# Patient Record
Sex: MALE | Race: BLACK OR AFRICAN AMERICAN | NOT HISPANIC OR LATINO | URBAN - METROPOLITAN AREA
[De-identification: names, ages, dates, MRNs, and addresses within clinical notes are randomized per-mention and may not be internally consistent; named-entity substitution may affect disease eponyms.]

---

## 2024-04-10 ENCOUNTER — TELEPHONE (OUTPATIENT)
Dept: ORTHOPEDICS | Facility: CLINIC | Age: 49
End: 2024-04-10
Payer: OTHER GOVERNMENT

## 2024-04-10 DIAGNOSIS — M79.642 BILATERAL HAND PAIN: Primary | ICD-10-CM

## 2024-04-10 DIAGNOSIS — M79.641 BILATERAL HAND PAIN: Primary | ICD-10-CM

## 2024-04-10 NOTE — TELEPHONE ENCOUNTER
I called the patient and left a voicemail reminding them of their appointment on 04/16/2024 with Dr. Lucas. I told them they will have XR done at 3:15 pm after their appointment. I informed them where to go and to arrive 15 minutes before their appointment time.

## 2024-04-10 NOTE — TELEPHONE ENCOUNTER
LVM c pt. Attempting to confirm appt location & time c Dr. Rosa   with XR. Requested a call back to the [facility] at [phone] with any questions, concerns or need for a different appointment time.

## 2024-04-16 ENCOUNTER — OFFICE VISIT (OUTPATIENT)
Dept: ORTHOPEDICS | Facility: CLINIC | Age: 49
End: 2024-04-16
Payer: OTHER GOVERNMENT

## 2024-04-16 ENCOUNTER — HOSPITAL ENCOUNTER (OUTPATIENT)
Dept: RADIOLOGY | Facility: OTHER | Age: 49
Discharge: HOME | End: 2024-04-16
Attending: ORTHOPAEDIC SURGERY
Payer: OTHER GOVERNMENT

## 2024-04-16 DIAGNOSIS — M79.641 BILATERAL HAND PAIN: Primary | ICD-10-CM

## 2024-04-16 DIAGNOSIS — M79.642 BILATERAL HAND PAIN: Primary | ICD-10-CM

## 2024-04-16 DIAGNOSIS — M79.641 BILATERAL HAND PAIN: ICD-10-CM

## 2024-04-16 DIAGNOSIS — M79.642 BILATERAL HAND PAIN: ICD-10-CM

## 2024-04-16 PROCEDURE — 99213 OFFICE O/P EST LOW 20 MIN: CPT | Mod: PBBFAC,25 | Performed by: ORTHOPAEDIC SURGERY

## 2024-04-16 PROCEDURE — 73130 X-RAY EXAM OF HAND: CPT | Mod: TC,50,FY

## 2024-04-16 PROCEDURE — 73130 X-RAY EXAM OF HAND: CPT | Mod: 26,50,, | Performed by: RADIOLOGY

## 2024-04-16 PROCEDURE — 99205 OFFICE O/P NEW HI 60 MIN: CPT | Mod: S$PBB,,, | Performed by: ORTHOPAEDIC SURGERY

## 2024-04-16 PROCEDURE — 99999 PR PBB SHADOW E&M-EST. PATIENT-LVL III: CPT | Mod: PBBFAC,,, | Performed by: ORTHOPAEDIC SURGERY

## 2024-04-19 NOTE — PROGRESS NOTES
I have personally taken the history and examined this patient. I agree with the resident's note as stated above.     Plan:  - Had an extensive discussion with the patient regarding diagnoses, reviewing images, and discussing conservative versus nonconservative options.  Overall I think the patient would benefit from surgical intervention to improve his function, but given the complexity of his deformities we would like to obtain more advanced imaging to further evaluate this.  He was in agreement with this plan.  Bilateral hand CTs ordered.  Follow up after CT to discuss imaging and possible surgical intervention.     - I spent more than 30 minutes reviewing this patient's medical records, imaging studies, and taking a full history and physical, and discussing his treatment plan and expected prognosis.  More than 50% of this time was spent face to face with the patient.  30 minutes of face to face consultation and 30 minutes of chart review and coordination of care.  This is a very complex case we spent a significant amount of time with the patient discussing options we do need to make sure that he is under great care from a rheumatologist since [facility] I do not have access to those records I would like access to his Rheumatology record

## 2024-04-22 ENCOUNTER — HOSPITAL ENCOUNTER (OUTPATIENT)
Dept: RADIOLOGY | Facility: HOSPITAL | Age: 49
Discharge: HOME | End: 2024-04-22
Attending: STUDENT IN AN ORGANIZED HEALTH CARE EDUCATION/TRAINING PROGRAM
Payer: OTHER GOVERNMENT

## 2024-04-22 DIAGNOSIS — M79.641 BILATERAL HAND PAIN: ICD-10-CM

## 2024-04-22 DIAGNOSIS — M79.642 BILATERAL HAND PAIN: ICD-10-CM

## 2024-04-22 PROCEDURE — 73200 CT UPPER EXTREMITY W/O DYE: CPT | Mod: TC,LT

## 2024-04-22 PROCEDURE — 73200 CT UPPER EXTREMITY W/O DYE: CPT | Mod: 26,LT,, | Performed by: RADIOLOGY

## 2024-04-22 PROCEDURE — 73200 CT UPPER EXTREMITY W/O DYE: CPT | Mod: 26,RT,, | Performed by: RADIOLOGY

## 2024-04-22 PROCEDURE — 73200 CT UPPER EXTREMITY W/O DYE: CPT | Mod: TC,RT

## 2024-04-30 ENCOUNTER — OFFICE VISIT (OUTPATIENT)
Dept: ORTHOPEDICS | Facility: CLINIC | Age: 49
End: 2024-04-30
Payer: OTHER GOVERNMENT

## 2024-04-30 VITALS — DIASTOLIC BLOOD PRESSURE: 78 MMHG | SYSTOLIC BLOOD PRESSURE: 111 MMHG

## 2024-04-30 DIAGNOSIS — M79.642 BILATERAL HAND PAIN: Primary | ICD-10-CM

## 2024-04-30 DIAGNOSIS — M79.641 BILATERAL HAND PAIN: Primary | ICD-10-CM

## 2024-04-30 PROCEDURE — 99214 OFFICE O/P EST MOD 30 MIN: CPT | Mod: S$PBB,,, | Performed by: ORTHOPAEDIC SURGERY

## 2024-04-30 PROCEDURE — 99999 PR PBB SHADOW E&M-EST. PATIENT-LVL II: CPT | Mod: PBBFAC,,, | Performed by: ORTHOPAEDIC SURGERY

## 2024-04-30 PROCEDURE — 99212 OFFICE O/P EST SF 10 MIN: CPT | Mod: PBBFAC | Performed by: ORTHOPAEDIC SURGERY

## 2024-04-30 NOTE — H&P
H&P  Orthopaedics    SUBJECTIVE:   Chief Complaint:  Bilateral hand contractures    History of Present Illness:  Patient is a 48 y.o. male who presents with 2 year history of worsening bilateral hand contractures.  The patient endorses he was diagnosed with polymyositis 3 years ago that is caused him debility with his hands with worsening wrist and digit hand contractures.  Endorses his left is worse than his right.  Denies pain or decreased sensation.  Does have the ability to do basic tasks such as grab things like a pizza and type with the assistance of a device.  Does have difficulty with hygiene due to his contractures and affects his ADLs.  Primarily wheelchair-bound and sometimes walks with his walker.  Also has chronic cough for which he [facility] a pulmon[facility]. He is on Cellcept for his polymyositis and endorses that his chronic cough has improved after starting a decreased dose, but his hand deformities have not.  Denies any lacking range of motion or strength of his bilateral shoulders or elbows.  Was seen by Dr. Perez initially for this issue and was previously scheduled for surgery, but due to approval, was not obtained.  Patient presents today for 2nd opinion.  Lives with his wife who is his primary caregiver.  Presents in wheelchair.     No hx of therapy, HEP, bracing, CSI, or Sx to [facility]. They are RHD.  [employer] .    Int hx 4/30/24: Patient returns today for B hand deformities. Last visit obtained CT scan for preoperative planning. Denies any changes to his symptoms. Ready to discuss surgical intervention for his right hand.    Scheduled Meds:      Continuous Infusions:      PRN Meds:        Review of patient's allergies indicates:  No Known Allergies    No past medical history on file.  No past surgical history on file.  No family history on file.        Review of Systems:  Patient denies constitutional symptoms, cardiac symptoms, respiratory symptoms, GI  symptoms.  The remainder of the musculoskeletal ROS is included in the HPI.      OBJECTIVE:     Physical Exam:  Gen:  No acute distress  CV:  Peripherally well-perfused.  Pulses 2+ bilaterally.  Lungs:  Normal respiratory effort.  Abdomen:  Soft, non-tender, non-distended  Head/Neck:  Normocephalic.  Atraumatic. No TTP, AROM and PROM intact without pain  Neuro:  CN intact without deficit, SILT throughout B/L Upper & Lower Extremities     Bilateral Hand/Wrist Examination:     No open wounds, abrasions, or surgical scars.  Nontender to palpation throughout.  Minimal range of motion of the wrist active or passively, but stuck in neutral position.  Ulnar drift of 2nd through 5th digits with subluxation of the extensor tendons.  MCPs contracted to 90° without contractures of the IP joints.  Left index finger PIPJ joint contracted at 90°.  He has no active extension to the 2nd through 5th digits bilaterally.    Right 3rd through 5th digit swan-neck deformities and right thumb boutonniere deformity.  Right index finger PIPJ joint contracture at 30°.  Has active extension of the thumb bilaterally.  Can make a complete fist.  Sensation intact to light touch throughout.  2+ radial pulse.    Stuck in pronation and can get to neutral. Otherwise ROM elbow full, painless     Abdomen not guarded  Respirations nonlabored  Perfusion intact                      Diagnostic Results:    Imaging - I independently viewed the patient's imaging as well as the radiology report.  Xrays of the patient's Bilateral hands demonstrate no evidence of any acute fractures.  Right 2nd through 5th MCP joints dislocated volarly stuck at 90° with ulnar deviation..  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    CT left and right hands obtained on 04/22/24 was reviewed by me and shows bilateral negative ulnar variance with radiocarpal osteoarthritis and associated intercarpal fusion.  MCP and PIPJ joint space narrowing with near congruent  joints on sagittal with flexion deformities, with ulnar deviation of the MCP joints on coronal.      ASSESSMENT/PLAN:     A/P: Jethro Hsu III is a 48 y.o. 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  Endorses his right hand is more symptomatic.      Plan:  - Had an extensive discussion with the patient regarding diagnoses, reviewing images, and discussing conservative versus nonconservative options.  Overall I think the patient would benefit from surgical intervention to improve his function which he was in agreement with.  It was not have any wound ulcers over the dorsal aspect of the PIPJ joints, indicating based off the literature that he be able to undergo soft tissue balancing with the positive outcome, however did educate the patient that upon evaluation in the operating room, that we would like to reserve the right to proceed with arthrodesis or arthroplasty if in the event he needs it based off of how much arthritis he has intraop.  Lastly, that we would not proceed with surgery on his left hand until his right has fully recovered.  - At this point recommend:   1) Right 2nd through 5th MCP joint soft tissue balancing with capsulotomies, possible MCP joint arthroplasties with silastic implants  2) Right 3rd through 5th swan-neck deformity corrections  3) Right index finger PIPJ joint volar plate release and capsulotomy with possible arthrodesis  4) Right thumb boutonniere deformity correction  - We did discuss due to his medical comorbidities as well as history of immunosuppressive medications, that he has an increased risk of wound healing complications  - After discussing the surgical options, risks, complications, and perioperative expectations, he wishes to proceed with surgery after endorsing understanding of the above.  - Informed and written consent was obtained in  clinic today      Wally Phillip MD  Orthopaedic Surgery Resident

## 2024-04-30 NOTE — PROGRESS NOTES
H&P  Orthopaedics    SUBJECTIVE:   Chief Complaint:  Bilateral hand contractures    History of Present Illness:  Patient is a 48 y.o. male who presents with 2 year history of worsening bilateral hand contractures.  The patient endorses he was diagnosed with polymyositis 3 years ago that is caused him debility with his hands with worsening wrist and digit hand contractures.  Endorses his left is worse than his right.  Denies pain or decreased sensation.  Does have the ability to do basic tasks such as grab things like a pizza and type with the assistance of a device.  Does have difficulty with hygiene due to his contractures and affects his ADLs.  Primarily wheelchair-bound and sometimes walks with his walker.  Also has chronic cough for which he sees a pulmonologist [facility]. He is on Cellcept for his polymyositis and endorses that his chronic cough has improved after starting a decreased dose, but his hand deformities have not.  Denies any lacking range of motion or strength of his bilateral shoulders or elbows.  Was seen by Dr. Perez initially for this issue and was previously scheduled for surgery, but due to approval, was not obtained.  Patient presents today for 2nd opinion.  Lives with his wife who is his primary caregiver.  Presents in wheelchair.     No hx of therapy, HEP, bracing, CSI, or Sx to [facility]. They are RHD.  [employer] .    Int hx 4/30/24: Patient returns today for B hand deformities. Last visit obtained CT scan for preoperative planning. Denies any changes to his symptoms. Ready to discuss surgical intervention for his right hand.    Scheduled Meds:      Continuous Infusions:      PRN Meds:        Review of patient's allergies indicates:  No Known Allergies    No past medical history on file.  No past surgical history on file.  No family history on file.        Review of Systems:  Patient denies constitutional symptoms, cardiac symptoms, respiratory symptoms, GI  symptoms.  The remainder of the musculoskeletal ROS is included in the HPI.      OBJECTIVE:     Physical Exam:  Gen:  No acute distress  CV:  Peripherally well-perfused.  Pulses 2+ bilaterally.  Lungs:  Normal respiratory effort.  Abdomen:  Soft, non-tender, non-distended  Head/Neck:  Normocephalic.  Atraumatic. No TTP, AROM and PROM intact without pain  Neuro:  CN intact without deficit, SILT throughout B/L Upper & Lower Extremities     Bilateral Hand/Wrist Examination:     No open wounds, abrasions, or surgical scars.  Nontender to palpation throughout.  Minimal range of motion of the wrist active or passively, but stuck in neutral position.  Ulnar drift of 2nd through 5th digits with subluxation of the extensor tendons.  MCPs contracted to 90° without contractures of the IP joints.  Left index finger PIPJ joint contracted at 90°.  He has no active extension to the 2nd through 5th digits bilaterally.    Right 3rd through 5th digit swan-neck deformities and right thumb boutonniere deformity.  Right index finger PIPJ joint contracture at 30°.  Has active extension of the thumb bilaterally.  Can make a complete fist.  Sensation intact to light touch throughout.  2+ radial pulse.    Stuck in pronation and can get to neutral. Otherwise ROM elbow full, painless     Abdomen not guarded  Respirations nonlabored  Perfusion intact                      Diagnostic Results:    Imaging - I independently viewed the patient's imaging as well as the radiology report.  Xrays of the patient's Bilateral hands demonstrate no evidence of any acute fractures.  Right 2nd through 5th MCP joints dislocated volarly stuck at 90° with ulnar deviation..  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    CT left and right hands obtained on 04/22/24 was reviewed by me and shows bilateral negative ulnar variance with radiocarpal osteoarthritis and associated intercarpal fusion.  MCP and PIPJ joint space narrowing with near congruent  joints on sagittal with flexion deformities, with ulnar deviation of the MCP joints on coronal.      ASSESSMENT/PLAN:     A/P: Jethro Hsu III is a 48 y.o. 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  Endorses his right hand is more symptomatic.      Plan:  - Had an extensive discussion with the patient regarding diagnoses, reviewing images, and discussing conservative versus nonconservative options.  Overall I think the patient would benefit from surgical intervention to improve his function which he was in agreement with.  It was not have any wound ulcers over the dorsal aspect of the PIPJ joints, indicating based off the literature that he be able to undergo soft tissue balancing with the positive outcome, however did educate the patient that upon evaluation in the operating room, that we would like to reserve the right to proceed with arthrodesis or arthroplasty if in the event he needs it based off of how much arthritis he has intraop.  Lastly, that we would not proceed with surgery on his left hand until his right has fully recovered.  - At this point recommend:   1) Right 2nd through 5th MCP joint soft tissue balancing with capsulotomies, possible MCP joint arthroplasties with silastic implants  2) Right 3rd through 5th swan-neck deformity corrections  3) Right index finger PIPJ joint volar plate release and capsulotomy with possible arthrodesis  4) Right thumb boutonniere deformity correction  - We did discuss due to his medical comorbidities as well as history of immunosuppressive medications, that he has an increased risk of wound healing complications  - After discussing the surgical options, risks, complications, and perioperative expectations, he wishes to proceed with surgery after endorsing understanding of the above.  - Informed and written consent was obtained in  clinic today      Wally Phillip MD  Orthopaedic Surgery Resident

## 2024-05-01 NOTE — PROGRESS NOTES
I have personally taken the history and examined this patient. I agree with the resident's note as stated above.   Plan:  - Had an extensive discussion with the patient regarding diagnoses, reviewing images, and discussing conservative versus nonconservative options.  Overall I think the patient would benefit from surgical intervention to improve his function which he was in agreement with.  It was not have any wound ulcers over the dorsal aspect of the PIPJ joints, indicating based off the literature that he be able to undergo soft tissue balancing with the positive outcome, however did educate the patient that upon evaluation in the operating room, that we would like to reserve the right to proceed with arthrodesis or arthroplasty if in the event he needs it based off of how much arthritis he has intraop.  Lastly, that we would not proceed with surgery on his left hand until his right has fully recovered.  - At this point recommend:   1) Right 2nd through 5th MCP joint soft tissue balancing with capsulotomies, possible MCP joint arthroplasties with silastic implants  2) Right 3rd through 5th swan-neck deformity corrections  3) Right index finger PIPJ joint volar plate release and capsulotomy with possible arthrodesis  4) Right thumb boutonniere deformity correction  - We did discuss due to his medical comorbidities as well as history of immunosuppressive medications, that he has an increased risk of wound healing complications  - After discussing the surgical options, risks, complications, and perioperative expectations, he wishes to proceed with surgery after endorsing understanding of the above.  - Informed and written consent was obtained in clinic today

## 2024-05-23 DIAGNOSIS — M79.642 BILATERAL HAND PAIN: Primary | ICD-10-CM

## 2024-05-23 DIAGNOSIS — M79.641 BILATERAL HAND PAIN: Primary | ICD-10-CM

## 2024-06-18 ENCOUNTER — ANESTHESIA EVENT (OUTPATIENT)
Dept: SURGERY | Facility: OTHER | Age: 49
End: 2024-06-18
Payer: OTHER GOVERNMENT

## 2024-06-18 NOTE — ANESTHESIA PREPROCEDURE EVALUATION
06/18/2024  Jethro Uriarte III is a 48 y.o., male.      Pre-op Assessment    I have reviewed the Patient Summary Reports.     I have reviewed the Nursing Notes. I have reviewed the NPO Status.   I have reviewed the Medications.     Review of Systems  Anesthesia Hx:    Hx Emergence Delirium           Denies Family Hx of Anesthesia complications.    Denies Personal Hx of Anesthesia complications.                    Hematology/Oncology:  Hematology Normal   Oncology Normal                                   EENT/Dental:  chronic allergic rhinitis           Cardiovascular:  Cardiovascular Normal                   ECHO 2020  EF 55%    Negative cath 2020                         Pulmonary:         Bronchiectasis               Renal/:  Renal/ Normal                 Hepatic/GI:  Hepatic/GI Normal                 Musculoskeletal:     Polymyositis  Systemic sclerosis            Neurological:    Denies CVA.    Seizures    Wheelchair bound    Medication induced seizure - 4 years ago (no longer taking)                            Endocrine:     Chronic steroids (been off for several years)        Psych:  Psychiatric Normal                  Physical Exam  General: Well nourished and Cooperative    Airway:  Mallampati: IV   Mouth Opening: < 3 cm  TM Distance: Normal  Neck ROM: Extension Decreased    Dental:  Periodontal disease        Anesthesia Plan  Type of Anesthesia, risks & benefits discussed:    Anesthesia Type: Regional  Intra-op Monitoring Plan: Standard ASA Monitors  Post Op Pain Control Plan: multimodal analgesia  Induction:  IV  Airway Plan: Video  Informed Consent: Informed consent signed with the Patient and all parties understand the risks and agree with anesthesia plan.  All questions answered.   ASA Score: 3  Day of Surgery Review of History & Physical: H&P Update referred to the surgeon/provider.  Anesthesia  Plan Notes: [facility]  Cr - 0.6  Na 132  K 3.3  Normal H/H 12/2023    Will discuss with surgery if amenable to regional block  Difficult airway - very limited mouth opening    Ready For Surgery From Anesthesia Perspective.     .

## 2024-06-21 ENCOUNTER — HOSPITAL ENCOUNTER (OUTPATIENT)
Dept: PREADMISSION TESTING | Facility: OTHER | Age: 49
Discharge: HOME | End: 2024-06-21
Attending: ORTHOPAEDIC SURGERY
Payer: OTHER GOVERNMENT

## 2024-06-21 VITALS
HEART RATE: 101 BPM | SYSTOLIC BLOOD PRESSURE: 107 MMHG | HEIGHT: 68 IN | OXYGEN SATURATION: 95 % | BODY MASS INDEX: 18.19 KG/M2 | WEIGHT: 120 LBS | DIASTOLIC BLOOD PRESSURE: 74 MMHG | TEMPERATURE: 98 F | RESPIRATION RATE: 16 BRPM

## 2024-06-21 RX ORDER — MIRTAZAPINE 7.5 MG/1
7.5 TABLET, FILM COATED ORAL NIGHTLY
COMMUNITY

## 2024-06-21 RX ORDER — SODIUM CHLORIDE, SODIUM LACTATE, POTASSIUM CHLORIDE, CALCIUM CHLORIDE 600; 310; 30; 20 MG/100ML; MG/100ML; MG/100ML; MG/100ML
INJECTION, SOLUTION INTRAVENOUS CONTINUOUS
Status: CANCELLED | OUTPATIENT
Start: 2024-06-21

## 2024-06-21 RX ORDER — ACETAMINOPHEN 500 MG
1000 TABLET ORAL
Status: CANCELLED | OUTPATIENT
Start: 2024-06-21 | End: 2024-06-21

## 2024-06-21 RX ORDER — ALBUTEROL SULFATE 0.83 MG/ML
2.5 SOLUTION RESPIRATORY (INHALATION) EVERY 6 HOURS PRN
COMMUNITY

## 2024-06-21 NOTE — DISCHARGE INSTRUCTIONS
Information to Prepare you for your Surgery    PRE-ADMIT TESTING   [address] [facility]       Your surgery has been scheduled at [facility]. We are pleased to have the opportunity to serve you. For Further Information please call [phone].    On the day of surgery please report to the Information Desk on the 1st floor.    CONTACT YOUR PHYSICIAN'S OFFICE THE DAY PRIOR TO YOUR SURGERY TO OBTAIN YOUR ARRIVAL TIME.     The evening before surgery do not eat anything after 9 p.m. ( this includes hard candy, chewing gum and mints).  You may only have GATORADE, POWERADE AND WATER  from 9 p.m. until you leave your home.   DO NOT DRINK ANY LIQUIDS ON THE WAY TO THE HOSPITAL.      Why does your anesthesiologist allow you to drink Gatorade/Powerade before surgery?  Gatorade/Powerade helps to increase your comfort before surgery and to decrease your nausea after surgery.   The carbohydrates in Gatorade/Powerade help reduce your body's stress response to surgery.  If you are a diabetic-drink only water prior to surgery.    Outpatient Surgery- May allow 2 adults (18 and older)/ Support Persons (1 being the designated ) for all surgical/procedural patients. A breastfeeding mother will be allowed her infant and 2 adult Support Persons. No one under the age of 18 will be allowed in the building.      SPECIAL MEDICATION INSTRUCTIONS: TAKE medications checked off by the Anesthesiologist on your Medication List.    Surgery Patients:  If you take ASPIRIN - Your PHYSICIAN/SURGEON will need to inform you IF/OR when you need to stop taking aspirin prior to your surgery.     Starting the week prior to surgery, do not take any medications containing IBUPROFEN or NSAIDS (Advil, Aleve, BC, Goody's, Ketorolac, Meloxicam, Mobic, Motrin, Naproxen, Toradol, etc).  If you are not sure if you should take a medicine please call your surgeon's office.  You may take Tylenol.    Do Not Wear any make-up  (especially eye make-up) to surgery. Please remove any false eyelashes or eyelash extensions. If you arrive the day of surgery with makeup/eyelashes on you will be required to remove prior to surgery. (There is a risk of corneal abrasions if eye makeup/eyelash extensions are not removed)    Leave all valuables at home.   Do Not wear any jewelry or watches, including any metal in body piercings. Jewelry must be removed prior to coming to the hospital.  There is a possibility that rings that are unable to be removed may be cut off if they are on the surgical extremity.    Please remove all hair extensions, wigs, clips and any other metal accessories/ ornaments from your hair.  These items may pose a flammable/fire risk in Surgery and must be removed.    Do not shave your surgical area at least 5 days prior to your surgery. The surgical prep will be performed at the hospital according to Infection Control regulations.    Contact Lens must be removed before surgery. Either do not wear the contact lens or bring a case and solution for storage.  Please bring a container for eyeglasses or dentures as required.  Bring any paperwork your physician has provided, such as consent forms,  history and physicals, doctor's orders, etc.   Bring comfortable clothes that are loose fitting to wear upon discharge. Take into consideration the type of surgery being performed.  Maintain your diet as advised per your physician the day prior to surgery.    Adequate rest the night before surgery is advised.   Park in the Parking lot behind the hospital or in the [facility] across the street from the parking lot. Parking is complimentary.  If you will be discharged the same day as your procedure, please arrange for a responsible adult to drive you home or to accompany you if traveling by taxi.   YOU WILL NOT BE PERMITTED TO DRIVE OR TO LEAVE THE HOSPITAL ALONE AFTER SURGERY.   If you are being discharged the same day, it is  strongly recommended that you arrange for someone to remain with you for the first 24 hrs following your surgery.    The Surgeon will speak to your family/visitor after your surgery regarding the outcome of your surgery and post op care.  The Surgeon may speak to you after your surgery, but there is a possibility you may not remember the details.  Please check with your family members regarding the conversation with the Surgeon.    We strongly recommend whoever is bringing you home be present for discharge instructions.  This will ensure a thorough understanding for your post op home care.              Bathing Instructions with Hibiclens  Shower the evening before and morning of your procedure with Chlorhexidine (Hibiclens)    Do not use Chlorhexidine on your face or genitals. Do not get in your eyes.  Wash your face with water and your regular face wash/soap  Use your regular shampoo  Apply Chlorhexidine (Hibiclens) directly on your skin or on a wet washcloth and wash gently. When showering: Move away from the shower stream when applying Chlorhexidine (Hibiclens) to avoid rinsing off too soon.  Rinse thoroughly with warm water  Do not dilute Chlorhexidine (Hibiclens)   Dry off as usual, do not use any deodorant, powder, body lotions, perfume, after shave or cologne.

## 2024-06-25 ENCOUNTER — TELEPHONE (OUTPATIENT)
Dept: ORTHOPEDICS | Facility: CLINIC | Age: 49
End: 2024-06-25
Payer: OTHER GOVERNMENT

## 2024-06-25 RX ORDER — OXYCODONE AND ACETAMINOPHEN 5; 325 MG/1; MG/1
1 TABLET ORAL
Qty: 10 TABLET | Refills: 0 | Status: SHIPPED | OUTPATIENT
Start: 2024-06-25 | End: 2024-06-28 | Stop reason: SDUPTHER

## 2024-06-25 RX ORDER — ACETAMINOPHEN 500 MG
1000 TABLET ORAL 2 TIMES DAILY PRN
Qty: 50 TABLET | Refills: 0 | Status: SHIPPED | OUTPATIENT
Start: 2024-06-25

## 2024-06-25 RX ORDER — IBUPROFEN 600 MG/1
600 TABLET ORAL 3 TIMES DAILY PRN
Qty: 45 TABLET | Refills: 0 | Status: SHIPPED | OUTPATIENT
Start: 2024-06-25

## 2024-06-25 NOTE — TELEPHONE ENCOUNTER
Spoke c pt. Informed pt of 7:30 arrival time for 05/18/22 surgery at the [facility]. Reminded pt of NPO status. Pt expressed understanding & was thankful.

## 2024-06-26 ENCOUNTER — HOSPITAL ENCOUNTER (OUTPATIENT)
Facility: OTHER | Age: 49
Discharge: HOME | End: 2024-06-26
Attending: ORTHOPAEDIC SURGERY | Admitting: ORTHOPAEDIC SURGERY
Payer: OTHER GOVERNMENT

## 2024-06-26 ENCOUNTER — ANESTHESIA (OUTPATIENT)
Dept: SURGERY | Facility: OTHER | Age: 49
End: 2024-06-26
Payer: OTHER GOVERNMENT

## 2024-06-26 DIAGNOSIS — M24.541 CONTRACTURE OF RIGHT HAND: Primary | ICD-10-CM

## 2024-06-26 PROCEDURE — 63600175 PHARM REV CODE 636 W HCPCS: Performed by: ANESTHESIOLOGY

## 2024-06-26 PROCEDURE — 36000706: Performed by: ORTHOPAEDIC SURGERY

## 2024-06-26 PROCEDURE — C1713 ANCHOR/SCREW BN/BN,TIS/BN: HCPCS | Performed by: ORTHOPAEDIC SURGERY

## 2024-06-26 PROCEDURE — 37000009 HC ANESTHESIA EA ADD 15 MINS: Performed by: ORTHOPAEDIC SURGERY

## 2024-06-26 PROCEDURE — L8630 METACARPOPHALANGEAL IMPLANT: HCPCS | Performed by: ORTHOPAEDIC SURGERY

## 2024-06-26 PROCEDURE — 26860 FUSION OF FINGER JOINT: CPT | Mod: 51,F6,, | Performed by: ORTHOPAEDIC SURGERY

## 2024-06-26 PROCEDURE — 25000003 PHARM REV CODE 250

## 2024-06-26 PROCEDURE — 63600175 PHARM REV CODE 636 W HCPCS

## 2024-06-26 PROCEDURE — 26531 REVISE KNUCKLE WITH IMPLANT: CPT | Mod: F6,F7,F8, | Performed by: ORTHOPAEDIC SURGERY

## 2024-06-26 PROCEDURE — C1769 GUIDE WIRE: HCPCS | Performed by: ORTHOPAEDIC SURGERY

## 2024-06-26 PROCEDURE — 71000015 HC POSTOP RECOV 1ST HR: Performed by: ORTHOPAEDIC SURGERY

## 2024-06-26 PROCEDURE — 26861 FUSION OF FINGER JNT ADD-ON: CPT | Mod: 51,F7,F8, | Performed by: ORTHOPAEDIC SURGERY

## 2024-06-26 PROCEDURE — 25000003 PHARM REV CODE 250: Performed by: ANESTHESIOLOGY

## 2024-06-26 PROCEDURE — 76942 ECHO GUIDE FOR BIOPSY: CPT | Performed by: ANESTHESIOLOGY

## 2024-06-26 PROCEDURE — 37000008 HC ANESTHESIA 1ST 15 MINUTES: Performed by: ORTHOPAEDIC SURGERY

## 2024-06-26 PROCEDURE — 26426 REPAIR FINGER/HAND TENDON: CPT | Mod: 51,F5,, | Performed by: ORTHOPAEDIC SURGERY

## 2024-06-26 PROCEDURE — 25000003 PHARM REV CODE 250: Performed by: NURSE ANESTHETIST, CERTIFIED REGISTERED

## 2024-06-26 PROCEDURE — 71000016 HC POSTOP RECOV ADDL HR: Performed by: ORTHOPAEDIC SURGERY

## 2024-06-26 PROCEDURE — 27201423 OPTIME MED/SURG SUP & DEVICES STERILE SUPPLY: Performed by: ORTHOPAEDIC SURGERY

## 2024-06-26 PROCEDURE — 36000707: Performed by: ORTHOPAEDIC SURGERY

## 2024-06-26 PROCEDURE — 63600175 PHARM REV CODE 636 W HCPCS: Performed by: NURSE ANESTHETIST, CERTIFIED REGISTERED

## 2024-06-26 DEVICE — IMPLANTABLE DEVICE: Type: IMPLANTABLE DEVICE | Site: HAND | Status: FUNCTIONAL

## 2024-06-26 DEVICE — IMPLANTABLE DEVICE: Type: IMPLANTABLE DEVICE | Site: FINGER | Status: FUNCTIONAL

## 2024-06-26 RX ORDER — KETAMINE HYDROCHLORIDE 50 MG/ML
INJECTION, SOLUTION INTRAMUSCULAR; INTRAVENOUS
Status: DISCONTINUED | OUTPATIENT
Start: 2024-06-26 | End: 2024-06-26

## 2024-06-26 RX ORDER — ROPIVACAINE HYDROCHLORIDE 5 MG/ML
INJECTION, SOLUTION EPIDURAL; INFILTRATION; PERINEURAL
Status: COMPLETED | OUTPATIENT
Start: 2024-06-26 | End: 2024-06-26

## 2024-06-26 RX ORDER — SODIUM CHLORIDE, SODIUM LACTATE, POTASSIUM CHLORIDE, CALCIUM CHLORIDE 600; 310; 30; 20 MG/100ML; MG/100ML; MG/100ML; MG/100ML
INJECTION, SOLUTION INTRAVENOUS CONTINUOUS
Status: DISCONTINUED | OUTPATIENT
Start: 2024-06-26 | End: 2024-06-26 | Stop reason: HOSPADM

## 2024-06-26 RX ORDER — OXYCODONE HYDROCHLORIDE 5 MG/1
5 TABLET ORAL
Status: CANCELLED | OUTPATIENT
Start: 2024-06-26

## 2024-06-26 RX ORDER — SODIUM CHLORIDE 0.9 % (FLUSH) 0.9 %
3 SYRINGE (ML) INJECTION
Status: CANCELLED | OUTPATIENT
Start: 2024-06-26

## 2024-06-26 RX ORDER — PHENYLEPHRINE HYDROCHLORIDE 10 MG/ML
INJECTION INTRAVENOUS
Status: DISCONTINUED | OUTPATIENT
Start: 2024-06-26 | End: 2024-06-26

## 2024-06-26 RX ORDER — HYDROMORPHONE HYDROCHLORIDE 2 MG/ML
0.4 INJECTION, SOLUTION INTRAMUSCULAR; INTRAVENOUS; SUBCUTANEOUS EVERY 5 MIN PRN
Status: CANCELLED | OUTPATIENT
Start: 2024-06-26

## 2024-06-26 RX ORDER — FENTANYL CITRATE 50 UG/ML
INJECTION, SOLUTION INTRAMUSCULAR; INTRAVENOUS
Status: DISCONTINUED | OUTPATIENT
Start: 2024-06-26 | End: 2024-06-26

## 2024-06-26 RX ORDER — ACETAMINOPHEN 500 MG
1000 TABLET ORAL
Status: COMPLETED | OUTPATIENT
Start: 2024-06-26 | End: 2024-06-26

## 2024-06-26 RX ORDER — PROPOFOL 10 MG/ML
VIAL (ML) INTRAVENOUS
Status: DISCONTINUED | OUTPATIENT
Start: 2024-06-26 | End: 2024-06-26

## 2024-06-26 RX ORDER — DIPHENHYDRAMINE HYDROCHLORIDE 50 MG/ML
25 INJECTION INTRAMUSCULAR; INTRAVENOUS EVERY 6 HOURS PRN
Status: CANCELLED | OUTPATIENT
Start: 2024-06-26

## 2024-06-26 RX ORDER — LIDOCAINE HYDROCHLORIDE 20 MG/ML
INJECTION INTRAVENOUS
Status: DISCONTINUED | OUTPATIENT
Start: 2024-06-26 | End: 2024-06-26

## 2024-06-26 RX ORDER — MIDAZOLAM HYDROCHLORIDE 1 MG/ML
INJECTION INTRAMUSCULAR; INTRAVENOUS
Status: DISCONTINUED | OUTPATIENT
Start: 2024-06-26 | End: 2024-06-26

## 2024-06-26 RX ORDER — PROCHLORPERAZINE EDISYLATE 5 MG/ML
5 INJECTION INTRAMUSCULAR; INTRAVENOUS EVERY 30 MIN PRN
Status: CANCELLED | OUTPATIENT
Start: 2024-06-26

## 2024-06-26 RX ORDER — PROPOFOL 10 MG/ML
VIAL (ML) INTRAVENOUS CONTINUOUS PRN
Status: DISCONTINUED | OUTPATIENT
Start: 2024-06-26 | End: 2024-06-26

## 2024-06-26 RX ORDER — MEPERIDINE HYDROCHLORIDE 25 MG/ML
12.5 INJECTION INTRAMUSCULAR; INTRAVENOUS; SUBCUTANEOUS ONCE AS NEEDED
Status: CANCELLED | OUTPATIENT
Start: 2024-06-26 | End: 2024-06-27

## 2024-06-26 RX ORDER — ONDANSETRON HYDROCHLORIDE 2 MG/ML
INJECTION, SOLUTION INTRAVENOUS
Status: DISCONTINUED | OUTPATIENT
Start: 2024-06-26 | End: 2024-06-26

## 2024-06-26 RX ADMIN — PROPOFOL 30 MG: 10 INJECTION, EMULSION INTRAVENOUS at 01:06

## 2024-06-26 RX ADMIN — MIDAZOLAM HYDROCHLORIDE 1 MG: 1 INJECTION INTRAMUSCULAR; INTRAVENOUS at 02:06

## 2024-06-26 RX ADMIN — CEFAZOLIN 2 G: 2 INJECTION, POWDER, FOR SOLUTION INTRAMUSCULAR; INTRAVENOUS at 05:06

## 2024-06-26 RX ADMIN — SODIUM CHLORIDE, SODIUM LACTATE, POTASSIUM CHLORIDE, AND CALCIUM CHLORIDE: 600; 310; 30; 20 INJECTION, SOLUTION INTRAVENOUS at 03:06

## 2024-06-26 RX ADMIN — KETAMINE HYDROCHLORIDE 20 MG: 50 INJECTION INTRAMUSCULAR; INTRAVENOUS at 01:06

## 2024-06-26 RX ADMIN — CEFAZOLIN 2 G: 2 INJECTION, POWDER, FOR SOLUTION INTRAMUSCULAR; INTRAVENOUS at 01:06

## 2024-06-26 RX ADMIN — ACETAMINOPHEN 1000 MG: 500 TABLET ORAL at 12:06

## 2024-06-26 RX ADMIN — KETAMINE HYDROCHLORIDE 10 MG: 50 INJECTION INTRAMUSCULAR; INTRAVENOUS at 02:06

## 2024-06-26 RX ADMIN — PROPOFOL 75 MCG/KG/MIN: 10 INJECTION, EMULSION INTRAVENOUS at 01:06

## 2024-06-26 RX ADMIN — PHENYLEPHRINE HYDROCHLORIDE 100 MCG: 10 INJECTION INTRAVENOUS at 03:06

## 2024-06-26 RX ADMIN — PROPOFOL 20 MG: 10 INJECTION, EMULSION INTRAVENOUS at 02:06

## 2024-06-26 RX ADMIN — MIDAZOLAM HYDROCHLORIDE 1 MG: 1 INJECTION INTRAMUSCULAR; INTRAVENOUS at 01:06

## 2024-06-26 RX ADMIN — KETAMINE HYDROCHLORIDE 20 MG: 50 INJECTION INTRAMUSCULAR; INTRAVENOUS at 03:06

## 2024-06-26 RX ADMIN — LIDOCAINE HYDROCHLORIDE 60 MG: 20 INJECTION, SOLUTION INTRAVENOUS at 01:06

## 2024-06-26 RX ADMIN — PROPOFOL 30 MG: 10 INJECTION, EMULSION INTRAVENOUS at 03:06

## 2024-06-26 RX ADMIN — SODIUM CHLORIDE, SODIUM LACTATE, POTASSIUM CHLORIDE, AND CALCIUM CHLORIDE: 600; 310; 30; 20 INJECTION, SOLUTION INTRAVENOUS at 12:06

## 2024-06-26 RX ADMIN — GLYCOPYRROLATE 0.2 MG: 0.2 INJECTION, SOLUTION INTRAMUSCULAR; INTRAVITREAL at 02:06

## 2024-06-26 RX ADMIN — PHENYLEPHRINE HYDROCHLORIDE 0.2 MCG/KG/MIN: 10 INJECTION INTRAVENOUS at 03:06

## 2024-06-26 RX ADMIN — FENTANYL CITRATE 50 MCG: 50 INJECTION, SOLUTION INTRAMUSCULAR; INTRAVENOUS at 12:06

## 2024-06-26 RX ADMIN — ROPIVACAINE HYDROCHLORIDE 10 ML: 5 INJECTION, SOLUTION EPIDURAL; INFILTRATION; PERINEURAL at 01:06

## 2024-06-26 RX ADMIN — PHENYLEPHRINE HYDROCHLORIDE 100 MCG: 10 INJECTION INTRAVENOUS at 02:06

## 2024-06-26 RX ADMIN — MIDAZOLAM HYDROCHLORIDE 1 MG: 1 INJECTION INTRAMUSCULAR; INTRAVENOUS at 12:06

## 2024-06-26 RX ADMIN — ROPIVACAINE HYDROCHLORIDE 30 ML: 5 INJECTION, SOLUTION EPIDURAL; INFILTRATION; PERINEURAL at 12:06

## 2024-06-26 RX ADMIN — ONDANSETRON HYDROCHLORIDE 4 MG: 2 INJECTION INTRAMUSCULAR; INTRAVENOUS at 01:06

## 2024-06-26 NOTE — H&P
H&P  Orthopaedics     SUBJECTIVE:   Chief Complaint:  Bilateral hand contractures     History of Present Illness:  Patient is a 48 y.o. male who presents with 2 year history of worsening bilateral hand contractures.  The patient endorses he was diagnosed with polymyositis 3 years ago that is caused him debility with his hands with worsening wrist and digit hand contractures.  Endorses his left is worse than his right.  Denies pain or decreased sensation.  Does have the ability to do basic tasks such as grab things like a pizza and type with the assistance of a device.  Does have difficulty with hygiene due to his contractures and affects his ADLs.  Primarily wheelchair-bound and sometimes walks with his walker.  Also has chronic cough for which he [facility] a pulmon[facility] He is on Cellcept for his polymyositis and endorses that his chronic cough has improved after starting a decreased dose, but his hand deformities have not.  Denies any lacking range of motion or strength of his bilateral shoulders or elbows.  Was seen by Dr. Perez initially for this issue and was previously scheduled for surgery, but due to approval, was not obtained.  Patient presents today for 2nd opinion.  Lives with his wife who is his primary caregiver.  Presents in wheelchair.      No hx of therapy, HEP, bracing, CSI, or Sx to [facility]. They are RHD.  [employer] .     Int hx 4/30/24: Patient returns today for B hand deformities. Last visit obtained CT scan for preoperative planning. Denies any changes to his symptoms. Ready to discuss surgical intervention for his right hand.     Review of patient's allergies indicates:  No Known Allergies     No past medical history on file.  No past surgical history on file.  No family history on file.      Review of Systems:  Patient denies constitutional symptoms, cardiac symptoms, respiratory symptoms, GI symptoms.  The remainder of the musculoskeletal ROS is included in  the HPI.     OBJECTIVE:      Physical Exam:  Gen:  No acute distress  CV:  Peripherally well-perfused.  Pulses 2+ bilaterally.  Lungs:  Normal respiratory effort.  Abdomen:  Soft, non-tender, non-distended  Head/Neck:  Normocephalic.  Atraumatic. No TTP, AROM and PROM intact without pain  Neuro:  CN intact without deficit, SILT throughout B/L Upper & Lower Extremities     Bilateral Hand/Wrist Examination:     No open wounds, abrasions, or surgical scars.  Nontender to palpation throughout.  Minimal range of motion of the wrist active or passively, but stuck in neutral position.  Ulnar drift of 2nd through 5th digits with subluxation of the extensor tendons.  MCPs contracted to 90° without contractures of the IP joints.  Left index finger PIPJ joint contracted at 90°.  He has no active extension to the 2nd through 5th digits bilaterally.    Right 3rd through 5th digit swan-neck deformities and right thumb boutonniere deformity.  Right index finger PIPJ joint contracture at 30°.  Has active extension of the thumb bilaterally.  Can make a complete fist.  Sensation intact to light touch throughout.  2+ radial pulse.    Stuck in pronation and can get to neutral. Otherwise ROM elbow full, painless     Abdomen not guarded  Respirations nonlabored  Perfusion intact                            Diagnostic Results:     Imaging - I independently viewed the patient's imaging as well as the radiology report.  Xrays of the patient's Bilateral hands demonstrate no evidence of any acute fractures.  Right 2nd through 5th MCP joints dislocated volarly stuck at 90° with ulnar deviation..  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     CT left and right hands obtained on 04/22/24 was reviewed by me and shows bilateral negative ulnar variance with radiocarpal osteoarthritis and associated intercarpal fusion.  MCP and PIPJ joint space narrowing with near congruent joints on sagittal with flexion deformities, with ulnar  deviation of the MCP joints on coronal.        ASSESSMENT/PLAN:      A/P: Jethro Uriarte III is a 48 y.o. 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  Endorses his right hand is more symptomatic.        Plan:  - Had an extensive discussion with the patient regarding diagnoses, reviewing images, and discussing conservative versus nonconservative options.  Overall I think the patient would benefit from surgical intervention to improve his function which he was in agreement with.  It was not have any wound ulcers over the dorsal aspect of the PIPJ joints, indicating based off the literature that he be able to undergo soft tissue balancing with the positive outcome, however did educate the patient that upon evaluation in the operating room, that we would like to reserve the right to proceed with arthrodesis or arthroplasty if in the event he needs it based off of how much arthritis he has intraop.  Lastly, that we would not proceed with surgery on his left hand until his right has fully recovered.  - At this point recommend:   1) Right 2nd through 5th MCP joint soft tissue balancing with capsulotomies, possible MCP joint arthroplasties with silastic implants  2) Right 3rd through 5th swan-neck deformity corrections  3) Right index finger PIPJ joint volar plate release and capsulotomy with possible arthrodesis  4) Right thumb boutonniere deformity correction  - We did discuss due to his medical comorbidities as well as history of immunosuppressive medications, that he has an increased risk of wound healing complications  - After discussing the surgical options, risks, complications, and perioperative expectations, he wishes to proceed with surgery after endorsing understanding of the above.  - Informed and written consent was obtained in clinic today

## 2024-06-26 NOTE — ANESTHESIA PROCEDURE NOTES
Right costoclavicular    Patient location during procedure: holding area    Reason for block: primary anesthetic    Diagnosis: Right hand deformities   Timeout: 6/26/2024 12:12 PM   End time: 6/26/2024 12:19 PM    Staffing  Authorizing Provider: Aguila Potter MD  Performing Provider: Aguila Potter MD    Staffing  Other anesthesia staff: Aguila Potter MD  Performed by: Aguila Potter MD  Authorized by: Aguila Potter MD    Preanesthetic Checklist  Completed: patient identified, IV checked, site marked, risks and benefits discussed, surgical consent, monitors and equipment checked, pre-op evaluation and timeout performed  Peripheral Block  Patient position: supine  Prep: ChloraPrep  Patient monitoring: heart rate, cardiac monitor, continuous pulse ox and frequent blood pressure checks  Block type: infraclavicular  Laterality: right  Injection technique: single shot  Needle  Needle type: Echogenic   Needle gauge: 20 G  Needle length: 4 in  Needle localization: ultrasound guidance and anatomical landmarks   -ultrasound image captured on disc.  Assessment  Injection assessment: negative aspiration, negative parasthesia and local visualized surrounding nerve  Paresthesia pain: none  Heart rate change: no  Slow fractionated injection: yes    Medications:    Medications: ropivacaine (NAROPIN) injection 0.5% - Perineural   30 mL - 6/26/2024 12:19:00 PM    Additional Notes  Costoclavicular approach to infraclavicular block

## 2024-06-26 NOTE — ANESTHESIA POSTPROCEDURE EVALUATION
Anesthesia Post Evaluation    Patient: Jethro Ebbs III    Procedure(s) Performed: Procedure(s) (LRB):  RIGHT 2ND, 3RD, 4TH AND 5TH MCPJ SOFT TISSUE (WITH CAPSULOTOMIES) AND BALANCING WITH ARTHTOPLASTIES (Right)  INDEX, LONG, AND SMALL FINGER PIP VOLAR PLATE RELEAE AND FUSION WITH CAPSULOTOMY (Right)  BOUTIENIERE DEFORMITY OF THUMB CORRECTION (Right)  CLOSED REDUCTION, PIP JOINT, ARTICULAR, RING FINGER    Final Anesthesia Type: regional      Patient location during evaluation: [facility]  Patient participation: Yes- Able to Participate  Level of consciousness: awake and alert  Post-procedure vital signs: reviewed and stable  Pain management: adequate  Airway patency: patent    PONV status at discharge: No PONV  Anesthetic complications: no      Cardiovascular status: blood pressure returned to baseline and hemodynamically stable  Respiratory status: unassisted, spontaneous ventilation and room air  Hydration status: euvolemic  Follow-up not needed.              Vitals Value Taken Time   /64 06/26/24 0822   Temp 37.1 °C (98.8 °F) 06/26/24 0822   Pulse 96 06/26/24 0822   Resp 18 06/26/24 0822   SpO2 96 % 06/26/24 0822         No case tracking events are documented in the log.      Pain/Tu Score: Pain Rating Prior to Med Admin: 0 (6/26/2024 12:00 PM)

## 2024-06-26 NOTE — ANESTHESIA PROCEDURE NOTES
Right serratus plane    Patient location during procedure: holding area    Reason for block: primary anesthetic    Diagnosis: Right hand deformities   Timeout: 6/26/2024 1:23 PM   End time: 6/26/2024 1:25 PM    Staffing  Authorizing Provider: Aguila Potter MD  Performing Provider: Aguila Potter MD    Staffing  Performed by: Aguila Potter MD  Authorized by: Aguila Potter MD    Preanesthetic Checklist  Completed: patient identified, IV checked, site marked, risks and benefits discussed, surgical consent, monitors and equipment checked, pre-op evaluation and timeout performed  Peripheral Block  Patient position: supine  Prep: ChloraPrep and site prepped and draped  Patient monitoring: heart rate, cardiac monitor, continuous pulse ox and frequent blood pressure checks  Block type: Serratus Plane  Laterality: right  Injection technique: single shot  Needle  Needle type: Echogenic   Needle gauge: 20 G  Needle length: 3.5 in  Needle localization: ultrasound guidance   -ultrasound image captured on disc.  Assessment  Injection assessment: negative aspiration, local visualized surrounding nerve and negative parasthesia  Paresthesia pain: none  Heart rate change: no  Slow fractionated injection: yes  Pain Tolerance: comfortable throughout block and no complaints  Medications:    Medications: ropivacaine (NAROPIN) injection 0.5% - Perineural   10 mL - 6/26/2024 1:25:00 PM    Additional Notes  Local visualized to spread under lat.dorsi  Surgeon concerned about tourniquet pain-Serratus block as supplement to brachial plexus block

## 2024-06-26 NOTE — BRIEF OP NOTE
[facility] ([place])  Brief Operative Note    Surgery Date: 6/26/2024     Surgeons and Role:     * Rhoda Lucas MD - Primary    Assisting Surgeon: None    Pre-op Diagnosis:  Bilateral hand pain [M79.641, M79.642]    Post-op Diagnosis:  Post-Op Diagnosis Codes:     * Bilateral hand pain [M79.641, M79.642]    Procedure(s) (LRB):  RIGHT 2ND, 3RD, 4TH AND 5TH MCPJ SOFT TISSUE (WITH CAPSULOTOMIES) AND BALANCING WITH ARTHTOPLASTIES (Right)  INDEX, LONG, AND SMALL FINGER PIP VOLAR PLATE RELEAE AND FUSION WITH CAPSULOTOMY (Right)  BOUTIENIERE DEFORMITY OF THUMB CORRECTION (Right)  CLOSED REDUCTION, PIP JOINT, ARTICULAR, RING FINGER    Anesthesia: Regional    Operative Findings: Adequate correction after 1st webspace Z-plasty, 2-5th MCP arthroplasty, 2nd, 3rd, and 5th PIP fusions, and 4th PIP pinning.    Estimated Blood Loss: 20 mL         Specimens:   Specimen (24h ago, onward)      None              Discharge Note    OUTCOME: Patient tolerated treatment/procedure well without complication and is now ready for discharge.    DISPOSITION: Home or Self Care    FINAL DIAGNOSIS:  Contracture of right hand    FOLLOWUP: In clinic    DISCHARGE INSTRUCTIONS:    Discharge Procedure Orders   Diet general     Call MD for:  temperature >100.4     Call MD for:  persistent nausea and vomiting     Call MD for:  severe uncontrolled pain     Call MD for:  difficulty breathing, headache or visual disturbances     Call MD for:  redness, tenderness, or signs of infection (pain, swelling, redness, odor or green/yellow discharge around incision site)     Call MD for:  hives     Call MD for:  persistent dizziness or light-headedness     Call MD for:  extreme fatigue     Leave dressing on - Keep it clean, dry, and intact until clinic visit     Non weight bearing

## 2024-06-27 VITALS
HEART RATE: 78 BPM | TEMPERATURE: 98 F | DIASTOLIC BLOOD PRESSURE: 61 MMHG | SYSTOLIC BLOOD PRESSURE: 113 MMHG | OXYGEN SATURATION: 98 % | RESPIRATION RATE: 16 BRPM

## 2024-06-27 DIAGNOSIS — M24.549 CONTRACTURE OF JOINT OF FINGER, UNSPECIFIED LATERALITY: ICD-10-CM

## 2024-06-27 DIAGNOSIS — Z98.890 POST-OPERATIVE STATE: Primary | ICD-10-CM

## 2024-06-27 NOTE — PLAN OF CARE
Jethro Uriarte III has met all discharge criteria from Phase II. Vital Signs are stable, ambulating  without difficulty. Discharge instructions given, patient verbalized understanding. Discharged from facility via wheelchair in stable condition.

## 2024-06-28 ENCOUNTER — TELEPHONE (OUTPATIENT)
Dept: ORTHOPEDICS | Facility: CLINIC | Age: 49
End: 2024-06-28
Payer: OTHER GOVERNMENT

## 2024-06-28 ENCOUNTER — NURSE TRIAGE (OUTPATIENT)
Dept: ADMINISTRATIVE | Facility: CLINIC | Age: 49
End: 2024-06-28
Payer: OTHER GOVERNMENT

## 2024-06-28 ENCOUNTER — PATIENT MESSAGE (OUTPATIENT)
Dept: ORTHOPEDICS | Facility: CLINIC | Age: 49
End: 2024-06-28
Payer: OTHER GOVERNMENT

## 2024-06-28 DIAGNOSIS — Z98.890 POST-OPERATIVE STATE: Primary | ICD-10-CM

## 2024-06-28 DIAGNOSIS — M24.549 CONTRACTURE OF JOINT OF FINGER, UNSPECIFIED LATERALITY: ICD-10-CM

## 2024-06-28 RX ORDER — OXYCODONE AND ACETAMINOPHEN 5; 325 MG/1; MG/1
1 TABLET ORAL
Qty: 10 TABLET | Refills: 0 | Status: SHIPPED | OUTPATIENT
Start: 2024-06-28

## 2024-06-28 NOTE — TELEPHONE ENCOUNTER
Had hand surgery on yesterday and is having excruciating pain and feels like circulation is being cut off and was advised not to remove the wrap. On call ortho states that he does not cover for hand. No on call listed for hand. Pt advised to be seen in ED at this time.  Reason for Disposition   [1] SEVERE post-op pain (e.g., excruciating, pain scale 8-10) AND [2] not controlled with pain medications    Additional Information   Negative: Sounds like a life-threatening emergency to the triager   Negative: Chest pain   Negative: Difficulty breathing   Negative: Acting confused (e.g., disoriented, slurred speech) or excessively sleepy   Negative: Post-op tonsil and adenoid surgery, symptoms or questions about   Negative: Surgical incision symptoms and questions   Negative: [1] Pain or burning with passing urine (urination) AND [2] male   Negative: [1] Pain or burning with passing urine (urination) AND [2] female   Negative: Constipation   Negative: New or worsening leg (calf, thigh) pain   Negative: New or worsening leg swelling   Negative: Dizziness is severe, or persists > 24 hours after surgery   Negative: Pain, redness, swelling, or pus at IV site (current or recent)   Negative: Symptoms arising from use of a urinary catheter (e.g., Coude, Santana)   Negative: Cast problems or questions   Negative: Medication question   Negative: [1] Widespread rash AND [2] bright red, sunburn-like   Negative: [1] SEVERE headache AND [2] after spinal (epidural) anesthesia   Negative: [1] Vomiting AND [2] persists > 4 hours   Negative: [1] Vomiting AND [2] abdomen looks much more swollen than usual   Negative: [1] Drinking very little AND [2] dehydration suspected (e.g., no urine > 12 hours, very dry mouth, very lightheaded)   Negative: Patient sounds very sick or weak to the triager   Negative: Sounds like a serious complication to the triager   Negative: Fever > 100.4 F (38.0 C)    Protocols used: Post-Op Symptoms and  [id number]

## 2024-06-28 NOTE — TELEPHONE ENCOUNTER
Spoke to patient and he's pain is a 9/10 and has been taking 1 percocet along with his ibuprofen and tylenol. Per Dr Rosa he is to increase his dosage to 2 percocet and alternate his tylenol and ibuprofen.

## 2024-07-03 ENCOUNTER — CLINICAL SUPPORT (OUTPATIENT)
Dept: REHABILITATION | Facility: HOSPITAL | Age: 49
End: 2024-07-03
Payer: OTHER GOVERNMENT

## 2024-07-03 DIAGNOSIS — Z98.890 POST-OPERATIVE STATE: ICD-10-CM

## 2024-07-03 DIAGNOSIS — M25.441 SWELLING OF HAND JOINT, RIGHT: ICD-10-CM

## 2024-07-03 DIAGNOSIS — M24.549 CONTRACTURE OF JOINT OF FINGER, UNSPECIFIED LATERALITY: ICD-10-CM

## 2024-07-03 DIAGNOSIS — M25.60 RANGE OF MOTION DEFICIT: ICD-10-CM

## 2024-07-03 DIAGNOSIS — M79.641 HAND PAIN, RIGHT: Primary | ICD-10-CM

## 2024-07-03 PROCEDURE — 97760 ORTHOTIC MGMT&TRAING 1ST ENC: CPT

## 2024-07-03 PROCEDURE — L3808 WHFO, RIGID W/O JOINTS: HCPCS

## 2024-07-09 ENCOUNTER — TELEPHONE (OUTPATIENT)
Dept: ORTHOPEDICS | Facility: CLINIC | Age: 49
End: 2024-07-09
Payer: OTHER GOVERNMENT

## 2024-07-09 NOTE — PROGRESS NOTES
Occupational Therapy Orthotic Note    Patient: Jethro Ebbs III  MRN: [id number]  Date of visit: 7/3/2024  Referring Physician:  Mahesh Alcala PA-C ; Dr. SUMAN Palmer     Primary Diagnosis: R MP capusulotomies and arthroplasties to digits 2-5; PIP fusions digits 2, 3, 5 and boutonierre deformity correction to thumb 6/26/24     Treatment Diagnosis:   Encounter Diagnoses   Name Primary?    Post-operative state     Contracture of joint of finger, unspecified laterality     Hand pain, right Yes    Range of motion deficit     Swelling of hand joint, right      Date of Surgery: 6/26/24  RIGHT 2ND, 3RD, 4TH AND 5TH MCPJ SOFT TISSUE (WITH CAPSULOTOMIES) AND BALANCING WITH ARTHTOPLASTIES (Right)  INDEX, LONG, AND SMALL FINGER PIP VOLAR PLATE RELEAE AND FUSION WITH CAPSULOTOMY (Right)  BOUTIENIERE DEFORMITY OF THUMB CORRECTION (Right)  CLOSED REDUCTION, PIP JOINT, ARTICULAR, RING FINGER       Subjective:     Patient is 47 yo male who presents this date for orthotic fabrication and fitting following hand surgery on 6/26/24.     Objective:     Increased time spent in removal of post op dressing, cleaning and re-dressing of wound.     Patient seen by OT this session for fabrication of MP blocking orthosis per MD orders to maintain digits in resting position and to support thumb deformity correction.     Fabricated volar  Wrist-Hand-Finger Orthosis () with MP's positioned near neutral with cascade of fingers and thumb in slight abduction as able.  Attempted to align in normal position to avoid ulnar drift as able.   Assessment:     Orthosis with good fit following fabrication. Pt. Requiring assistance from spouse to katie/doff. Spouse appears IND with understanding of proper positioning;      Patient Education/Response:     Orthotic Fit and Training, 15 minutes:    Pt. Instructed to wear continuous, remove for bathing or hygiene.  Patient/caregiver were provided written instructions on orthosis purpose, wear schedule, care and  precautions to monitor for increased pain/edema, pressure points, skin breakdown or redness/skin irritation Patient/caregiver to contact clinic for adjustments as needed.  Patient to followup on 7/10/24 for full OT evaluation; see updated POC for details     Plans and Goals:     Patient to follow up as necessary for adjustments for fit.     Goal of Orthosis:  After instruction, the patient/ spouse will demonstrate proper donning/doffing of splint, splint care, and wear precautions,  to insure correct follow through with home care program.       Therapist: LETITIA Dennis, ABRILT

## 2024-07-10 ENCOUNTER — CLINICAL SUPPORT (OUTPATIENT)
Dept: REHABILITATION | Facility: HOSPITAL | Age: 49
End: 2024-07-10
Payer: OTHER GOVERNMENT

## 2024-07-10 ENCOUNTER — OFFICE VISIT (OUTPATIENT)
Dept: ORTHOPEDICS | Facility: CLINIC | Age: 49
End: 2024-07-10
Payer: OTHER GOVERNMENT

## 2024-07-10 VITALS — WEIGHT: 119.94 LBS | HEIGHT: 68 IN | BODY MASS INDEX: 18.18 KG/M2

## 2024-07-10 DIAGNOSIS — M25.441 SWELLING OF HAND JOINT, RIGHT: ICD-10-CM

## 2024-07-10 DIAGNOSIS — Z98.890 POST-OPERATIVE STATE: Primary | ICD-10-CM

## 2024-07-10 DIAGNOSIS — M25.60 RANGE OF MOTION DEFICIT: ICD-10-CM

## 2024-07-10 DIAGNOSIS — M24.549 CONTRACTURE OF JOINT OF FINGER, UNSPECIFIED LATERALITY: ICD-10-CM

## 2024-07-10 DIAGNOSIS — M79.641 HAND PAIN, RIGHT: Primary | ICD-10-CM

## 2024-07-10 PROCEDURE — 99213 OFFICE O/P EST LOW 20 MIN: CPT | Mod: PBBFAC | Performed by: SPECIALIST/TECHNOLOGIST

## 2024-07-10 PROCEDURE — 97166 OT EVAL MOD COMPLEX 45 MIN: CPT

## 2024-07-10 PROCEDURE — 99024 POSTOP FOLLOW-UP VISIT: CPT | Mod: ,,, | Performed by: SPECIALIST/TECHNOLOGIST

## 2024-07-10 PROCEDURE — 97530 THERAPEUTIC ACTIVITIES: CPT

## 2024-07-10 PROCEDURE — 99999 PR PBB SHADOW E&M-EST. PATIENT-LVL III: CPT | Mod: PBBFAC,,, | Performed by: SPECIALIST/TECHNOLOGIST

## 2024-07-10 NOTE — PATIENT INSTRUCTIONS
"[facility] - OCCUPATIONAL THERAPY  HOME EXERCISE PROGRAM   Run hand under hot water, wait 5 days to soak hand, but then soak hand in hot   Water for 5-10 min before exercises or in the morning to improve mobility.     Use cold pack for 10 min at night for swelling , pain, inflammation as needed.   Complete the following massages for 5 minutes each, 2x/day.                   Massage up/down the scars, across and circular   Motion with any lotion 2 x daily.   Complete the following exercises with 10 repetitions each, 3-4x/day.         AROM: Isolated MCP Flexion / Extension ("Wave")   OK to ACTIVELY gently bend knuckled, but then use the other hand to PASSIVELY lift   Fingers back up straight, then try to hold 3 seconds.        AROM: Composte Extension ("Finger Lifts")  OK to passively Lift each  finger off of the table one at a time, or all of them together,   And see if you can  Hold 3 seconds. Relax your finger (s)     AROM: Abduction / Adduction  With hand flat on table, spread all fingers apart, then bring them together as close as possible.Or try to separate fingers between fingers of wife's hand (like holding hands)     Copyright © I. All rights reserved.     Therapist: LETITIA Dennis, GUILLE     "

## 2024-07-10 NOTE — PROGRESS NOTES
"Mr. Uriarte is here today for a post-operative visit.  He is 14 days status post R 2nd, 3rd, 4th, and 5th MCPJ Arthroplasties, R Index, Long and Small Finger PIP Volar Plate Releasae and Fusion with capsulotomy, Boutieniere Deformity of Thumb Correction and Closed Reduction PIPJ Fusion of Ring Finger  by Dr. Lucas on 6/26/24. He reports that he is pain is minimal. He states he is taking Tylenol and Ibuprofen for pain. He was made a custom orthosis that he has been taking off during the day as well as nighttime.  He denies fever, chills, and sweats since the time of the surgery.     Physical exam:    Vitals:    07/10/24 0918   Weight: 54.4 kg (119 lb 14.9 oz)   Height: 5' 8" (1.727 m)   PainSc:   3   PainLoc: Hand     Vital signs are stable, patient is afebrile.  Patient is well dressed and well groomed, no acute distress.  Alert and oriented to person, place, and time.  Post op dressing taken down.  Incision is clean, dry and intact.  There is no erythema or exudate.  There is no sign of any infection. He is NVI. Sutures removed without difficulty.  Extensor lag of MCPs noted. Able to abduct SF. 20 degrees of motion at MCPs.     Assessment:  s/p R 2nd, 3rd, 4th, and 5th MCPJ Arthroplasties, R Index, Long and Small Finger PIP Volar Plate Releasae and Fusion with capsulotomy, Boutieniere Deformity of Thumb Correction and Closed Reduction PIPJ Fusion of Ring Finger         Plan:  Jethro was seen today for post-op evaluation.    Diagnoses and all orders for this visit:    Post-operative state    Contracture of joint of finger, unspecified laterality        PO instruction reviewed and provided to patient  Educated patient on scar massage  Reiterated patient should be wearing the brace full-time except for therapy  Discussed with patient goals as well as projected outcomes. Patient verbally understood and was in agreement with plan.   Patient will f/u in 4 weeks without XR  "

## 2024-07-11 PROBLEM — M79.641 HAND PAIN, RIGHT: Status: ACTIVE | Noted: 2024-07-11

## 2024-07-11 PROBLEM — M25.60 RANGE OF MOTION DEFICIT: Status: ACTIVE | Noted: 2024-07-11

## 2024-07-11 PROBLEM — M25.441 SWELLING OF HAND JOINT, RIGHT: Status: ACTIVE | Noted: 2024-07-11

## 2024-07-11 NOTE — PLAN OF CARE
[facility]  Occupational Therapy Initial Evaluation     Name: Jethro Uriarte III  Clinic Number: [id number]    Therapy Diagnosis:   Encounter Diagnoses   Name Primary?    Hand pain, right Yes    Range of motion deficit     Swelling of hand joint, right        Medical Diagnosis: Right MP capsulotomies and arthroplasties to all digits; PIP joint fusions digits 2, 3, 5; boutonierre deformity correction thumb, right     ICD-10:   Z98.890 (ICD-10-CM) - Post-operative state   M24.549 (ICD-10-CM) - Contracture of joint of finger, unspecified laterality       Physician: Mahesh Alcala PA-C; Dr. SUMAN Palmer   Physician Orders: Eval and Treat    Surgical Procedure and Date: 6/26/24   RIGHT 2ND, 3RD, 4TH AND 5TH MCPJ SOFT TISSUE (WITH CAPSULOTOMIES) AND BALANCING WITH ARTHTOPLASTIES (Right)  INDEX, LONG, AND SMALL FINGER PIP VOLAR PLATE RELEAE AND FUSION WITH CAPSULOTOMY (Right)  BOUTIENIERE DEFORMITY OF THUMB CORRECTION (Right)  CLOSED REDUCTION, PIP JOINT, ARTICULAR, RING FINGER    Evaluation Date: 7/10/2024    Plan of Care Certification Period: 10/10/24    Date of Return to MD: 8/7/24    Visit # / Visits authorized: 1 / 12  Insurance Authorization Period Expiration: PENDING     FOTO #1: FSM___nt__ / GOAL ___nt__  FOTO #2:   FOTO #3:     Precautions:  Standard and Weightbearing; physical disabilities     Time In:10  Time Out: 1045  Total Appointment Time (timed & untimed codes): 45 minutes    Subjective     Date of Onset: 6/26/24    History of Current Condition/Mechanism of Injury: Physician reports: Mr Uriarte is a 48-year-old male who has polymyositis almost a scleroderma picture with contractures involving both hands left greater than right his right hand the PIP and MCP joints were in significant flexion he had almost had disuse of his hands bilaterally he requires constant 8 in help he has also wheelchair-bound after CT evaluation x-ray evaluation and further discussion and expectations for surgical  treatment risks and benefits were explained to the patient in clinic consents were signed in clinic of note due to his condition any surgical procedure may result in amputation patient understands agrees with the plan     Falls: none reported     Involved Side: right  Dominant Side: Right  Imaging: See Chart   Prior Therapy: not recently     Pain:  Functional Pain Scale Rating 0-10:   1/10 on average  1/10 at best  6/10 at worst  Location: r hand and digits/thumb   Description: sore, achey, throbbing   Aggravating Factors: laying down   Easing Factors: rest, elevation,     Occupation:  Disabled   Working presently: disability  Duties: none ; likes to play video games     Functional Limitations/Social History:    Previous functional status includes: Mod assistance with all ADLs prior to surgery due to physical disability and limitations.      Current Functional Status   Home/Living environment: lives with their family, wife present;   AE - shower chair, BSC, WC, walker (not being used),            Limitation of Functional Status as follows:   ADLs/IADLs:     - Feeding: use left hand primarily, needs food cut up     - Bathing: Mod Asst     - Dressing/Grooming: Mod A    - Home Management: n/a     - Driving: not driving     - Gripping/pinching: limited B hand , pinch due to disability and hand contractures, bilaterally;      Leisure: gripping and hold video games and controllers     Patient's Goals for Therapy: be able to  and hold and play video games;     Past Medical History/Physical Systems Review:   Jethro Uriarte III  has a past medical history of Bronchospasm, Contracture of right hand, Pneumonia, Polymyositis, and Wheelchair dependence.    Jethro Uriarte III  has a past surgical history that includes Toe amputation (Left); Release of contracture of joint (Right, 6/26/2024); Capsulotomy of joint (Right, 6/26/2024); Revision of scar using Z-plasty (Right, 6/26/2024); and closed reduction, fracture, mcp or pip  joint, articular (6/26/2024).    Jethro has a current medication list which includes the following prescription(s): acetaminophen, albuterol, albuterol, calcium, ergocalciferol (vitamin d2), ibuprofen, meloxicam, mirtazapine, mycophenolate mofetil, oxycodone-acetaminophen, and rituximab.    Review of patient's allergies indicates:  No Known Allergies     Objective     Observation/Appearance:  edema noted; stitches removed this date by PA; Patient with incisions over dorsal MPs of all digits, in 1st webspace, and dorsal over index, middle and small finger P1 and PIP joints. Some maceration noted between digits following orthosis use.     Edema. Measured in centimeters.   7/10/2024       Proximal Wrist Crease 14 cm    Proximal Palmar Crease     MCPs 17 cm      Edema. Measured in centimeters. - not assessed due to stitch removal and pain this date.  Moderate digit edema noted especially in small finger.     Hand ROM. Measured in degrees.  PIPs fused     7/10/2024           Index: MP  54/NT              PIP     Fused 25* flex              DIP 27/27              GONZALEZ        Long:  MP 50/NT              PIP Fused 25* flex              DIP 30/30              GONZALEZ        Ring:   MP 55/NT              PIP Partially fused 30*              DIP 40/40              GONZALEZ        Small:  MP 40/NT               PIP Fused 25*               DIP 45/45              GONZALEZ        Thumb: MP 55/62                IP 0/0       Rad ADD/ABD nt       Pal ADD/ABD nt          Opposition nt   NO Significant AROM of IP's, MP flexion not tested actively due to post op precautions; passively able to extend MPs to 20-30*   10-15 degree ulnar drift noted; correctable with PROM     Wrist ext/flex 18 / 24   Sup/pro 50 / wfl   No significant RD/UD     Sensation- intact     Special Tests:        Strength (Dyanmometer) and Pinch Strength (Pinch Gauge)  Measured in pounds and psi. Average of three trials.   7/10/2024 7/10/2024    Right  Left    Rung II NT NT    Key Pinch NT NT   3pt Pinch NT NT   2pt Pinch NT NT       Manual Muscle Testing:NT        CMS Impairment/Limitation/Restriction for FOTO Survey    Therapist reviewed FOTO scores for Jethro Uriarte III on 7/10/2024.   FOTO documents entered into [employer] - see Media section.    Limitation Score: ___NT___%      Goal: __NT____%  Category: Self Care       Treatment     Total Treatment time separate from Evaluation time:25 MIN    Jethro received the treatments listed below:      Supervised Modalities after being cleared for contradictions: MH x 10 min due to stitch removal this date.     Therapeutic Activities to improve functional performance for 15  minutes, including:  -Gentle active MP flexion and passive MP extension   - Place and hold for MP extension x 3 sec x 10 reps  - place and hold for individual digit extension x 3 sec x 10 reps, and passively composite place and hold for digit extension and hold 3 sec x 10 reps   - digit ab/ad active/passive and   - clean between digits and dry with towel to avoid maceration   - will fabricate hand based MP extension orthosis with IP support for night use with orfit casting tape next session.   - reviewed modality use for pain, stiffness   - instructed and performed scar massage and use of washcloth for eschar debridement.     Patient Education and Home Exercises      Education provided:   -role of OT, goals for OT, scheduling/cancellations, insurance limitations with patient.  -Additional Education provided: YES    Written Home Exercises Provided: yes.  Exercises were reviewed and Jethro was able to demonstrate them prior to the end of the session.    Jethro demonstrated good  understanding of the education provided.     Pt was advised to perform these exercises free of pain, and to stop performing them if pain occurs.    See EMR under Patient Instructions for exercises provided 7/10/2024.    Assessment     Jethro Uriarte III is a 48 y.o. male referred to outpatient occupational therapy  and presents with a medical diagnosis of MP capsulotomies and arthroplasties of digits 2-5; boutonierre deformity correction of thumb and PIP fusion of digits 2, 3 and 5.      Assessment of Occupational Performance   Performance Deficits    Physical:  Joint Mobility  Joint Stability  Muscle Power/Strength  Muscle Endurance  Skin Integrity/Scar Formation  Edema   Strength  Pinch Strength  Gross Motor Coordination  Fine Motor Coordination  Pain    Cognitive:  NONE     Psychosocial:    HABITS   ROUTINES      Following medical record review it is determined that pt will benefit from occupational therapy services in order to maximize pain free and/or functional use of right hand. The following goals were discussed with the patient and patient is in agreement with them as to be addressed in the treatment plan. The patient's rehab potential is Good.     Anticipated barriers to occupational therapy: Physical disabilities, pain tolerance, physical limitations     Plan of care discussed with patient: Yes   Patient's spiritual, cultural and educational needs considered and patient is agreeable to the plan of care and goals as stated below:     Medical Necessity is demonstrated by the following  Occupational Profile/History  Co-morbidities and personal factors that may impact the plan of care [] LOW: Brief chart review  [x] MODERATE: Expanded chart review   [] HIGH: Extensive chart review    Moderate / High Support Documentation: NONE      Examination  Performance deficits relating to physical, cognitive or psychosocial skills that result in activity limitations and/or participation restrictions  [] LOW: addressing 1-3 Performance deficits  [x] MODERATE: 3-5 Performance deficits  [] HIGH: 5+ Performance deficits (please support below)    Moderate / High Support Documentation: NONE      Treatment Options [] LOW: Limited options   X MODERATE: Several options  [] HIGH: Multiple options      Decision Making/ Complexity Score:  Moderate Complexity        The following goals were discussed with the patient and patient is in agreement with them as to be addressed in the treatment plan.     Short Term Goals: (6 weeks):  1)  Patient to be IND with HEP and modalities for pain management  2)  Increase ROM 3-10 degrees to increase functional hand use for ADLs/work/leisure activities  3)   Decrease edema .1-.5mm to increase joint mobility /flexibility for functional hand use.      Long Term Goals :To be met by discharge (12 weeks)   1) Patient to be able to grasp game controller and operate thumb control by discharge   2)   Increase ROM 11-20 degrees to increase functional hand use for ADLs/work/leisure activities  3)  Patient to report improved functional use of hand > 40% as compared to prior to surgery.   Plan   Certification Period/Plan of care expiration: 7/10/2024 to 10/10/2024.    Outpatient Occupational Therapy 1-2 times weekly for 6-12 weeks to include the following interventions: Paraffin, Fluidotherapy, Manual therapy/joint mobilizations, Modalities for pain management, US 3 mhz, Therapeutic exercises/activities., Strengthening, Orthotic Fabrication/Fit/Training, Edema Control, Scar Management, Wound Care, and pain/symptom management.    I certify the need for these services furnished under the plan of treatment and while under my care.     ____________________________________________________________________  Physician/Referring Practitioner   Date of Signature       Meghan Peterson OT, CHT

## 2024-07-12 ENCOUNTER — CLINICAL SUPPORT (OUTPATIENT)
Dept: REHABILITATION | Facility: HOSPITAL | Age: 49
End: 2024-07-12
Payer: OTHER GOVERNMENT

## 2024-07-12 DIAGNOSIS — M24.549 CONTRACTURE OF JOINT OF FINGER, UNSPECIFIED LATERALITY: ICD-10-CM

## 2024-07-12 DIAGNOSIS — M25.441 SWELLING OF HAND JOINT, RIGHT: ICD-10-CM

## 2024-07-12 DIAGNOSIS — M79.641 HAND PAIN, RIGHT: Primary | ICD-10-CM

## 2024-07-12 DIAGNOSIS — M25.60 RANGE OF MOTION DEFICIT: ICD-10-CM

## 2024-07-12 PROCEDURE — 97530 THERAPEUTIC ACTIVITIES: CPT

## 2024-07-12 PROCEDURE — 97140 MANUAL THERAPY 1/> REGIONS: CPT

## 2024-07-12 PROCEDURE — 97018 PARAFFIN BATH THERAPY: CPT

## 2024-07-16 ENCOUNTER — CLINICAL SUPPORT (OUTPATIENT)
Dept: REHABILITATION | Facility: HOSPITAL | Age: 49
End: 2024-07-16
Payer: OTHER GOVERNMENT

## 2024-07-16 DIAGNOSIS — M25.441 SWELLING OF HAND JOINT, RIGHT: ICD-10-CM

## 2024-07-16 DIAGNOSIS — M79.641 HAND PAIN, RIGHT: Primary | ICD-10-CM

## 2024-07-16 DIAGNOSIS — M24.549 CONTRACTURE OF JOINT OF FINGER, UNSPECIFIED LATERALITY: ICD-10-CM

## 2024-07-16 DIAGNOSIS — Z98.890 POST-OPERATIVE STATE: ICD-10-CM

## 2024-07-16 DIAGNOSIS — M25.60 RANGE OF MOTION DEFICIT: ICD-10-CM

## 2024-07-16 PROCEDURE — 97760 ORTHOTIC MGMT&TRAING 1ST ENC: CPT

## 2024-07-16 PROCEDURE — 97140 MANUAL THERAPY 1/> REGIONS: CPT

## 2024-07-16 PROCEDURE — 97530 THERAPEUTIC ACTIVITIES: CPT

## 2024-07-16 NOTE — PROGRESS NOTES
[facility]  Occupational Therapy Treatment Note    Date: 7/12/2024  Name: Jethro Uriarte III  Clinic Number: [id number]    Therapy Diagnosis:        Encounter Diagnoses   Name Primary?    Hand pain, right Yes    Range of motion deficit      Swelling of hand joint, right           Medical Diagnosis: Right MP capsulotomies and arthroplasties to all digits; PIP joint fusions digits 2, 3, 5; boutonierre deformity correction thumb, right      ICD-10:   Z98.890 (ICD-10-CM) - Post-operative state   M24.549 (ICD-10-CM) - Contracture of joint of finger, unspecified laterality         Physician: Mahesh Alcala PA-C; Dr. SUMAN Palmer   Physician Orders: Eval and Treat     Surgical Procedure and Date: 6/26/24   RIGHT 2ND, 3RD, 4TH AND 5TH MCPJ SOFT TISSUE (WITH CAPSULOTOMIES) AND BALANCING WITH ARTHTOPLASTIES (Right)  INDEX, LONG, AND SMALL FINGER PIP VOLAR PLATE RELEAE AND FUSION WITH CAPSULOTOMY (Right)  BOUTIENIERE DEFORMITY OF THUMB CORRECTION (Right)  CLOSED REDUCTION, PIP JOINT, ARTICULAR, RING FINGER     Evaluation Date: 7/10/2024     Plan of Care Certification Period: 10/10/24     Date of Return to MD: 8/7/24     Visit # / Visits authorized: 2 / 14  Insurance Authorization Period Expiration: 10/13/24     FOTO #1: FSM___nt__ / GOAL ___nt__     Precautions:  Standard and Weightbearing; physical disabilities      Time In:10  Time Out: 1045  Total Appointment Time (timed & untimed codes): 45 minutes    SUBJECTIVE     Pt reports: I'm a little frustrated, the index hurts when you move it. The brace is ok, I don't sleep in it though  He was compliant with home exercise program given last session.   Response to previous treatment:slightly less pain   Functional change: none     Pain: 5/10  Location: right fingers dorsal, sore, tender, hurts       OBJECTIVE     Objective Measures updated at progress report unless specified.    Observation: Mr Uriarte is a 48-year-old male who has polymyositis almost a  scleroderma picture with contractures involving both hands left greater than right.  He had almost  disuse of his hands bilaterally he requires constant aid and  help;  he has also wheelchair-bound     Edema. Measured in centimeters.    7/10/2024         Proximal Wrist Crease 14 cm    Proximal Palmar Crease      MCPs 17 cm       Edema. Measured in centimeters. - not assessed due to stitch removal and pain this date.  Moderate digit edema noted especially in small finger.      Hand ROM. Measured in degrees.  PIPs fused       7/10/2024               Index: MP  54/NT              PIP     Fused 25* flex              DIP 27/27              GONZALEZ           Long:  MP 50/NT              PIP Fused 25* flex              DIP 30/30              GONZALEZ           Ring:   MP 55/NT              PIP Partially fused 30*              DIP 40/40              GONZALEZ           Small:  MP 40/NT               PIP Fused 25*               DIP 45/45              GONZALEZ           Thumb: MP 55/62                IP 0/0       Rad ADD/ABD nt       Pal ADD/ABD nt          Opposition nt   NO Significant AROM of IP's, MP flexion not tested actively due to post op precautions; passively able to extend MPs to 20-30*   10-15 degree ulnar drift noted; correctable with PROM      Wrist ext/flex 18 / 24   Sup/pro 50 / wfl   No significant RD/UD      Sensation- intact          Strength (Dyanmometer) and Pinch Strength (Pinch Gauge)  Measured in pounds and psi. Average of three trials.    7/10/2024 7/10/2024     Right  Left    Rung II NT NT   Key Pinch NT NT   3pt Pinch NT NT   2pt Pinch NT NT      Treatment     Jethro received the treatments listed below:     Supervised modalities  for 10 min after being cleared for contradictions: Paraffin bath -  poured paraffin into bag and placed hand is upon cooling and wrapped in towel to maintain heat application; monitored for pain/redness.     Therapeutic Activities to improve functional performance for 20 minutes,  including:  -Fabricated hand based MP extension orthosis with orfit casting tape with finger dividers for night use   Performed:   -Gentle active MP flexion and passive MP extension with   - Place and hold for MP extension x 3 sec x 10 reps  - place and hold for individual digit extension x 3 sec x 10 reps each   - Active thumb flexion with passive abduction , 3 sec hold, repeat  - digit ab/ad as able  - clean between digits and dry with towel to avoid maceration   - reviewed modality use for pain, stiffness        Manual therapy techniques: for 15 min (including Joint mobilizations, Myofacial release, Manual Lymphatic Drainage, Soft tissue Mobilization, and Friction Massage)  were applied to the right hand  as follows:   - debrided eschar with tweezers and sharps  - reviewed scar massage 1-2 x daily with lotion   - Gentle passive stretch to thumb to increase MP extension/abduction     Patient Education and Home Exercises      Education provided:   - Cont HEP   -Per above   - Progress towards goals     Written Home Exercises Provided: Patient instructed to cont prior HEP.  Exercises were reviewed and Jethro was able to demonstrate them prior to the end of the session.  Jethro demonstrated good  understanding of the HEP provided. See EMR under Patient Instructions for exercises provided during therapy sessions.       Assessment     Jethro is progressing well towards his goals and there are no updates to goals at this time. Pt prognosis is Fair to Good.     Pt will continue to benefit from skilled outpatient occupational therapy to address the deficits listed in the problem list on initial evaluation provide pt/family education and to maximize pt's level of independence in the home and community environment.     Pt's spiritual, cultural and educational needs considered and pt agreeable to plan of care and goals.    Anticipated barriers to occupational therapy: none     The following goals were discussed with the patient  and patient is in agreement with them as to be addressed in the treatment plan.      Short Term Goals: (6 weeks):  1)  Patient to be IND with HEP and modalities for pain management  2)  Increase ROM 3-10 degrees to increase functional hand use for ADLs/work/leisure activities  3)   Decrease edema .1-.5mm to increase joint mobility /flexibility for functional hand use.      Long Term Goals :To be met by discharge (12 weeks)   1) Patient to be able to grasp game controller and operate thumb control by discharge   2)   Increase ROM 11-20 degrees to increase functional hand use for ADLs/work/leisure activities  3)  Patient to report improved functional use of hand > 40% as compared to prior to surgery.   Plan   Certification Period/Plan of care expiration: 7/10/2024 to 10/10/2024.    Meghan Peterson OT, CHT

## 2024-07-16 NOTE — PROGRESS NOTES
[facility]  Occupational Therapy Treatment Note    Date: 7/16/2024  Name: Jethro Uriarte III  Clinic Number: [id number]    Therapy Diagnosis:        Encounter Diagnoses   Name Primary?    Hand pain, right Yes    Range of motion deficit      Swelling of hand joint, right           Medical Diagnosis: Right MP capsulotomies and arthroplasties to all digits; PIP joint fusions digits 2, 3, 5; boutonierre deformity correction thumb, right      ICD-10:   Z98.890 (ICD-10-CM) - Post-operative state   M24.549 (ICD-10-CM) - Contracture of joint of finger, unspecified laterality         Physician: Mahesh Alcala PA-C; Dr. SUMAN Palmer   Physician Orders: Eval and Treat     Surgical Procedure and Date: 6/26/24   RIGHT 2ND, 3RD, 4TH AND 5TH MCPJ SOFT TISSUE (WITH CAPSULOTOMIES) AND BALANCING WITH ARTHTOPLASTIES (Right)  INDEX, LONG, AND SMALL FINGER PIP VOLAR PLATE RELEAE AND FUSION WITH CAPSULOTOMY (Right)  BOUTIENIERE DEFORMITY OF THUMB CORRECTION (Right)  CLOSED REDUCTION, PIP JOINT, ARTICULAR, RING FINGER     Evaluation Date: 7/10/2024     Plan of Care Certification Period: 10/10/24     Date of Return to MD: 8/7/24     Visit # / Visits authorized: 2 / 14  Insurance Authorization Period Expiration: 10/13/24     FOTO #1: FSM___nt__ / GOAL ___nt__     Precautions:  Standard and Weightbearing; physical disabilities      Time In:1115  Time Out: 12  Total Appointment Time (timed & untimed codes): 45 minutes    SUBJECTIVE     Pt reports: Patient with improved mood and participation this date.  Mild pain complaints with passive index extension reported.  Improved comfort reported with B orthotic use.   He was compliant with home exercise program given last session.   Response to previous treatment:slightly less pain   Functional change: none     Pain: 5/10  Location: right fingers dorsal, sore, tender, hurts       OBJECTIVE     Objective Measures updated at progress report unless specified.    Observation:   Kalani is a 48-year-old male who has polymyositis almost a scleroderma picture with contractures involving both hands left greater than right.  He had almost  disuse of his hands bilaterally he requires constant aid and  help;  he has also wheelchair-bound     Edema. Measured in centimeters.    7/10/2024         Proximal Wrist Crease 14 cm    Proximal Palmar Crease      MCPs 17 cm       Edema. Measured in centimeters. - not assessed due to stitch removal and pain this date.  Moderate digit edema noted especially in small finger.      Hand ROM. Measured in degrees.  PIPs fused       7/10/2024               Index: MP  54/NT              PIP     Fused 25* flex              DIP 27/27              GONZALEZ           Long:  MP 50/NT              PIP Fused 25* flex              DIP 30/30              GONZALEZ           Ring:   MP 55/NT              PIP Partially fused 30*              DIP 40/40              GONZALEZ           Small:  MP 40/NT               PIP Fused 25*               DIP 45/45              GONZALEZ           Thumb: MP 55/62                IP 0/0       Rad ADD/ABD nt       Pal ADD/ABD nt          Opposition nt   NO Significant AROM of IP's, MP flexion not tested actively due to post op precautions; passively able to extend MPs to 20-30*   10-15 degree ulnar drift noted; correctable with PROM      Wrist ext/flex 18 / 24   Sup/pro 50 / wfl   No significant RD/UD      Sensation- intact          Strength (Dyanmometer) and Pinch Strength (Pinch Gauge)  Measured in pounds and psi. Average of three trials.    7/10/2024 7/10/2024     Right  Left    Rung II NT NT   Key Pinch NT NT   3pt Pinch NT NT   2pt Pinch NT NT      Treatment     Jethro received the treatments listed below:     Supervised modalities  for 10 min after being cleared for contradictions: Paraffin bath -  poured paraffin into bag and placed hand is upon cooling and wrapped in towel to maintain heat application; monitored for pain/redness.     -Ortho fit and train - 10  "min   Adjusted WHFO with foam dividers to improve comfort, fit and alignment of digits and thumb and adjusted hand based MP extension orthosis (for night use) to improve comfort and fit.      Therapeutic Activities to improve functional performance for 15 minutes, including:  -Gentle active MP flexion and passive MP extension with   - Place and hold for MP extension x 3 sec x 10 reps  - place and hold for individual digit extension x 3 sec x 10 reps each   - Active thumb flexion with passive abduction , 3 sec hold, repeat  - digit ab/ad as able  -  pom poms index-thumb opposition with no difficulty   - able to grossly grasp 2" PVC pipe for gross    - encouraged to clean between digits and dry with towel to avoid maceration   - reviewed modality use for pain, stiffness        Manual therapy techniques: for 10 min (including Joint mobilizations, Myofacial release, Manual Lymphatic Drainage, Soft tissue Mobilization, and Friction Massage)  were applied to the right hand  as follows:   - scar massage with massage stick and by hand   - bacitracin to skin breakdown b/w index-MF  - Gentle passive stretch to thumb to increase MP extension/abduction     Patient Education and Home Exercises      Education provided:   - Cont HEP   -Per above   - Progress towards goals     Written Home Exercises Provided: Patient instructed to cont prior HEP.  Exercises were reviewed and Jethro was able to demonstrate them prior to the end of the session.  Jethro demonstrated good  understanding of the HEP provided. See EMR under Patient Instructions for exercises provided during therapy sessions.       Assessment     Jethro is progressing well towards his goals and there are no updates to goals at this time. Pt prognosis is Fair to Good.     Pt will continue to benefit from skilled outpatient occupational therapy to address the deficits listed in the problem list on initial evaluation provide pt/family education and to maximize pt's " level of independence in the home and community environment.     Pt's spiritual, cultural and educational needs considered and pt agreeable to plan of care and goals.    Anticipated barriers to occupational therapy: none     The following goals were discussed with the patient and patient is in agreement with them as to be addressed in the treatment plan.      Short Term Goals: (6 weeks):  1)  Patient to be IND with HEP and modalities for pain management  2)  Increase ROM 3-10 degrees to increase functional hand use for ADLs/work/leisure activities  3)   Decrease edema .1-.5mm to increase joint mobility /flexibility for functional hand use.      Long Term Goals :To be met by discharge (12 weeks)   1) Patient to be able to grasp game controller and operate thumb control by discharge   2)   Increase ROM 11-20 degrees to increase functional hand use for ADLs/work/leisure activities  3)  Patient to report improved functional use of hand > 40% as compared to prior to surgery.   Plan   Certification Period/Plan of care expiration: 7/10/2024 to 10/10/2024.    Meghan Peterson, OT, CHT  Improved comfort, scar massage, place and hold

## 2024-07-19 ENCOUNTER — CLINICAL SUPPORT (OUTPATIENT)
Dept: REHABILITATION | Facility: HOSPITAL | Age: 49
End: 2024-07-19
Payer: OTHER GOVERNMENT

## 2024-07-19 DIAGNOSIS — M79.641 HAND PAIN, RIGHT: Primary | ICD-10-CM

## 2024-07-19 DIAGNOSIS — M25.60 RANGE OF MOTION DEFICIT: ICD-10-CM

## 2024-07-19 DIAGNOSIS — M25.441 SWELLING OF HAND JOINT, RIGHT: ICD-10-CM

## 2024-07-19 PROCEDURE — 97530 THERAPEUTIC ACTIVITIES: CPT

## 2024-07-19 PROCEDURE — 97140 MANUAL THERAPY 1/> REGIONS: CPT

## 2024-07-19 PROCEDURE — 97018 PARAFFIN BATH THERAPY: CPT

## 2024-07-19 NOTE — PROGRESS NOTES
[facility]  Occupational Therapy Treatment Note    Date: 7/19/2024  Name: Jethro Uriarte III  Clinic Number: [id number]    Therapy Diagnosis:        Encounter Diagnoses   Name Primary?    Hand pain, right Yes    Range of motion deficit      Swelling of hand joint, right           Medical Diagnosis: Right MP capsulotomies and arthroplasties to all digits; PIP joint fusions digits 2, 3, 5; boutonierre deformity correction thumb, right      ICD-10:   Z98.890 (ICD-10-CM) - Post-operative state   M24.549 (ICD-10-CM) - Contracture of joint of finger, unspecified laterality         Physician: Mahesh Alcala PA-C; Dr. SUMAN Palmer   Physician Orders: Eval and Treat     Surgical Procedure and Date: 6/26/24   RIGHT 2ND, 3RD, 4TH AND 5TH MCPJ SOFT TISSUE (WITH CAPSULOTOMIES) AND BALANCING WITH ARTHTOPLASTIES (Right)  INDEX, LONG, AND SMALL FINGER PIP VOLAR PLATE RELEAE AND FUSION WITH CAPSULOTOMY (Right)  BOUTIENIERE DEFORMITY OF THUMB CORRECTION (Right)  CLOSED REDUCTION, PIP JOINT, ARTICULAR, RING FINGER     Evaluation Date: 7/10/2024     Plan of Care Certification Period: 10/10/24     Date of Return to MD: 8/7/24     Visit # / Visits authorized: 4 / 14  Insurance Authorization Period Expiration: 10/13/24     FOTO #1: FSM___nt__ / GOAL ___nt__     Precautions:  Standard and Weightbearing; physical disabilities      Time In:1245  Time Out: 130  Total Appointment Time (timed & untimed codes): 45 minutes    SUBJECTIVE     Pt reports:  Patient appears pleased with thumb abduction  and eager to use hand for game controller.  Patient eager to type on phone this date.   He was compliant with home exercise program given last session.   Response to previous treatment:slightly less pain   Functional change: none     Pain: 5/10  Location: right fingers dorsal, sore, tender, hurts       OBJECTIVE     Objective Measures updated at progress report unless specified.    Observation: Mr Uriarte is a 48-year-old male who  has polymyositis almost a scleroderma picture with contractures involving both hands left greater than right.  He had almost  disuse of his hands bilaterally he requires constant aid and  help;  he has also wheelchair-bound     Edema. Measured in centimeters.    7/10/2024         Proximal Wrist Crease 14 cm    Proximal Palmar Crease      MCPs 17 cm       Edema. Measured in centimeters. - not assessed due to stitch removal and pain this date.  Moderate digit edema noted especially in small finger.      Hand ROM. Measured in degrees.  PIPs fused       7/10/2024               Index: MP  54/NT              PIP     Fused 25* flex              DIP 27/27              GONZALEZ           Long:  MP 50/NT              PIP Fused 25* flex              DIP 30/30              GONZALEZ           Ring:   MP 55/NT              PIP Partially fused 30*              DIP 40/40              GONZALEZ           Small:  MP 40/NT               PIP Fused 25*               DIP 45/45              GONZALEZ           Thumb: MP 55/62                IP 0/0       Rad ADD/ABD nt       Pal ADD/ABD nt          Opposition nt   NO Significant AROM of IP's, MP flexion not tested actively due to post op precautions; passively able to extend MPs to 20-30*   10-15 degree ulnar drift noted; correctable with PROM      Wrist ext/flex 18 / 24   Sup/pro 50 / wfl   No significant RD/UD      Sensation- intact          Strength (Dyanmometer) and Pinch Strength (Pinch Gauge)  Measured in pounds and psi. Average of three trials.    7/10/2024 7/10/2024     Right  Left    Rung II NT NT   Key Pinch NT NT   3pt Pinch NT NT   2pt Pinch NT NT      Treatment     Jethro received the treatments listed below:     Supervised modalities  for 10 min after being cleared for contradictions: Paraffin bath -  poured paraffin into bag and placed on top of hand as it was too hot to don; wrapped in towel to maintain heat application to decrease joint stiffness and improve flexibility; monitored for  "pain/redness.      Therapeutic Activities to improve functional performance for 20 minutes, including:  -Gentle composite active MP flexion and passive MP extension with   - Place and hold for MP extension x 3 sec x 10 reps  - place and hold for individual digit extension x 3 sec x 10 reps each   - Active thumb flexion with passive abduction , 3 sec hold, repeat  - digit ab/ad as able  -  dice with index-thumb opposition with no difficulty   - removed 10 grooved pegs from pegboard with no difficulty   - added yellow clothespin for key pinch x 10 reps with coban for easier grasp.   - able to grossly grasp 1.5 to 2" PVC pipe for gross  with supination to neutral to simulate holding game controller  - encouraged to clean between digits and dry with towel to avoid maceration   - reviewed modality use for pain, stiffness        Manual therapy techniques: for 10 min (including Joint mobilizations, Myofacial release, Manual Lymphatic Drainage, Soft tissue Mobilization, and Friction Massage)  were applied to the right hand  as follows:   - scar massage with massage stick and by hand   - bacitracin to skin breakdown b/w index-MF; re-wrapped with light cling and coban  - Gentle passive stretch to thumb to increase MP extension/abduction     Patient Education and Home Exercises      Education provided:   - Cont HEP   -Per above   - Progress towards goals     Written Home Exercises Provided: Patient instructed to cont prior HEP.  Exercises were reviewed and Jethro was able to demonstrate them prior to the end of the session.  Jethro demonstrated good  understanding of the HEP provided. See EMR under Patient Instructions for exercises provided during therapy sessions.       Assessment     Jethro is progressing well towards his goals and there are no updates to goals at this time. Pt prognosis is Fair to Good. MP active extension remains limited; Progressing with thumb flexion/abduction.  Opposition index-thumb improving " for functional light activities; Will progress as able.     Pt will continue to benefit from skilled outpatient occupational therapy to address the deficits listed in the problem list on initial evaluation provide pt/family education and to maximize pt's level of independence in the home and community environment.     Pt's spiritual, cultural and educational needs considered and pt agreeable to plan of care and goals.    Anticipated barriers to occupational therapy: none     The following goals were discussed with the patient and patient is in agreement with them as to be addressed in the treatment plan.      Short Term Goals: (6 weeks):  1)  Patient to be IND with HEP and modalities for pain management  2)  Increase ROM 3-10 degrees to increase functional hand use for ADLs/work/leisure activities  3)   Decrease edema .1-.5mm to increase joint mobility /flexibility for functional hand use.      Long Term Goals :To be met by discharge (12 weeks)   1) Patient to be able to grasp game controller and operate thumb control by discharge   2)   Increase ROM 11-20 degrees to increase functional hand use for ADLs/work/leisure activities  3)  Patient to report improved functional use of hand > 40% as compared to prior to surgery.   Plan   Certification Period/Plan of care expiration: 7/10/2024 to 10/10/2024.    Meghan Peterson, OT, CHT

## 2024-07-22 ENCOUNTER — CLINICAL SUPPORT (OUTPATIENT)
Dept: REHABILITATION | Facility: HOSPITAL | Age: 49
End: 2024-07-22
Payer: OTHER GOVERNMENT

## 2024-07-22 DIAGNOSIS — M25.441 SWELLING OF HAND JOINT, RIGHT: ICD-10-CM

## 2024-07-22 DIAGNOSIS — M25.60 RANGE OF MOTION DEFICIT: ICD-10-CM

## 2024-07-22 DIAGNOSIS — M79.641 HAND PAIN, RIGHT: Primary | ICD-10-CM

## 2024-07-22 PROCEDURE — 97530 THERAPEUTIC ACTIVITIES: CPT

## 2024-07-22 PROCEDURE — 97018 PARAFFIN BATH THERAPY: CPT

## 2024-07-22 PROCEDURE — 97140 MANUAL THERAPY 1/> REGIONS: CPT

## 2024-07-22 NOTE — PROGRESS NOTES
[facility]  Occupational Therapy Treatment Note    Date: 7/22/2024  Name: Jethro Uriarte III  Clinic Number: [id number]    Therapy Diagnosis:        Encounter Diagnoses   Name Primary?    Hand pain, right Yes    Range of motion deficit      Swelling of hand joint, right           Medical Diagnosis: Right MP capsulotomies and arthroplasties to all digits; PIP joint fusions digits 2, 3, 5; boutonierre deformity correction thumb, right      ICD-10:   Z98.890 (ICD-10-CM) - Post-operative state   M24.549 (ICD-10-CM) - Contracture of joint of finger, unspecified laterality         Physician: Mahesh Alcala PA-C; Dr. SUMAN Palmer   Physician Orders: Eval and Treat     Surgical Procedure and Date: 6/26/24   RIGHT 2ND, 3RD, 4TH AND 5TH MCPJ SOFT TISSUE (WITH CAPSULOTOMIES) AND BALANCING WITH ARTHTOPLASTIES (Right)  INDEX, LONG, AND SMALL FINGER PIP VOLAR PLATE RELEAE AND FUSION WITH CAPSULOTOMY (Right)  BOUTIENIERE DEFORMITY OF THUMB CORRECTION (Right)  CLOSED REDUCTION, PIP JOINT, ARTICULAR, RING FINGER     Evaluation Date: 7/10/2024     Plan of Care Certification Period: 10/10/24     Date of Return to MD: 8/7/24     Visit # / Visits authorized: 5 / 14  Insurance Authorization Period Expiration: 10/13/24     FOTO #1: FSM___nt__ / GOAL ___nt__     Precautions:  Standard and Weightbearing; physical disabilities      Time In:215  Time Out: 3  Total Appointment Time (timed & untimed codes): 45 minutes    SUBJECTIVE     Pt reports:  Patient appears pleased with thumb abduction  and eager to use hand for game controller.      He was compliant with home exercise program given last session.   Response to previous treatment:slightly less pain   Functional change: none     Pain: 5/10  Location: right fingers dorsal, sore, tender, hurts       OBJECTIVE     Objective Measures updated at progress report unless specified.    Observation: Mr Uriarte is a 48-year-old male who has polymyositis almost a scleroderma  picture with contractures involving both hands left greater than right.  He had almost  disuse of his hands bilaterally he requires constant aid and  help;  he has also wheelchair-bound     Edema. Measured in centimeters.    7/10/2024         Proximal Wrist Crease 14 cm    Proximal Palmar Crease      MCPs 17 cm       Edema. Measured in centimeters. - not assessed due to stitch removal and pain this date.  Moderate digit edema noted especially in small finger.      Hand ROM. Measured in degrees.  PIPs fused       7/10/2024               Index: MP  54/NT              PIP     Fused 25* flex              DIP 27/27              GONZALEZ           Long:  MP 50/NT              PIP Fused 25* flex              DIP 30/30              GONZALEZ           Ring:   MP 55/NT              PIP Partially fused 30*              DIP 40/40              GONZALZE           Small:  MP 40/NT               PIP Fused 25*               DIP 45/45              GONZALEZ           Thumb: MP 55/62                IP 0/0       Rad ADD/ABD nt       Pal ADD/ABD nt          Opposition nt   NO Significant AROM of IP's, MP flexion not tested actively due to post op precautions; passively able to extend MPs to 20-30*   10-15 degree ulnar drift noted; correctable with PROM      Wrist ext/flex 18 / 24   Sup/pro 50 / wfl   No significant RD/UD      Sensation- intact          Strength (Dyanmometer) and Pinch Strength (Pinch Gauge)  Measured in pounds and psi. Average of three trials.    7/10/2024 7/10/2024     Right  Left    Rung II NT NT   Key Pinch NT NT   3pt Pinch NT NT   2pt Pinch NT NT      Treatment     Jethro received the treatments listed below:     Supervised modalities  for 10 min after being cleared for contradictions: Paraffin bath -  poured paraffin into bag and placed on top of hand as it was too hot to don; wrapped in towel  to maintain heat application to decrease joint stiffness and improve flexibilityand placed on therabar to maintain MP's in extension;  "monitored for pain/redness.      Therapeutic Activities to improve functional performance for 20 minutes, including:  -Gentle composite active MP flexion and passive MP extension with   - Place and hold for MP extension x 3 sec x 10 reps  - place and hold for individual digit extension x 3 sec x 10 reps each   - Active thumb flexion with passive abduction , 3 sec hold, repeat  - digit ab/ad as able  -  dice and pom poms with index-thumb opposition with no difficulty   - removed 10 grooved pegs from pegboard with no difficulty   - added yellow clothespin for key pinch x 10-12 reps with coban for easier grasp.   - able to grossly grasp  2" PVC pipe for gross  with supination to neutral to simulate holding game controller  - encouraged to clean between digits and dry with towel to avoid maceration   - reviewed modality use for pain, stiffness        Manual therapy techniques: for 10 min (including Joint mobilizations, Myofacial release, Manual Lymphatic Drainage, Soft tissue Mobilization, and Friction Massage)  were applied to the right hand  as follows:   - scar massage with massage stick and by hand   - bacitracin to skin breakdown b/w index-MF; re-wrapped with light cling and coban following tx session  - Gentle passive stretch to thumb to increase MP extension/abduction   - added gentle passive supination and wrist flex/ext  to improve rotation to hold game controller     Patient Education and Home Exercises      Education provided:   - Cont HEP   -Per above   - Progress towards goals     Written Home Exercises Provided: Patient instructed to cont prior HEP.  Exercises were reviewed and Jethro was able to demonstrate them prior to the end of the session.  Jethro demonstrated good  understanding of the HEP provided. See EMR under Patient Instructions for exercises provided during therapy sessions.       Assessment     Jethro is progressing well towards his goals and there are no updates to goals at this " time. Pt prognosis is Fair to Good. MP active extension remains limited; Progressing with thumb flexion/abduction.  Opposition index-thumb improving for functional light activities; Will progress as able.     Pt will continue to benefit from skilled outpatient occupational therapy to address the deficits listed in the problem list on initial evaluation provide pt/family education and to maximize pt's level of independence in the home and community environment.     Pt's spiritual, cultural and educational needs considered and pt agreeable to plan of care and goals.    Anticipated barriers to occupational therapy: none     The following goals were discussed with the patient and patient is in agreement with them as to be addressed in the treatment plan.      Short Term Goals: (6 weeks):  1)  Patient to be IND with HEP and modalities for pain management  2)  Increase ROM 3-10 degrees to increase functional hand use for ADLs/work/leisure activities  3)   Decrease edema .1-.5mm to increase joint mobility /flexibility for functional hand use.      Long Term Goals :To be met by discharge (12 weeks)   1) Patient to be able to grasp game controller and operate thumb control by discharge   2)   Increase ROM 11-20 degrees to increase functional hand use for ADLs/work/leisure activities  3)  Patient to report improved functional use of hand > 40% as compared to prior to surgery.   Plan   Certification Period/Plan of care expiration: 7/10/2024 to 10/10/2024.    Meghan Peterson OT, CHT

## 2024-07-24 ENCOUNTER — CLINICAL SUPPORT (OUTPATIENT)
Dept: REHABILITATION | Facility: HOSPITAL | Age: 49
End: 2024-07-24
Payer: OTHER GOVERNMENT

## 2024-07-24 DIAGNOSIS — M79.641 HAND PAIN, RIGHT: Primary | ICD-10-CM

## 2024-07-24 DIAGNOSIS — M25.60 RANGE OF MOTION DEFICIT: ICD-10-CM

## 2024-07-24 DIAGNOSIS — M25.441 SWELLING OF HAND JOINT, RIGHT: ICD-10-CM

## 2024-07-24 PROCEDURE — 97018 PARAFFIN BATH THERAPY: CPT

## 2024-07-24 PROCEDURE — 97140 MANUAL THERAPY 1/> REGIONS: CPT

## 2024-07-24 PROCEDURE — 97530 THERAPEUTIC ACTIVITIES: CPT

## 2024-07-24 NOTE — PROGRESS NOTES
[facility]  Occupational Therapy Treatment Note    Date: 7/24/2024  [facility]  [facility] Number: [id number]    Therapy Diagnosis:        Encounter Diagnoses   Name Primary?    Hand pain, right Yes    Range of motion deficit      Swelling of hand joint, right           Medical Diagnosis: Right MP capsulotomies and arthroplasties to all digits; PIP joint fusions digits 2, 3, 5; boutonierre deformity correction thumb, right      ICD-10:   Z98.890 (ICD-10-CM) - Post-operative state   M24.549 (ICD-10-CM) - Contracture of joint of finger, unspecified laterality         Physician: Maehsh Alcala PA-C; Dr. SUMAN Palmer   Physician Orders: Eval and Treat     Surgical Procedure and Date: 6/26/24   RIGHT 2ND, 3RD, 4TH AND 5TH MCPJ SOFT TISSUE (WITH CAPSULOTOMIES) AND BALANCING WITH ARTHTOPLASTIES (Right)  INDEX, LONG, AND SMALL FINGER PIP VOLAR PLATE RELEAE AND FUSION WITH CAPSULOTOMY (Right)  BOUTIENIERE DEFORMITY OF THUMB CORRECTION (Right)  CLOSED REDUCTION, PIP JOINT, ARTICULAR, RING FINGER     Evaluation Date: 7/10/2024     Plan of Care Certification Period: 10/10/24     Date of Return to MD: 8/7/24     Visit # / Visits authorized: 5 / 14  Insurance Authorization Period Expiration: 10/13/24     FOTO #1: FSM___nt__ / GOAL ___nt__     Precautions:  Standard and Weightbearing; physical disabilities      Time In:320  Time Out: 405  Total Appointment Time (timed & untimed codes): 45 minutes    SUBJECTIVE     Pt reports:  Patient appears pleased with thumb abduction  and eager to use hand for game controller.  Wife reports they live in non-profit community with available OT/PT to assist with HEP.     He was compliant with home exercise program given last session.   Response to previous treatment:slightly less pain   Functional change: none     Pain: 5/10  Location: right fingers dorsal, sore, tender, hurts       OBJECTIVE     Objective Measures updated at progress report unless  specified.    Observation: Mr Uriarte is a 48-year-old male who has polymyositis almost a scleroderma picture with contractures involving both hands left greater than right.  He had almost  disuse of his hands bilaterally he requires constant aid and  help;  he has also wheelchair-bound     Edema. Measured in centimeters.    7/10/2024         Proximal Wrist Crease 14 cm    Proximal Palmar Crease      MCPs 17 cm       Edema. Measured in centimeters. - not assessed due to stitch removal and pain this date.  Moderate digit edema noted especially in small finger.      Hand ROM. Measured in degrees.  PIPs fused       7/10/2024               Index: MP  54/NT              PIP     Fused 25* flex              DIP 27/27              GONZALEZ           Long:  MP 50/NT              PIP Fused 25* flex              DIP 30/30              GONZALEZ           Ring:   MP 55/NT              PIP Partially fused 30*              DIP 40/40              GONZALEZ           Small:  MP 40/NT               PIP Fused 25*               DIP 45/45              GONZALEZ           Thumb: MP 55/62                IP 0/0       Rad ADD/ABD nt       Pal ADD/ABD nt          Opposition nt   NO Significant AROM of IP's, MP flexion not tested actively due to post op precautions; passively able to extend MPs to 20-30*   10-15 degree ulnar drift noted; correctable with PROM      Wrist ext/flex 18 / 24   Sup/pro 50 / wfl   No significant RD/UD      Sensation- intact          Strength (Dyanmometer) and Pinch Strength (Pinch Gauge)  Measured in pounds and psi. Average of three trials.    7/10/2024 7/10/2024     Right  Left    Rung II NT NT   Key Pinch NT NT   3pt Pinch NT NT   2pt Pinch NT NT      Treatment     Jethro received the treatments listed below:     Supervised modalities  for 10 min after being cleared for contradictions: Paraffin bath -  poured paraffin into bag and placed on top of hand as it was too hot to don; wrapped in towel  to maintain heat application to decrease  "joint stiffness and improve flexibilityand placed on therabar to maintain MP's in extension; monitored for pain/redness.      Therapeutic Activities to improve functional performance for 25 minutes, including:  - attempted holding game controller with good use of thumb for toggle and button controls; attempted holding phone with pop socket but slightly difficult to manipulate; reports he does use R thumb to occasionally text  -Gentle composite active MP flexion and passive MP extension with   - Place and hold for MP extension x 3 sec x 10 reps  - place and hold for individual digit extension x 3 sec x 10 reps each   - Active thumb flexion with passive abduction/extension , 3 sec hold, repeat  - digit ab/ad as able  -  dice and pom poms with index-thumb opposition with no difficulty   - removed 10 grooved pegs from pegboard with no difficulty   - added yellow clothespin for key pinch x 10-15 reps with coban for easier grasp.   - able to grossly grasp  1.5 and 2" PVC pipe for gross  with supination to neutral to simulate holding game controller  - added yellow putty in clinic for thumb adduction x 10 reps   - added light red rubberband for thumb abduction in limited range x 10 reps   - encouraged to clean between digits and dry with towel to avoid maceration   - reviewed modality use for pain, stiffness        Manual therapy techniques: for 10 min (including Joint mobilizations, Myofacial release, Manual Lymphatic Drainage, Soft tissue Mobilization, and Friction Massage)  were applied to the right hand  as follows:   - scar massage with mini massager and by hand   -- Gentle passive stretch to thumb to increase MP extension/abduction   - added gentle passive supination and wrist flex/ext  to improve wrist rotation and flexibility to hold game controller     Patient Education and Home Exercises      Education provided:   - Cont HEP   -Per above   - Progress towards goals     Written Home Exercises Provided: " Patient instructed to cont prior HEP.  Exercises were reviewed and Jethro was able to demonstrate them prior to the end of the session.  Jethro demonstrated good  understanding of the HEP provided. See EMR under Patient Instructions for exercises provided during therapy sessions.       Assessment     Jethro is progressing well towards his goals and there are no updates to goals at this time. Pt prognosis is Fair to Good. MP active extension remains limited; Progressing with thumb flexion/abduction.  Opposition index-thumb improving for functional light activities; Will progress as able.     Pt will continue to benefit from skilled outpatient occupational therapy to address the deficits listed in the problem list on initial evaluation provide pt/family education and to maximize pt's level of independence in the home and community environment.     Pt's spiritual, cultural and educational needs considered and pt agreeable to plan of care and goals.    Anticipated barriers to occupational therapy: none     The following goals were discussed with the patient and patient is in agreement with them as to be addressed in the treatment plan.      Short Term Goals: (6 weeks):  1)  Patient to be IND with HEP and modalities for pain management  2)  Increase ROM 3-10 degrees to increase functional hand use for ADLs/work/leisure activities  3)   Decrease edema .1-.5mm to increase joint mobility /flexibility for functional hand use.      Long Term Goals :To be met by discharge (12 weeks)   1) Patient to be able to grasp game controller and operate thumb control by discharge   2)   Increase ROM 11-20 degrees to increase functional hand use for ADLs/work/leisure activities  3)  Patient to report improved functional use of hand > 40% as compared to prior to surgery.   Plan   Certification Period/Plan of care expiration: 7/10/2024 to 10/10/2024.    Meghan Peterson, OT, CHT

## 2024-07-29 ENCOUNTER — CLINICAL SUPPORT (OUTPATIENT)
Dept: REHABILITATION | Facility: HOSPITAL | Age: 49
End: 2024-07-29
Payer: OTHER GOVERNMENT

## 2024-07-29 DIAGNOSIS — M25.60 RANGE OF MOTION DEFICIT: ICD-10-CM

## 2024-07-29 DIAGNOSIS — M25.441 SWELLING OF HAND JOINT, RIGHT: ICD-10-CM

## 2024-07-29 DIAGNOSIS — M79.641 HAND PAIN, RIGHT: Primary | ICD-10-CM

## 2024-07-29 PROCEDURE — 97530 THERAPEUTIC ACTIVITIES: CPT

## 2024-07-29 PROCEDURE — 97018 PARAFFIN BATH THERAPY: CPT

## 2024-07-29 PROCEDURE — 97140 MANUAL THERAPY 1/> REGIONS: CPT

## 2024-07-29 NOTE — PROGRESS NOTES
[facility]  Occupational Therapy Treatment Note    Date: 7/29/2024  [facility]  Cl[facility] Number: [id number]    Therapy Diagnosis:        Encounter Diagnoses   Name Primary?    Hand pain, right Yes    Range of motion deficit      Swelling of hand joint, right           Medical Diagnosis: Right MP capsulotomies and arthroplasties to all digits; PIP joint fusions digits 2, 3, 5; boutonierre deformity correction thumb, right      ICD-10:   Z98.890 (ICD-10-CM) - Post-operative state   M24.549 (ICD-10-CM) - Contracture of joint of finger, unspecified laterality         Physician: Mahesh Alcala PA-C; Dr. SUMAN Palmer   Physician Orders: Eval and Treat     Surgical Procedure and Date: 6/26/24   RIGHT 2ND, 3RD, 4TH AND 5TH MCPJ SOFT TISSUE (WITH CAPSULOTOMIES) AND BALANCING WITH ARTHTOPLASTIES (Right)  INDEX, LONG, AND SMALL FINGER PIP VOLAR PLATE RELEAE AND FUSION WITH CAPSULOTOMY (Right)  BOUTIENIERE DEFORMITY OF THUMB CORRECTION (Right)  CLOSED REDUCTION, PIP JOINT, ARTICULAR, RING FINGER     Evaluation Date: 7/10/2024     Plan of Care Certification Period: 10/10/24     Date of Return to MD: 8/7/24     Visit # / Visits authorized: 7 / 14  Insurance Authorization Period Expiration: 10/13/24     FOTO #1: FSM___nt__ / GOAL ___nt__     Precautions:  Standard and Weightbearing; physical disabilities      Time In: 2:45 PM  Time Out: 3:30 pm  Total Appointment Time (timed & untimed codes): 45 minutes    SUBJECTIVE     Pt reports:  Patient appears pleased with thumb abduction  and eager to use hand for game controller.  Wife reports they live in non-profit community with available OT/PT to assist with HEP.     He was compliant with home exercise program given last session.   Response to previous treatment:slightly less pain   Functional change: none     Pain: 5/10  Location: right fingers dorsal, sore, tender, hurts       OBJECTIVE     Objective Measures updated at progress report unless  specified.    Observation: Mr Uriarte is a 48-year-old male who has polymyositis almost a scleroderma picture with contractures involving both hands left greater than right.  He had almost  disuse of his hands bilaterally he requires constant aid and  help;  he has also wheelchair-bound     Edema. Measured in centimeters.    7/10/2024         Proximal Wrist Crease 14 cm    Proximal Palmar Crease      MCPs 17 cm       Edema. Measured in centimeters. - not assessed due to stitch removal and pain this date.  Moderate digit edema noted especially in small finger.      Hand ROM. Measured in degrees.  PIPs fused       7/10/2024               Index: MP  54/NT              PIP     Fused 25* flex              DIP 27/27              GONZALEZ           Long:  MP 50/NT              PIP Fused 25* flex              DIP 30/30              GONZALEZ           Ring:   MP 55/NT              PIP Partially fused 30*              DIP 40/40              GONZALEZ           Small:  MP 40/NT               PIP Fused 25*               DIP 45/45              GONZALEZ           Thumb: MP 55/62                IP 0/0       Rad ADD/ABD nt       Pal ADD/ABD nt          Opposition nt   NO Significant AROM of IP's, MP flexion not tested actively due to post op precautions; passively able to extend MPs to 20-30*   10-15 degree ulnar drift noted; correctable with PROM      Wrist ext/flex 18 / 24   Sup/pro 50 / wfl   No significant RD/UD      Sensation- intact          Strength (Dyanmometer) and Pinch Strength (Pinch Gauge)  Measured in pounds and psi. Average of three trials.    7/10/2024 7/10/2024     Right  Left    Rung II NT NT   Key Pinch NT NT   3pt Pinch NT NT   2pt Pinch NT NT      Treatment     Jethro received the treatments listed below:     Supervised modalities  for 10 min after being cleared for contradictions: Paraffin bath -  poured paraffin into bag and placed on top of hand as it was too hot to don; wrapped in towel  to maintain heat application to decrease  "joint stiffness and improve flexibilityand placed on therabar to maintain MP's in extension; monitored for pain/redness.      Therapeutic Activities to improve functional performance for 25 minutes, including:  - attempted holding game controller with good use of thumb for toggle and button controls; attempted holding phone with pop socket but slightly difficult to manipulate; reports he does use R thumb to occasionally text  - Gentle composite active MP flexion and passive MP extension with   - Place and hold for MP extension x 3 sec x 10 reps  - place and hold for individual digit extension x 3 sec x 10 reps each   - Active thumb flexion with passive abduction/extension , 3 sec hold, repeat  - digit ab/ad as able  -  dice and pom poms with index-thumb opposition with no difficulty (NT)  - removed 10 grooved pegs from pegboard with no difficulty (NT)  - added yellow clothespin for key pinch x 10-15 reps with coban for easier grasp.   - able to grossly grasp 1.5 and 2" PVC pipe for gross  with supination to neutral to simulate holding game controller (NT)  - added yellow putty in clinic for thumb adduction x 10 reps   - added light red rubberband for thumb abduction in limited range x 10 reps   - encouraged to clean between digits and dry with towel to avoid maceration   - reviewed modality use for pain, stiffness     - custom orthotic modifications for improved ulnar drift       Manual therapy techniques: for 10 min (including Joint mobilizations, Myofacial release, Manual Lymphatic Drainage, Soft tissue Mobilization, and Friction Massage)  were applied to the right hand  as follows:   - scar massage with mini massager and by hand   -- Gentle passive stretch to thumb to increase MP extension/abduction   - added gentle passive supination and wrist flex/ext  to improve wrist rotation and flexibility to hold game controller (NT)    Patient Education and Home Exercises      Education provided:   - Cont HEP "   -Per above   - Progress towards goals     Written Home Exercises Provided: Patient instructed to cont prior HEP.  Exercises were reviewed and Jethro was able to demonstrate them prior to the end of the session.  Jethro demonstrated good  understanding of the HEP provided. See EMR under Patient Instructions for exercises provided during therapy sessions.       Assessment     Jethro is progressing well towards his goals and there are no updates to goals at this time. Pt prognosis is Fair to Good. MP active extension remains limited; Progressing with thumb flexion/abduction.  Opposition index-thumb improving for functional light activities; Pt able to hold controller comfortably but requires increased rehearsal and range of motion to perform combinations during games. Will progress as able.     Pt will continue to benefit from skilled outpatient occupational therapy to address the deficits listed in the problem list on initial evaluation provide pt/family education and to maximize pt's level of independence in the home and community environment.     Pt's spiritual, cultural and educational needs considered and pt agreeable to plan of care and goals.    Anticipated barriers to occupational therapy: none     The following goals were discussed with the patient and patient is in agreement with them as to be addressed in the treatment plan.      Short Term Goals: (6 weeks):  1)  Patient to be IND with HEP and modalities for pain management  2)  Increase ROM 3-10 degrees to increase functional hand use for ADLs/work/leisure activities  3)   Decrease edema .1-.5mm to increase joint mobility /flexibility for functional hand use.      Long Term Goals :To be met by discharge (12 weeks)   1) Patient to be able to grasp game controller and operate thumb control by discharge   2)   Increase ROM 11-20 degrees to increase functional hand use for ADLs/work/leisure activities  3)  Patient to report improved functional use of hand > 40%  as compared to prior to surgery.   Plan   Certification Period/Plan of care expiration: 7/10/2024 to 10/10/2024.    Patel Serrato OT, CHT

## 2024-07-31 ENCOUNTER — CLINICAL SUPPORT (OUTPATIENT)
Dept: REHABILITATION | Facility: HOSPITAL | Age: 49
End: 2024-07-31
Payer: OTHER GOVERNMENT

## 2024-07-31 DIAGNOSIS — Z98.890 POST-OPERATIVE STATE: ICD-10-CM

## 2024-07-31 DIAGNOSIS — M79.641 HAND PAIN, RIGHT: Primary | ICD-10-CM

## 2024-07-31 DIAGNOSIS — M25.60 RANGE OF MOTION DEFICIT: ICD-10-CM

## 2024-07-31 DIAGNOSIS — M25.441 SWELLING OF HAND JOINT, RIGHT: ICD-10-CM

## 2024-07-31 DIAGNOSIS — M24.549 CONTRACTURE OF JOINT OF FINGER, UNSPECIFIED LATERALITY: ICD-10-CM

## 2024-07-31 PROCEDURE — 97530 THERAPEUTIC ACTIVITIES: CPT

## 2024-07-31 PROCEDURE — 97140 MANUAL THERAPY 1/> REGIONS: CPT

## 2024-07-31 PROCEDURE — 97018 PARAFFIN BATH THERAPY: CPT

## 2024-07-31 NOTE — PROGRESS NOTES
[facility]  Occupational Therapy Treatment Note    Date: 7/31/2024  [facility]  [facility] Number[facility] [id number]    Therapy Diagnosis:        Encounter Diagnoses   Name Primary?    Hand pain, right Yes    Range of motion deficit      Swelling of hand joint, right           Medical Diagnosis: Right MP capsulotomies and arthroplasties to all digits; PIP joint fusions digits 2, 3, 5; boutonierre deformity correction thumb, right 6/26/24     ICD-10:   Z98.890 (ICD-10-CM) - Post-operative state   M24.549 (ICD-10-CM) - Contracture of joint of finger, unspecified laterality         Physician: Mahesh Alcala PA-C; Dr. SUAMN Palmer   Physician Orders: Eval and Treat     Surgical Procedure and Date: 6/26/24   RIGHT 2ND, 3RD, 4TH AND 5TH MCPJ SOFT TISSUE (WITH CAPSULOTOMIES) AND BALANCING WITH ARTHTOPLASTIES (Right)  INDEX, LONG, AND SMALL FINGER PIP VOLAR PLATE RELEAE AND FUSION WITH CAPSULOTOMY (Right)  BOUTIENIERE DEFORMITY OF THUMB CORRECTION (Right)  CLOSED REDUCTION, PIP JOINT, ARTICULAR, RING FINGER     Evaluation Date: 7/10/2024     Plan of Care Certification Period: 10/10/24     Date of Return to MD: 8/7/24     Visit # / Visits authorized: 9 / 14  Insurance Authorization Period Expiration: 10/13/24     FOTO #1: FSM___nt__ / GOAL ___nt__     Precautions:  Standard and Weightbearing; physical disabilities      Time In:215  Time Out: 3  Total Appointment Time (timed & untimed codes): 45 minutes    SUBJECTIVE     Pt reports:  Patient appears pleased with thumb abduction  and eager to use hand for game controller.  Wife reports they live in non-profit community with available OT/PT to assist with HEP.     He was compliant with home exercise program given last session.   Response to previous treatment:slightly less pain   Functional change: none     Pain: 5/10  Location: right fingers dorsal, sore, tender, hurts       OBJECTIVE     Objective Measures updated at progress report unless  specified.    Observation: Mr Uriarte is a 48-year-old male who has polymyositis almost a scleroderma picture with contractures involving both hands left greater than right.  He had almost  disuse of his hands bilaterally he requires constant aid and  help;  he has also wheelchair-bound     Edema. Measured in centimeters.    7/10/2024         Proximal Wrist Crease 14 cm    Proximal Palmar Crease      MCPs 17 cm       Edema. Measured in centimeters. - not assessed due to stitch removal and pain this date.  Moderate digit edema noted especially in small finger.      Hand ROM. Measured in degrees.  PIPs fused       7/10/2024               Index: MP  54/NT              PIP     Fused 25* flex              DIP 27/27              GONZALEZ           Long:  MP 50/NT              PIP Fused 25* flex              DIP 30/30              GONZALEZ           Ring:   MP 55/NT              PIP Partially fused 30*              DIP 40/40              GONZALEZ           Small:  MP 40/NT               PIP Fused 25*               DIP 45/45              GONZALEZ           Thumb: MP 55/62                IP 0/0       Rad ADD/ABD nt       Pal ADD/ABD nt          Opposition nt   NO Significant AROM of IP's, MP flexion not tested actively due to post op precautions; passively able to extend MPs to 20-30*   10-15 degree ulnar drift noted; correctable with PROM      Wrist ext/flex 18 / 24   Sup/pro 50 / wfl   No significant RD/UD      Sensation- intact          Strength (Dyanmometer) and Pinch Strength (Pinch Gauge)  Measured in pounds and psi. Average of three trials.    7/10/2024 7/10/2024     Right  Left    Rung II NT NT   Key Pinch NT NT   3pt Pinch NT NT   2pt Pinch NT NT      Treatment     Jethro received the treatments listed below:     Supervised modalities  for 10 min after being cleared for contradictions: Paraffin bath -  poured paraffin into bag and placed on top of hand as it was too hot to don; wrapped in towel  to maintain heat application to decrease  "joint stiffness and improve flexibilityand placed on therabar to maintain MP's in extension; monitored for pain/redness.      Therapeutic Activities to improve functional performance for 25 minutes, including:  - attempted holding game controller with good use of thumb for toggle and button controls; attempted holding phone with pop socket but slightly difficult to manipulate; reports he does use R thumb to occasionally text  -Gentle composite active MP flexion and passive MP extension with   - Place and hold for MP extension x 3 sec x 10 reps  - place and hold for individual digit extension x 3 sec x 10 reps each   - Active thumb flexion with passive abduction/extension , 3 sec hold, repeat  - digit ab/ad as able  -  dice and pom poms with index-thumb opposition with no difficulty   - removed 10 grooved pegs from pegboard with no difficulty   - added yellow clothespin for key pinch x 10-15 reps with coban for easier grasp.   - able to grossly grasp  1.5 and 2" PVC pipe for gross  with supination to neutral to simulate holding game controller  - added yellow putty in clinic for thumb adduction x 10 reps   - added light red rubberband for thumb abduction in limited range x 10 reps   - encouraged to clean between digits and dry with towel to avoid maceration   - reviewed modality use for pain, stiffness        Manual therapy techniques: for 10 min (including Joint mobilizations, Myofacial release, Manual Lymphatic Drainage, Soft tissue Mobilization, and Friction Massage)  were applied to the right hand  as follows:   - scar massage with mini massager and by hand   -- Gentle passive stretch to thumb to increase MP extension/abduction   - added gentle passive supination and wrist flex/ext  to improve wrist rotation and flexibility to hold game controller     Patient Education and Home Exercises      Education provided:   - Cont HEP   -Per above   - Progress towards goals     Written Home Exercises Provided: " Patient instructed to cont prior HEP.  Exercises were reviewed and Jethro was able to demonstrate them prior to the end of the session.  Jethro demonstrated good  understanding of the HEP provided. See EMR under Patient Instructions for exercises provided during therapy sessions.       Assessment     Jethro is progressing well towards his goals and there are no updates to goals at this time. Pt prognosis is Fair to Good. MP active extension remains limited; Progressing with thumb flexion/abduction.  Opposition index-thumb improving for functional light activities; Will progress as able.     Pt will continue to benefit from skilled outpatient occupational therapy to address the deficits listed in the problem list on initial evaluation provide pt/family education and to maximize pt's level of independence in the home and community environment.     Pt's spiritual, cultural and educational needs considered and pt agreeable to plan of care and goals.    Anticipated barriers to occupational therapy: none     The following goals were discussed with the patient and patient is in agreement with them as to be addressed in the treatment plan.      Short Term Goals: (6 weeks):  1)  Patient to be IND with HEP and modalities for pain management  2)  Increase ROM 3-10 degrees to increase functional hand use for ADLs/work/leisure activities  3)   Decrease edema .1-.5mm to increase joint mobility /flexibility for functional hand use.      Long Term Goals :To be met by discharge (12 weeks)   1) Patient to be able to grasp game controller and operate thumb control by discharge   2)   Increase ROM 11-20 degrees to increase functional hand use for ADLs/work/leisure activities  3)  Patient to report improved functional use of hand > 40% as compared to prior to surgery.   Plan   Certification Period/Plan of care expiration: 7/10/2024 to 10/10/2024.    Meghan Peterson, OT, CHT

## 2024-08-01 DIAGNOSIS — R52 PAIN: Primary | ICD-10-CM

## 2024-08-02 ENCOUNTER — TELEPHONE (OUTPATIENT)
Dept: ORTHOPEDICS | Facility: CLINIC | Age: 49
End: 2024-08-02
Payer: OTHER GOVERNMENT

## 2024-08-02 NOTE — TELEPHONE ENCOUNTER
Spoke to pt and reminded him of his appointment and x-ray. Pt voiced understanding and call ended.

## 2024-08-05 ENCOUNTER — CLINICAL SUPPORT (OUTPATIENT)
Dept: REHABILITATION | Facility: HOSPITAL | Age: 49
End: 2024-08-05
Payer: OTHER GOVERNMENT

## 2024-08-05 DIAGNOSIS — M79.641 HAND PAIN, RIGHT: Primary | ICD-10-CM

## 2024-08-05 DIAGNOSIS — M25.60 RANGE OF MOTION DEFICIT: ICD-10-CM

## 2024-08-05 DIAGNOSIS — M25.441 SWELLING OF HAND JOINT, RIGHT: ICD-10-CM

## 2024-08-05 PROCEDURE — 97018 PARAFFIN BATH THERAPY: CPT

## 2024-08-05 PROCEDURE — 97530 THERAPEUTIC ACTIVITIES: CPT

## 2024-08-05 PROCEDURE — 97140 MANUAL THERAPY 1/> REGIONS: CPT

## 2024-08-07 ENCOUNTER — HOSPITAL ENCOUNTER (OUTPATIENT)
Dept: RADIOLOGY | Facility: OTHER | Age: 49
Discharge: HOME | End: 2024-08-07
Attending: SPECIALIST/TECHNOLOGIST
Payer: OTHER GOVERNMENT

## 2024-08-07 ENCOUNTER — OFFICE VISIT (OUTPATIENT)
Dept: ORTHOPEDICS | Facility: CLINIC | Age: 49
End: 2024-08-07
Payer: OTHER GOVERNMENT

## 2024-08-07 ENCOUNTER — CLINICAL SUPPORT (OUTPATIENT)
Dept: REHABILITATION | Facility: HOSPITAL | Age: 49
End: 2024-08-07
Payer: OTHER GOVERNMENT

## 2024-08-07 VITALS — HEIGHT: 68 IN | BODY MASS INDEX: 18.18 KG/M2 | WEIGHT: 119.94 LBS

## 2024-08-07 DIAGNOSIS — M25.60 RANGE OF MOTION DEFICIT: ICD-10-CM

## 2024-08-07 DIAGNOSIS — R52 PAIN: ICD-10-CM

## 2024-08-07 DIAGNOSIS — Z98.890 POST-OPERATIVE STATE: Primary | ICD-10-CM

## 2024-08-07 DIAGNOSIS — M24.549 CONTRACTURE OF JOINT OF FINGER, UNSPECIFIED LATERALITY: ICD-10-CM

## 2024-08-07 DIAGNOSIS — M25.441 SWELLING OF HAND JOINT, RIGHT: ICD-10-CM

## 2024-08-07 DIAGNOSIS — M79.641 HAND PAIN, RIGHT: Primary | ICD-10-CM

## 2024-08-07 PROCEDURE — 73130 X-RAY EXAM OF HAND: CPT | Mod: 26,RT,, | Performed by: RADIOLOGY

## 2024-08-07 PROCEDURE — 97530 THERAPEUTIC ACTIVITIES: CPT

## 2024-08-07 PROCEDURE — 99024 POSTOP FOLLOW-UP VISIT: CPT | Mod: ,,, | Performed by: SPECIALIST/TECHNOLOGIST

## 2024-08-07 PROCEDURE — 99999 PR PBB SHADOW E&M-EST. PATIENT-LVL III: CPT | Mod: PBBFAC,,, | Performed by: SPECIALIST/TECHNOLOGIST

## 2024-08-07 PROCEDURE — 73130 X-RAY EXAM OF HAND: CPT | Mod: TC,FY,RT

## 2024-08-07 PROCEDURE — 97018 PARAFFIN BATH THERAPY: CPT

## 2024-08-07 PROCEDURE — 99213 OFFICE O/P EST LOW 20 MIN: CPT | Mod: PBBFAC,25 | Performed by: SPECIALIST/TECHNOLOGIST

## 2024-08-07 PROCEDURE — 97140 MANUAL THERAPY 1/> REGIONS: CPT

## 2024-08-07 NOTE — H&P (VIEW-ONLY)
"8/7/24  Patient reports 6 weeks status postR 2nd, 3rd, 4th, and 5th MCPJ Arthroplasties, R Index, Long and Small Finger PIP Volar Plate Releasae and Fusion with capsulotomy, Boutieniere Deformity of Thumb Correction and Closed Reduction PIPJ Fusion of Ring Finger reports that he is in minimal pain.  Denies any fever chills or sweats since surgery.  He has been attending therapy regularly.    7/10/24  Mr. Uriarte is here today for a post-operative visit.  He is 14 days status post R 2nd, 3rd, 4th, and 5th MCPJ Arthroplasties, R Index, Long and Small Finger PIP Volar Plate Releasae and Fusion with capsulotomy, Boutieniere Deformity of Thumb Correction and Closed Reduction PIPJ Fusion of Ring Finger  by Dr. Lucas on 6/26/24. He reports that he is pain is minimal. He states he is taking Tylenol and Ibuprofen for pain. He was made a custom orthosis that he has been taking off during the day as well as nighttime.  He denies fever, chills, and sweats since the time of the surgery.     Physical exam:    Vitals:    08/07/24 1306   Weight: 54.4 kg (119 lb 14.9 oz)   Height: 5' 8" (1.727 m)   PainSc:   4   PainLoc: Hand     Vital signs are stable, patient is afebrile.  Patient is well dressed and well groomed, no acute distress.  Alert and oriented to person, place, and time.  Post op dressing taken down.  Incision is clean, dry and intact.  There is no erythema or exudate.  There is no sign of any infection. He is NVI. Sutures removed without difficulty.  Extensor lag of MCPs noted. Able to abduct SF. 20 degrees of motion at MCPs.     Assessment:  s/p R 2nd, 3rd, 4th, and 5th MCPJ Arthroplasties, R Index, Long and Small Finger PIP Volar Plate Releasae and Fusion with capsulotomy, Boutieniere Deformity of Thumb Correction and Closed Reduction PIPJ Fusion of Ring Finger         Plan:  Jethro was seen today for pain.    Diagnoses and all orders for this visit:    Post-operative state    Contracture of joint of finger, " unspecified laterality          PO instruction reviewed and provided to patient  We will consent patient for pin removal of the ring finger PIP joint.  We will have the pin pulled in a sterile environment.  Patient we will continue with regular therapy with no change in plan.  Patient we will follow up 2 weeks postoperatively.

## 2024-08-08 DIAGNOSIS — R52 PAIN: Primary | ICD-10-CM

## 2024-08-08 DIAGNOSIS — T84.84XA PAINFUL ORTHOPAEDIC HARDWARE: ICD-10-CM

## 2024-08-09 ENCOUNTER — ANESTHESIA EVENT (OUTPATIENT)
Dept: SURGERY | Facility: OTHER | Age: 49
End: 2024-08-09
Payer: OTHER GOVERNMENT

## 2024-08-09 NOTE — ANESTHESIA PREPROCEDURE EVALUATION
08/21/2024  Jethro Uriarte III is a 48 y.o., male.      Pre-op Assessment    I have reviewed the Patient Summary Reports.     I have reviewed the Nursing Notes. I have reviewed the NPO Status.   I have reviewed the Medications.     Review of Systems  Anesthesia Hx:    Hx Emergence Delirium           Denies Family Hx of Anesthesia complications.   Personal Hx of Anesthesia complications   Difficult Intubation                  Hematology/Oncology:  Hematology Normal   Oncology Normal                                   EENT/Dental:  chronic allergic rhinitis           Cardiovascular:  Cardiovascular Normal                   ECHO 2020  EF 55%    Negative cath 2020                         Pulmonary:         Bronchiectasis               Renal/:  Renal/ Normal                 Hepatic/GI:  Hepatic/GI Normal                 Musculoskeletal:     Polymyositis  Systemic sclerosis            Neurological:    Denies CVA.    Seizures    Wheelchair bound    Medication induced seizure - 4 years ago (no longer taking)                            Endocrine:     Chronic steroids (been off for several years)        Psych:  Psychiatric Normal                  Physical Exam  General: Well nourished and Cooperative    Airway:  Mallampati: IV   Mouth Opening: < 3 cm  TM Distance: Normal  Neck ROM: Extension Decreased    Dental:  Periodontal disease        Anesthesia Plan  Type of Anesthesia, risks & benefits discussed:    Anesthesia Type: MAC  Intra-op Monitoring Plan: Standard ASA Monitors  Post Op Pain Control Plan: multimodal analgesia  Induction:  IV  Airway Plan: Video  Informed Consent: Informed consent signed with the Patient and all parties understand the risks and agree with anesthesia plan.  All questions answered.   ASA Score: 3    Ready For Surgery From Anesthesia Perspective.     .

## 2024-08-14 ENCOUNTER — PATIENT MESSAGE (OUTPATIENT)
Dept: PREADMISSION TESTING | Facility: OTHER | Age: 49
End: 2024-08-14
Payer: OTHER GOVERNMENT

## 2024-08-14 RX ORDER — DOCUSATE SODIUM 250 MG
250 CAPSULE ORAL DAILY
COMMUNITY

## 2024-08-14 NOTE — PRE-PROCEDURE INSTRUCTIONS
Information to Prepare you for your Surgery    PRE-ADMIT TESTING -  [phone]    [address]  [facility]          Your surgery has been scheduled at [facility]. We are pleased to have the opportunity to serve you. For Further Information please call [phone].    On the day of surgery please report to the Information Desk on the 1st floor.    CONTACT YOUR PHYSICIAN'S OFFICE THE DAY PRIOR TO YOUR SURGERY TO OBTAIN YOUR ARRIVAL TIME.     The evening before surgery do not eat anything after 9 p.m. ( this includes hard candy, chewing gum and mints).  You may only have GATORADE, POWERADE AND WATER  from 9 p.m. until you leave your home.   DO NOT DRINK ANY LIQUIDS ON THE WAY TO THE HOSPITAL.      Why does your anesthesiologist allow you to drink Gatorade/Powerade before surgery?  Gatorade/Powerade helps to increase your comfort before surgery and to decrease your nausea after surgery. The carbohydrates in Gatorade/Powerade help reduce your body's stress response to surgery.  If you are a diabetic-drink only water prior to surgery.    Outpatient Surgery- May allow 2 adult (18 and older) Support Persons (1 being the designated ) for all surgical/procedural patients. A breastfeeding mother will be allowed her infant and 2 adult Support Persons. No one under the age of 18 will be allowed in the building. No swapping out of visitors in the [facility].      SPECIAL MEDICATION INSTRUCTIONS: TAKE medications checked off by the Anesthesiologist on your Medication List.    Angiogram Patients: Take medications as instructed by your physician, including aspirin.     Surgery Patients:    If you take ASPIRIN - Your PHYSICIAN/SURGEON will need to inform you IF/OR when you need to stop taking aspirin prior to your surgery.     The week prior to surgery do not ot take any medications containing IBUPROFEN or NSAIDS ( Advil, Motrin, Goodys, BC, Aleve, Naproxen etc)  If you are not sure if you should take a medicine please call your surgeon's office.  Ok to take Tylenol    Do Not Wear any make-up (especially eye make-up) to surgery. Please remove any false eyelashes or eyelash extensions. If you arrive the day of surgery with makeup/eyelashes on you will be required to remove prior to surgery. (There is a risk of corneal abrasions if eye makeup/eyelash extensions are not removed)      Leave all valuables at home.   Do Not wear any jewelry or watches, including any metal in body piercings. Jewelry must be removed prior to coming to the hospital.  There is a possibility that rings that are unable to be removed may be cut off if they are on the surgical extremity.    Please remove all hair extensions, wigs, clips and any other metal accessories/ ornaments from your hair.  These items may pose a flammable/fire risk in Surgery and must be removed.    Do not shave your surgical area at least 5 days prior to your surgery. The surgical prep will be performed at the hospital according to Infection Control regulations.    Contact Lens must be removed before surgery. Either do not wear the contact lens or bring a case and solution for storage.  Please bring a container for eyeglasses or dentures as required.  Bring any paperwork your physician has provided, such as consent forms,  history and physicals, doctor's orders, etc.   Bring comfortable clothes that are loose fitting to wear upon discharge. Take into consideration the type of surgery being performed.  Maintain your diet as advised per your physician the day prior to surgery.      Adequate rest the night before surgery is advised.   Park in the Parking lot behind the hospital or in the [facility] across the street from the parking lot. Parking is complimentary.  If you will be discharged the same day as your procedure, please arrange for a responsible adult to drive you home or to accompany you if traveling by taxi.    YOU WILL NOT BE PERMITTED TO DRIVE OR TO LEAVE THE HOSPITAL ALONE AFTER SURGERY.   If you are being discharged the same day, it is strongly recommended that you arrange for someone to remain with you for the first 24 hrs following your surgery.    The Surgeon will speak to your family/visitor after your surgery regarding the outcome of your surgery and post op care.  The Surgeon may speak to you after your surgery, but there is a possibility you may not remember the details.  Please check with your family members regarding the conversation with the Surgeon.    We strongly recommend whoever is bringing you home be present for discharge instructions.  This will ensure a thorough understanding for your post op home care.    If the patient has fever, cough, or signs/symptoms of Flu or Covid please do not come in for your surgery. Contact your surgeon and your primary care physician for further instructions.           Thank you for your cooperation.  The Staff of [facility].            Bathing Instructions with Hibiclens    Shower the evening before and morning of your procedure with Chlorhexidine (Hibiclens)  do not use Chlorhexidine on your face or genitals. Do not get in your eyes.  Wash your face with water and your regular face wash/soap  Use your regular shampoo  Apply Chlorhexidine (Hibiclens) directly on your skin or on a wet washcloth and wash gently. When showering: Move away from the shower stream when applying Chlorhexidine (Hibiclens) to avoid rinsing off too soon.  Rinse thoroughly with warm water  Do not dilute Chlorhexidine (Hibiclens)   Dry off as usual, do not use any deodorant, powder, body lotions, perfume, after shave or cologne.

## 2024-08-19 ENCOUNTER — CLINICAL SUPPORT (OUTPATIENT)
Dept: REHABILITATION | Facility: HOSPITAL | Age: 49
End: 2024-08-19
Payer: OTHER GOVERNMENT

## 2024-08-19 DIAGNOSIS — M79.641 HAND PAIN, RIGHT: Primary | ICD-10-CM

## 2024-08-19 DIAGNOSIS — M25.60 RANGE OF MOTION DEFICIT: ICD-10-CM

## 2024-08-19 DIAGNOSIS — M25.441 SWELLING OF HAND JOINT, RIGHT: ICD-10-CM

## 2024-08-19 PROCEDURE — 97140 MANUAL THERAPY 1/> REGIONS: CPT

## 2024-08-19 PROCEDURE — 97530 THERAPEUTIC ACTIVITIES: CPT

## 2024-08-19 PROCEDURE — 97018 PARAFFIN BATH THERAPY: CPT

## 2024-08-19 NOTE — PROGRESS NOTES
[facility]  Occupational Therapy Treatment Note    Date: 8/19/2024  Name: Jethro Uriarte III  Clinic Number: [id number]    Therapy Diagnosis: R MP capsulotomies/arthroplasties, PIP fusions 6/26/2024       Encounter Diagnoses   Name Primary?    Hand pain, right Yes    Range of motion deficit      Swelling of hand joint, right           Medical Diagnosis: Right MP capsulotomies and arthroplasties to all digits; PIP joint fusions digits 2, 3, 5; boutonierre deformity correction thumb, right      ICD-10:   Z98.890 (ICD-10-CM) - Post-operative state   M24.549 (ICD-10-CM) - Contracture of joint of finger, unspecified laterality         Physician: Mahesh Alcala PA-C; Dr. SUMAN Palmer   Physician Orders: Eval and Treat     Surgical Procedure and Date: 6/26/24   RIGHT 2ND, 3RD, 4TH AND 5TH MCPJ SOFT TISSUE (WITH CAPSULOTOMIES) AND BALANCING WITH ARTHTOPLASTIES (Right)  INDEX, LONG, AND SMALL FINGER PIP VOLAR PLATE RELEAE AND FUSION WITH CAPSULOTOMY (Right)  BOUTIENIERE DEFORMITY OF THUMB CORRECTION (Right)  CLOSED REDUCTION, PIP JOINT, ARTICULAR, RING FINGER     Evaluation Date: 7/10/2024     Plan of Care Certification Period: 10/10/24     Date of Return to MD: 8/7/24     Visit # / Visits authorized: 11 / 14  Insurance Authorization Period Expiration: 10/13/24     FOTO #1: FSM___nt__ / GOAL ___nt__     Precautions:  Standard and Weightbearing; physical disabilities      Time In: 1:45 PM  Time Out: 2:30 PM  Total Appointment Time (timed & untimed codes): 45 minutes    SUBJECTIVE     Pt reports: He has not been contacted for an exact time of his procedure to remove a pin from his effected ring finger, however, he has heard from anesthesia.     He was compliant with home exercise program given last session.   Response to previous treatment:slightly less pain   Functional change: none     Pain: 5/10  Location: right fingers dorsal, sore, tender, hurts       OBJECTIVE     Objective Measures updated at  progress report unless specified.    Observation: Mr Uriarte is a 48-year-old male who has polymyositis almost a scleroderma picture with contractures involving both hands left greater than right.  He had almost  disuse of his hands bilaterally he requires constant aid and  help;  he has also wheelchair-bound     Edema. Measured in centimeters.    7/10/2024         Proximal Wrist Crease 14 cm    Proximal Palmar Crease      MCPs 17 cm       Edema. Measured in centimeters. - not assessed due to stitch removal and pain this date.  Moderate digit edema noted especially in small finger.      Hand ROM. Measured in degrees.  PIPs fused       7/10/2024               Index: MP  54/NT              PIP     Fused 25* flex              DIP 27/27              GONZALEZ           Long:  MP 50/NT              PIP Fused 25* flex              DIP 30/30              GONZALEZ           Ring:   MP 55/NT              PIP Partially fused 30*              DIP 40/40              GONZALEZ           Small:  MP 40/NT               PIP Fused 25*               DIP 45/45              GONZALEZ           Thumb: MP 55/62                IP 0/0       Rad ADD/ABD nt       Pal ADD/ABD nt          Opposition nt   NO Significant AROM of IP's, MP flexion not tested actively due to post op precautions; passively able to extend MPs to 20-30*   10-15 degree ulnar drift noted; correctable with PROM      Wrist ext/flex 18 / 24   Sup/pro 50 / wfl   No significant RD/UD      Sensation- intact          Strength (Dyanmometer) and Pinch Strength (Pinch Gauge)  Measured in pounds and psi. Average of three trials.    7/10/2024 7/10/2024     Right  Left    Rung II NT NT   Key Pinch NT NT   3pt Pinch NT NT   2pt Pinch NT NT      Treatment     Jethro received the treatments listed below:     Supervised modalities  for 10 min after being cleared for contradictions: Paraffin bath -  poured paraffin into bag and placed on top of hand as it was too hot to don; wrapped in towel  to maintain heat  "application to decrease joint stiffness and improve flexibilityand placed on therabar to maintain MP's in extension; monitored for pain/redness.      Therapeutic Activities to improve functional performance for 25 minutes, including:  - attempted holding game controller with good use of thumb for toggle and button controls; attempted holding phone with pop socket but slightly difficult to manipulate; reports he does use R thumb to occasionally text (NT)  -Gentle composite active MP flexion and passive MP extension with   - Place and hold for MP extension x 3 sec x 10 reps  - place and hold for individual digit extension x 3 sec x 10 reps each   - Active thumb flexion with passive abduction/extension , 3 sec hold, repeat  - digit ab/ad as able  -  dice and pom poms with index-thumb opposition with no difficulty (NT)  - removed 10 grooved pegs from pegboard with no difficulty (NT)  - added yellow clothespin for key pinch x 10-15 reps with coban for easier grasp.   - able to grossly grasp 1.5 and 2" PVC pipe for gross  with supination to neutral to simulate holding game controller (NT)  - added yellow putty in clinic for thumb adduction x 10 reps   - added light red rubberband for thumb abduction in limited range x 10 reps   - education on surgical scheduling with consultation with MA for approximated time for a procedure on 8/21/2024     Manual therapy techniques: for 10 min (including Joint mobilizations, Myofacial release, Manual Lymphatic Drainage, Soft tissue Mobilization, and Friction Massage)  were applied to the right hand  as follows:   - scar massage with mini massager and by hand   - STM to incision areas  -- Gentle passive stretch to thumb to increase MP extension/abduction   - added gentle passive supination and wrist flex/ext  to improve wrist rotation and flexibility to hold game controller     Patient Education and Home Exercises      Education provided:   - Cont HEP   - Per above   - " Progress towards goals     Written Home Exercises Provided: Patient instructed to cont prior HEP.  Exercises were reviewed and Jethro was able to demonstrate them prior to the end of the session.  Jethro demonstrated good  understanding of the HEP provided. See EMR under Patient Instructions for exercises provided during therapy sessions.       Assessment     Jethro is progressing well towards his goals and there are no updates to goals at this time. Pt prognosis is Fair to Good. MP active extension remains limited with the pin for digit 4 requiring removal this week. Progressing with thumb flexion/abduction with noted improvement in resistive exercises for the thumb. Opposition index-thumb improving for functional light activities; Will progress as able. Therapist to re-assess objective measurements following pin removal on 08/21/2024.     Pt will continue to benefit from skilled outpatient occupational therapy to address the deficits listed in the problem list on initial evaluation provide pt/family education and to maximize pt's level of independence in the home and community environment.     Pt's spiritual, cultural and educational needs considered and pt agreeable to plan of care and goals.    Anticipated barriers to occupational therapy: none     The following goals were discussed with the patient and patient is in agreement with them as to be addressed in the treatment plan.      Short Term Goals: (6 weeks):  1)  Patient to be IND with HEP and modalities for pain management  2)  Increase ROM 3-10 degrees to increase functional hand use for ADLs/work/leisure activities  3)   Decrease edema .1-.5mm to increase joint mobility /flexibility for functional hand use.      Long Term Goals :To be met by discharge (12 weeks)   1) Patient to be able to grasp game controller and operate thumb control by discharge   2)   Increase ROM 11-20 degrees to increase functional hand use for ADLs/work/leisure activities  3)  Patient  to report improved functional use of hand > 40% as compared to prior to surgery.   Plan   Certification Period/Plan of care expiration: 7/10/2024 to 10/10/2024.    KAILEY Martinez/L

## 2024-08-20 ENCOUNTER — TELEPHONE (OUTPATIENT)
Dept: ORTHOPEDICS | Facility: CLINIC | Age: 49
End: 2024-08-20
Payer: OTHER GOVERNMENT

## 2024-08-20 PROBLEM — T84.84XA PAINFUL ORTHOPAEDIC HARDWARE: Status: ACTIVE | Noted: 2024-08-20

## 2024-08-20 RX ORDER — OXYCODONE AND ACETAMINOPHEN 5; 325 MG/1; MG/1
1 TABLET ORAL
Qty: 10 TABLET | Refills: 0 | Status: SHIPPED | OUTPATIENT
Start: 2024-08-20

## 2024-08-20 RX ORDER — ACETAMINOPHEN 500 MG
1000 TABLET ORAL 2 TIMES DAILY PRN
Qty: 50 TABLET | Refills: 0 | Status: SHIPPED | OUTPATIENT
Start: 2024-08-20

## 2024-08-20 RX ORDER — IBUPROFEN 600 MG/1
600 TABLET ORAL 3 TIMES DAILY PRN
Qty: 45 TABLET | Refills: 0 | Status: SHIPPED | OUTPATIENT
Start: 2024-08-20

## 2024-08-20 NOTE — TELEPHONE ENCOUNTER
LVM to Inform pt of 8:00 a.m. arrival time for 08/21/24  surgery at the [facility]. Reminded pt of NPO status. Requested a call back to the [employer] at [phone] with any questions and to confirm.   My O message sent

## 2024-08-21 ENCOUNTER — HOSPITAL ENCOUNTER (OUTPATIENT)
Facility: OTHER | Age: 49
Discharge: HOME | End: 2024-08-21
Attending: ORTHOPAEDIC SURGERY | Admitting: ORTHOPAEDIC SURGERY
Payer: OTHER GOVERNMENT

## 2024-08-21 ENCOUNTER — ANESTHESIA (OUTPATIENT)
Dept: SURGERY | Facility: OTHER | Age: 49
End: 2024-08-21
Payer: OTHER GOVERNMENT

## 2024-08-21 DIAGNOSIS — T84.84XA PAINFUL ORTHOPAEDIC HARDWARE: Primary | ICD-10-CM

## 2024-08-21 PROCEDURE — 71000015 HC POSTOP RECOV 1ST HR: Performed by: ORTHOPAEDIC SURGERY

## 2024-08-21 PROCEDURE — 37000009 HC ANESTHESIA EA ADD 15 MINS: Performed by: ORTHOPAEDIC SURGERY

## 2024-08-21 PROCEDURE — 63600175 PHARM REV CODE 636 W HCPCS: Mod: JZ,JG | Performed by: ORTHOPAEDIC SURGERY

## 2024-08-21 PROCEDURE — 25000003 PHARM REV CODE 250: Performed by: NURSE ANESTHETIST, CERTIFIED REGISTERED

## 2024-08-21 PROCEDURE — 36000707: Performed by: ORTHOPAEDIC SURGERY

## 2024-08-21 PROCEDURE — 63600175 PHARM REV CODE 636 W HCPCS: Performed by: STUDENT IN AN ORGANIZED HEALTH CARE EDUCATION/TRAINING PROGRAM

## 2024-08-21 PROCEDURE — 37000008 HC ANESTHESIA 1ST 15 MINUTES: Performed by: ORTHOPAEDIC SURGERY

## 2024-08-21 PROCEDURE — 20670 REMOVAL IMPLANT SUPERFICIAL: CPT | Mod: 78,,, | Performed by: ORTHOPAEDIC SURGERY

## 2024-08-21 PROCEDURE — 63600175 PHARM REV CODE 636 W HCPCS: Performed by: ANESTHESIOLOGY

## 2024-08-21 PROCEDURE — 71000016 HC POSTOP RECOV ADDL HR: Performed by: ORTHOPAEDIC SURGERY

## 2024-08-21 PROCEDURE — 36000706: Performed by: ORTHOPAEDIC SURGERY

## 2024-08-21 PROCEDURE — 63600175 PHARM REV CODE 636 W HCPCS: Performed by: NURSE ANESTHETIST, CERTIFIED REGISTERED

## 2024-08-21 PROCEDURE — 25000003 PHARM REV CODE 250: Performed by: ORTHOPAEDIC SURGERY

## 2024-08-21 PROCEDURE — 25000003 PHARM REV CODE 250: Performed by: STUDENT IN AN ORGANIZED HEALTH CARE EDUCATION/TRAINING PROGRAM

## 2024-08-21 RX ORDER — PROPOFOL 10 MG/ML
VIAL (ML) INTRAVENOUS CONTINUOUS PRN
Status: DISCONTINUED | OUTPATIENT
Start: 2024-08-21 | End: 2024-08-21

## 2024-08-21 RX ORDER — MUPIROCIN 20 MG/G
OINTMENT TOPICAL
Status: DISCONTINUED | OUTPATIENT
Start: 2024-08-21 | End: 2024-08-21 | Stop reason: HOSPADM

## 2024-08-21 RX ORDER — HYDROMORPHONE HYDROCHLORIDE 2 MG/ML
0.4 INJECTION, SOLUTION INTRAMUSCULAR; INTRAVENOUS; SUBCUTANEOUS EVERY 5 MIN PRN
Status: DISCONTINUED | OUTPATIENT
Start: 2024-08-21 | End: 2024-08-21 | Stop reason: HOSPADM

## 2024-08-21 RX ORDER — SODIUM CHLORIDE 0.9 % (FLUSH) 0.9 %
3 SYRINGE (ML) INJECTION
Status: DISCONTINUED | OUTPATIENT
Start: 2024-08-21 | End: 2024-08-21 | Stop reason: HOSPADM

## 2024-08-21 RX ORDER — ONDANSETRON HYDROCHLORIDE 2 MG/ML
4 INJECTION, SOLUTION INTRAVENOUS DAILY PRN
Status: DISCONTINUED | OUTPATIENT
Start: 2024-08-21 | End: 2024-08-21 | Stop reason: HOSPADM

## 2024-08-21 RX ORDER — OXYCODONE HYDROCHLORIDE 5 MG/1
5 TABLET ORAL
Status: DISCONTINUED | OUTPATIENT
Start: 2024-08-21 | End: 2024-08-21 | Stop reason: HOSPADM

## 2024-08-21 RX ORDER — PROPOFOL 10 MG/ML
VIAL (ML) INTRAVENOUS
Status: DISCONTINUED | OUTPATIENT
Start: 2024-08-21 | End: 2024-08-21

## 2024-08-21 RX ORDER — CALCIUM CARBONATE 500(1250)
1250 TABLET ORAL
COMMUNITY
Start: 2024-08-12

## 2024-08-21 RX ORDER — CHOLECALCIFEROL (VITAMIN D3) 25 MCG
25 TABLET ORAL
COMMUNITY
Start: 2024-08-12

## 2024-08-21 RX ORDER — GLUCAGON 1 MG
1 KIT INJECTION
Status: DISCONTINUED | OUTPATIENT
Start: 2024-08-21 | End: 2024-08-21 | Stop reason: HOSPADM

## 2024-08-21 RX ORDER — BUPIVACAINE HYDROCHLORIDE 2.5 MG/ML
INJECTION, SOLUTION EPIDURAL; INFILTRATION; INTRACAUDAL
Status: DISCONTINUED | OUTPATIENT
Start: 2024-08-21 | End: 2024-08-21 | Stop reason: HOSPADM

## 2024-08-21 RX ORDER — LIDOCAINE HYDROCHLORIDE 20 MG/ML
INJECTION INTRAVENOUS
Status: DISCONTINUED | OUTPATIENT
Start: 2024-08-21 | End: 2024-08-21

## 2024-08-21 RX ORDER — LIDOCAINE HYDROCHLORIDE 10 MG/ML
INJECTION, SOLUTION EPIDURAL; INFILTRATION; INTRACAUDAL; PERINEURAL
Status: DISCONTINUED | OUTPATIENT
Start: 2024-08-21 | End: 2024-08-21 | Stop reason: HOSPADM

## 2024-08-21 RX ORDER — MEPERIDINE HYDROCHLORIDE 25 MG/ML
12.5 INJECTION INTRAMUSCULAR; INTRAVENOUS; SUBCUTANEOUS ONCE AS NEEDED
Status: DISCONTINUED | OUTPATIENT
Start: 2024-08-21 | End: 2024-08-21 | Stop reason: HOSPADM

## 2024-08-21 RX ADMIN — PROPOFOL 80 MG: 10 INJECTION, EMULSION INTRAVENOUS at 09:08

## 2024-08-21 RX ADMIN — LIDOCAINE HYDROCHLORIDE 50 MG: 20 INJECTION, SOLUTION INTRAVENOUS at 09:08

## 2024-08-21 RX ADMIN — PROPOFOL 100 MCG/KG/MIN: 10 INJECTION, EMULSION INTRAVENOUS at 10:08

## 2024-08-21 RX ADMIN — CEFAZOLIN 2 G: 2 INJECTION, POWDER, FOR SOLUTION INTRAMUSCULAR; INTRAVENOUS at 09:08

## 2024-08-21 RX ADMIN — SODIUM CHLORIDE, SODIUM LACTATE, POTASSIUM CHLORIDE, AND CALCIUM CHLORIDE: .6; .31; .03; .02 INJECTION, SOLUTION INTRAVENOUS at 09:08

## 2024-08-21 NOTE — INTERVAL H&P NOTE
The patient has been examined and the H&P has been reviewed:    I concur with the findings and no changes have occurred since H&P was written.    Surgery risks, benefits and alternative options discussed and understood by patient/family.          Active Hospital Problems    Diagnosis  POA    *Painful orthopaedic hardware [T84.84XA]  Yes      Resolved Hospital Problems   No resolved problems to display.

## 2024-08-21 NOTE — PLAN OF CARE
Jethro Uriarte III has met all discharge criteria from Phase II. Vital Signs are stable, ambulating  without difficulty. Discharge instructions given, patient verbalized understanding. Patient ready for discharge, awaiting arrival of discharge medications from pharmacy bedside delivery.

## 2024-08-21 NOTE — BRIEF OP NOTE
[facility] ([place])  Brief Operative Note    Surgery Date: 8/21/2024     Surgeons and Role:     * Rhoda Lucas MD - Primary    Assisting Surgeon: None    Pre-op Diagnosis:  Pain [R52]  Painful orthopaedic hardware [T84.84XA]    Post-op Diagnosis:  Post-Op Diagnosis Codes:     * Pain [R52]     * Painful orthopaedic hardware [T84.84XA]    Procedure(s) (LRB):  RIGHT RING PIN REMOVAL (Right)    Anesthesia: Local MAC    Operative Findings: Removal of pin from right ring PIP joint     Estimated Blood Loss <2cc         Specimens:   Specimen (24h ago, onward)      None              Discharge Note    OUTCOME: Patient tolerated treatment/procedure well without complication and is now ready for discharge.    DISPOSITION: Home or Self Care    FINAL DIAGNOSIS:  Painful orthopaedic hardware    FOLLOWUP: In clinic    DISCHARGE INSTRUCTIONS:    Discharge Procedure Orders   Diet general     Keep surgical extremity elevated     Ice to affected area   Order Comments: using barrier between ice and skin (specify duration&frequency)     Leave dressing on - Keep it clean, dry, and intact until clinic visit   Order Comments: If dressing becomes wet, dirty or irritated, please call the clinic for a dressing change.     Call MD for:  temperature >100.4     Call MD for:  persistent nausea and vomiting     Call MD for:  severe uncontrolled pain     Call MD for:  difficulty breathing, headache or visual disturbances     Call MD for:  redness, tenderness, or signs of infection (pain, swelling, redness, odor or green/yellow discharge around incision site)     Call MD for:  hives     Call MD for:  persistent dizziness or light-headedness     Call MD for:  extreme fatigue

## 2024-08-21 NOTE — OP NOTE
OPERATIVE NOTE    DATE OF PROCEDURE:  8/21/2024     PREOPERATIVE DIAGNOSIS:   Pain [R52]  Painful orthopaedic hardware [T84.84XA]    POSTOPERATIVE DIAGNOSIS:   Post-Op Diagnosis Codes:     * Pain [R52]     * Painful orthopaedic hardware [T84.84XA]    PROCEDURE:   Procedure(s) (LRB):  RIGHT RING FINGER PIN REMOVAL (Right)       Surgeons and Role:     * Rhoda Lucas MD - Primary    Assisting Surgeon: Declan Lindsey, (res)    ANESTHESIA:   Local MAC    EBL:    2cc    COMPLICATIONS:  none    IMPLANTS:   None    Procedure(s):  RIGHT RING PIN REMOVAL     INDICATIONS FOR PROCEDURE:  Patient is 6 weeks status post right 2nd, 3rd, 4th, and 5th MCPJ Arthroplasties, R Index, Long and Small Finger PIP Volar Plate Releasae and Fusion with capsulotomy, Boutieniere Deformity of Thumb Correction and Closed Reduction PIPJ Fusion of Ring Finger reports that he is in minimal pain. He is here today for planned pin removal from the right ring finger PIP joint.       OPERATIVE PROCEDURE:  Patient met in the preoperative hold area and the correct site and side of surgery being the right hand were marked and verified.  Patient brought back to the operative suite.  MAC anesthesia smoothly induced, 5cc 1% lidocaine without epinephrine injected for a digital block of the right ring finger. Patient transferred over to operative table.  Placed in supine position. All bony prominences were appropriately padded.  Patient received ancef for preoperative antibiotics.  The right hand was then prepped and draped in normal sterile fashion.    Time-out was performed verifying the correct patient, site/side of surgery, surgical consent, radiographs as applicable, preop antibiotics, necessary equipment, anticipated blood loss, length of procedure, postoperative disposition. Tourniquet was placed but not inflated.     The proximal end of the pin was palpable over the dorsal proximal phalanx of the right finger and this was confirmed on  fluoroscopy. 0.5cm incision was marked and made with a 15 blade over this portion of the pin, Dissection was carried down through subcutaneous tissue with a hemostat.  Once the proximal aspect of the pin was visualized this was grabbed with the hemostat and removed without difficulty the pin was intact.  Fluoroscopy was then used around the the pin had been removed completely.    The wound was then irrigated with normal saline.  Skin was closed with 4-0 nylon suture in a horizontal mattress fashion.  The patient was placed in a soft dressing.    At the conclusion of procedure the patient had soft and compressible compartments, brisk cap refill, palpable radial pulse in the operative extremity.    Prior to final closure all counts were confirmed to be correct.  Patient tolerated the procedure well without any complications, was awoken from anesthesia, transferred PACU for further recovery.    POSTOPERATIVE PLAN:  He should keep the dressings in place until his clinic visit in 2 weeks at which time the suture will be removed.

## 2024-08-21 NOTE — ANESTHESIA POSTPROCEDURE EVALUATION
Anesthesia Post Evaluation    Patient: Jethro Ebbs III    Procedure(s) Performed: Procedure(s) (LRB):  RIGHT RING PIN REMOVAL (Right)    Final Anesthesia Type: MAC      Patient location during evaluation: OPS  Patient participation: Yes- Able to Participate  Level of consciousness: awake  Post-procedure vital signs: reviewed and stable  Pain management: adequate  Airway patency: patent    PONV status at discharge: No PONV  Anesthetic complications: no      Cardiovascular status: blood pressure returned to baseline and hemodynamically stable  Respiratory status: spontaneous ventilation, room air and unassisted  Hydration status: euvolemic  Follow-up not needed.              Vitals Value Taken Time   /51 08/21/24 1030   Temp 97 08/21/24 1030   Pulse 102 08/21/24 1030   Resp 18 08/21/24 1030   SpO2 99% 08/21/24 1030         No case tracking events are documented in the log.      Pain/Tu Score: No data recorded

## 2024-08-22 VITALS
HEIGHT: 68 IN | TEMPERATURE: 98 F | SYSTOLIC BLOOD PRESSURE: 98 MMHG | BODY MASS INDEX: 19.7 KG/M2 | DIASTOLIC BLOOD PRESSURE: 60 MMHG | RESPIRATION RATE: 16 BRPM | WEIGHT: 130 LBS | OXYGEN SATURATION: 95 % | HEART RATE: 96 BPM

## 2024-08-23 ENCOUNTER — PATIENT MESSAGE (OUTPATIENT)
Dept: ORTHOPEDICS | Facility: CLINIC | Age: 49
End: 2024-08-23
Payer: OTHER GOVERNMENT

## 2024-08-26 ENCOUNTER — CLINICAL SUPPORT (OUTPATIENT)
Dept: REHABILITATION | Facility: HOSPITAL | Age: 49
End: 2024-08-26
Payer: OTHER GOVERNMENT

## 2024-08-26 DIAGNOSIS — Z98.890 POST-OPERATIVE STATE: Primary | ICD-10-CM

## 2024-08-26 DIAGNOSIS — M25.441 SWELLING OF HAND JOINT, RIGHT: ICD-10-CM

## 2024-08-26 DIAGNOSIS — M79.641 HAND PAIN, RIGHT: Primary | ICD-10-CM

## 2024-08-26 DIAGNOSIS — M24.549 CONTRACTURE OF JOINT OF FINGER, UNSPECIFIED LATERALITY: ICD-10-CM

## 2024-08-26 DIAGNOSIS — M25.60 RANGE OF MOTION DEFICIT: ICD-10-CM

## 2024-08-26 PROCEDURE — 97018 PARAFFIN BATH THERAPY: CPT

## 2024-08-26 PROCEDURE — 97140 MANUAL THERAPY 1/> REGIONS: CPT

## 2024-08-26 PROCEDURE — 97530 THERAPEUTIC ACTIVITIES: CPT

## 2024-08-26 NOTE — PROGRESS NOTES
"[facility]  Occupational Therapy Treatment & Progress Note    Date: 8/26/2024  Name: Jethro Uriarte III  Clinic Number: [id number]    Therapy Diagnosis: R MP capsulotomies/arthroplasties, PIP fusions 6/26/2024       Encounter Diagnoses   Name Primary?    Hand pain, right Yes    Range of motion deficit      Swelling of hand joint, right           Medical Diagnosis: Right MP capsulotomies and arthroplasties to all digits; PIP joint fusions digits 2, 3, 5; boutonierre deformity correction thumb, right      ICD-10:   Z98.890 (ICD-10-CM) - Post-operative state   M24.549 (ICD-10-CM) - Contracture of joint of finger, unspecified laterality         Physician: Mahesh Alcala PA-C; Dr. SUMAN Palmer   Physician Orders: Eval and Treat    "POSTOPERATIVE PLAN:  He should keep the dressings in place until his clinic visit in 2 weeks at which time the suture will be removed." - Referring MD surgical report from 08/21/2024     Surgical Procedure and Date: 6/26/24   RIGHT 2ND, 3RD, 4TH AND 5TH MCPJ SOFT TISSUE (WITH CAPSULOTOMIES) AND BALANCING WITH ARTHTOPLASTIES (Right)  INDEX, LONG, AND SMALL FINGER PIP VOLAR PLATE RELEAE AND FUSION WITH CAPSULOTOMY (Right)  BOUTIENIERE DEFORMITY OF THUMB CORRECTION (Right)  CLOSED REDUCTION, PIP JOINT, ARTICULAR, RING FINGER     Evaluation Date: 7/10/2024     Plan of Care Certification Period: 10/10/24     Date of Return to MD: 8/7/24     Visit # / Visits authorized: 12 / 14  Insurance Authorization Period Expiration: 10/13/24     FOTO #1: FSM___nt__ / GOAL ___nt__     Precautions:  Standard and Weightbearing; physical disabilities      Time In: 11:00 AM  Time Out: 11:38 AM  Total Appointment Time (timed & untimed codes): 38 minutes    SUBJECTIVE     Pt reports: He was hoping to get his post-op dressing removed this date. Everything seems to be going smoothly since his latest procedure to remove a pin from his previous surgery.     He was compliant with home exercise program " given last session.   Response to previous treatment:slightly less pain   Functional change: none     Pain: 5/10  Location: right fingers dorsal, sore, tender, hurts       OBJECTIVE     Objective Measures updated at progress report unless specified.    Observation: Mr Uriarte is a 48-year-old male who has polymyositis almost a scleroderma picture with contractures involving both hands left greater than right.  He had almost  disuse of his hands bilaterally he requires constant aid and  help;  he has also wheelchair-bound     Edema. Measured in centimeters.    7/10/2024         Proximal Wrist Crease 14 cm    Proximal Palmar Crease      MCPs 17 cm       Edema. Measured in centimeters. - not assessed due to stitch removal and pain this date.  Moderate digit edema noted especially in small finger.      Hand ROM. Measured in degrees.  PIPs fused       7/10/2024               Index: MP  54/NT              PIP     Fused 25* flex              DIP 27/27              GONZALEZ           Long:  MP 50/NT              PIP Fused 25* flex              DIP 30/30              GONZALEZ           Ring:   MP 55/NT              PIP Partially fused 30*              DIP 40/40              GONZALEZ           Small:  MP 40/NT               PIP Fused 25*               DIP 45/45              GONZALEZ           Thumb: MP 55/62                IP 0/0       Rad ADD/ABD nt       Pal ADD/ABD nt          Opposition nt   NO Significant AROM of IP's, MP flexion not tested actively due to post op precautions; passively able to extend MPs to 20-30*   10-15 degree ulnar drift noted; correctable with PROM      Wrist ext/flex 18 / 24   Sup/pro 50 / wfl   No significant RD/UD      Sensation- intact          Strength (Dyanmometer) and Pinch Strength (Pinch Gauge)  Measured in pounds and psi. Average of three trials.    7/10/2024 7/10/2024     Right  Left    Rung II NT NT   Key Pinch NT NT   3pt Pinch NT NT   2pt Pinch NT NT      Comments: Objective measurements not re-assessed  "this date due to post-op dressing being required to remain donned on patient until suture removal on 09/06/2024. The dressing partially covers digit 3 at the MP joint and completely covers digits 4-5. The dressing further impacts the webspace.     Treatment     Jethro received the treatments listed below:     Supervised modalities  for 10 min after being cleared for contradictions: Paraffin bath -  poured paraffin into bag and placed on top of hand as it was too hot to don; wrapped in towel  to maintain heat application to decrease joint stiffness and improve flexibilityand placed on therabar to maintain MP's in extension; monitored for pain/redness.      Therapeutic Activities to improve functional performance for 18 minutes, including:    -Gentle composite active MP flexion and passive MP extension with   - Place and hold for MP extension for digits 2-3 x 3-5 sec x 10 reps  - place and hold for individual digit's 2-3 extension x 3-5 sec x 10 reps each   - Active thumb flexion with passive abduction/extension , 3 sec hold, repeat  - Passive digit ab/ad for digits 2-3 x 15 reps each  - added light red rubberband for thumb abduction in limited range x 20 reps   - education on scheduling, progress and future therapy following a surgical procedure- added yellow clothespin for key pinch x 20 reps with coban for easier grasp.   - education on positioning at home for LLPS in MP extension utilizing a towel or small ball.   -  dice and pom poms with index-thumb opposition with no difficulty (NT)  - removed 10 grooved pegs from pegboard with no difficulty (NT)  - able to grossly grasp 1.5 and 2" PVC pipe for gross  with supination to neutral to simulate holding game controller (NT)  - added yellow putty in clinic for thumb adduction x 10 reps (NT due to post-op dressing in the webspace)   - attempted holding game controller with good use of thumb for toggle and button controls; attempted holding phone with pop " socket but slightly difficult to manipulate; reports he does use R thumb to occasionally text (NT)       Manual therapy techniques: for 10 min (including Joint mobilizations, Myofacial release, Manual Lymphatic Drainage, Soft tissue Mobilization, and Friction Massage)  were applied to the right hand  as follows:   - scar massage with mini massager and by hand   - STM to incision areas and finger flexors for digits 1-3  -- Gentle passive stretch to thumb to increase MP extension/abduction   - added gentle passive supination and wrist flex/ext  to improve wrist rotation and flexibility to hold game controller (NT due to post-op dressing)    Patient Education and Home Exercises      Education provided:   - Cont HEP   - Per above   - Progress towards goals     Written Home Exercises Provided: Patient instructed to cont prior HEP.  Exercises were reviewed and Jethro was able to demonstrate them prior to the end of the session.  Jethro demonstrated good  understanding of the HEP provided. See EMR under Patient Instructions for exercises provided during therapy sessions.       Assessment     Jethro is progressing well towards his goals and there are no updates to goals at this time. Pt prognosis is Fair to Good. MP active extension remains limited with the pin for digit 4 requiring removal this week. Progressing with thumb flexion/abduction with noted improvement in resistive exercises for the thumb. Opposition index-thumb improving for functional light activities; Will progress as able. Therapist to re-assess objective measurements following pin removal  and post-op dressing from 08/21/2024, which is scheduled for 09/06/2024, per referring MD orders. Patient requires further visits due to an additional surgical intervention required for improved pain levels and performance of activities of daily living.     Pt will continue to benefit from skilled outpatient occupational therapy to address the deficits listed in the problem  list on initial evaluation provide pt/family education and to maximize pt's level of independence in the home and community environment.     Pt's spiritual, cultural and educational needs considered and pt agreeable to plan of care and goals.    Anticipated barriers to occupational therapy: none     The following goals were discussed with the patient and patient is in agreement with them as to be addressed in the treatment plan.      Short Term Goals: (6 weeks):  1)  Patient to be IND with HEP and modalities for pain management  2)  Increase ROM 3-10 degrees to increase functional hand use for ADLs/work/leisure activities  3)   Decrease edema .1-.5mm to increase joint mobility /flexibility for functional hand use.      Long Term Goals :To be met by discharge (12 weeks)   1) Patient to be able to grasp game controller and operate thumb control by discharge   2)   Increase ROM 11-20 degrees to increase functional hand use for ADLs/work/leisure activities  3)  Patient to report improved functional use of hand > 40% as compared to prior to surgery.   Plan   Certification Period/Plan of care expiration: 7/10/2024 to 10/10/2024.    Patel Serrato OTR/L

## 2024-08-28 ENCOUNTER — CLINICAL SUPPORT (OUTPATIENT)
Dept: REHABILITATION | Facility: HOSPITAL | Age: 49
End: 2024-08-28
Payer: OTHER GOVERNMENT

## 2024-08-28 DIAGNOSIS — M25.60 RANGE OF MOTION DEFICIT: ICD-10-CM

## 2024-08-28 DIAGNOSIS — M25.441 SWELLING OF HAND JOINT, RIGHT: ICD-10-CM

## 2024-08-28 DIAGNOSIS — M79.641 HAND PAIN, RIGHT: Primary | ICD-10-CM

## 2024-08-28 PROCEDURE — 97530 THERAPEUTIC ACTIVITIES: CPT

## 2024-08-28 PROCEDURE — 97018 PARAFFIN BATH THERAPY: CPT

## 2024-08-28 PROCEDURE — 97140 MANUAL THERAPY 1/> REGIONS: CPT

## 2024-08-29 NOTE — PROGRESS NOTES
"[facility]  Occupational Therapy Treatment & Progress Note    Date: 8/28/2024  Name: Jethro Uriarte III  Clinic Number: [id number]    Therapy Diagnosis: R MP capsulotomies/arthroplasties, PIP fusions 6/26/2024       Encounter Diagnoses   Name Primary?    Hand pain, right Yes    Range of motion deficit      Swelling of hand joint, right           Medical Diagnosis: Right MP capsulotomies and arthroplasties to all digits; PIP joint fusions digits 2, 3, 5; boutonierre deformity correction thumb, right      ICD-10:   Z98.890 (ICD-10-CM) - Post-operative state   M24.549 (ICD-10-CM) - Contracture of joint of finger, unspecified laterality         Physician: Mahesh Alcala PA-C; Dr. SUMAN Palmer   Physician Orders: Eval and Treat    "POSTOPERATIVE PLAN:  He should keep the dressings in place until his clinic visit in 2 weeks at which time the suture will be removed." - Referring MD surgical report from 08/21/2024     Surgical Procedure and Date: 6/26/24   RIGHT 2ND, 3RD, 4TH AND 5TH MCPJ SOFT TISSUE (WITH CAPSULOTOMIES) AND BALANCING WITH ARTHTOPLASTIES (Right)  INDEX, LONG, AND SMALL FINGER PIP VOLAR PLATE RELEAE AND FUSION WITH CAPSULOTOMY (Right)  BOUTIENIERE DEFORMITY OF THUMB CORRECTION (Right)  CLOSED REDUCTION, PIP JOINT, ARTICULAR, RING FINGER     Evaluation Date: 7/10/2024     Plan of Care Certification Period: 10/10/24     Date of Return to MD: 8/7/24     Visit # / Visits authorized: 12 / 14  Insurance Authorization Period Expiration: 10/13/24     FOTO #1: FSM___nt__ / GOAL ___nt__     Precautions:  Standard and Weightbearing; physical disabilities      Time In: 11:00 AM  Time Out: 11:38 AM  Total Appointment Time (timed & untimed codes): 38 minutes    SUBJECTIVE     Pt reports: He was hoping to get his post-op dressing removed this date. Everything seems to be going smoothly since his latest procedure to remove a pin from his previous surgery.     He was compliant with home exercise program " given last session.   Response to previous treatment:slightly less pain   Functional change: none     Pain: 5/10  Location: right fingers dorsal, sore, tender, hurts       OBJECTIVE     Objective Measures updated at progress report unless specified.    Observation: Mr Uriarte is a 48-year-old male who has polymyositis almost a scleroderma picture with contractures involving both hands left greater than right.  He had almost  disuse of his hands bilaterally he requires constant aid and  help;  he has also wheelchair-bound     Edema. Measured in centimeters.    7/10/2024         Proximal Wrist Crease 14 cm    Proximal Palmar Crease      MCPs 17 cm       Edema. Measured in centimeters. - not assessed due to stitch removal and pain this date.  Moderate digit edema noted especially in small finger.      Hand ROM. Measured in degrees.  PIPs fused       7/10/2024               Index: MP  54/NT              PIP     Fused 25* flex              DIP 27/27              GONZALEZ           Long:  MP 50/NT              PIP Fused 25* flex              DIP 30/30              GONZALEZ           Ring:   MP 55/NT              PIP Partially fused 30*              DIP 40/40              GONZALEZ           Small:  MP 40/NT               PIP Fused 25*               DIP 45/45              GONZALEZ           Thumb: MP 55/62                IP 0/0       Rad ADD/ABD nt       Pal ADD/ABD nt          Opposition nt   NO Significant AROM of IP's, MP flexion not tested actively due to post op precautions; passively able to extend MPs to 20-30*   10-15 degree ulnar drift noted; correctable with PROM      Wrist ext/flex 18 / 24   Sup/pro 50 / wfl   No significant RD/UD      Sensation- intact          Strength (Dyanmometer) and Pinch Strength (Pinch Gauge)  Measured in pounds and psi. Average of three trials.    7/10/2024 7/10/2024     Right  Left    Rung II NT NT   Key Pinch NT NT   3pt Pinch NT NT   2pt Pinch NT NT      Comments: Objective measurements not re-assessed  "this date due to post-op dressing being required to remain donned on patient until suture removal on 09/06/2024. The dressing partially covers digit 3 at the MP joint and completely covers digits 4-5. The dressing further impacts the webspace.     Treatment     Jethro received the treatments listed below:     Supervised modalities  for 10 min after being cleared for contradictions: Paraffin bath -  poured paraffin into bag and placed on top of hand as it was too hot to don; wrapped in towel  to maintain heat application to decrease joint stiffness and improve flexibilityand placed on therabar to maintain MP's in extension; monitored for pain/redness.      Therapeutic Activities to improve functional performance for 18 minutes, including:    - wound check following surgical procedure  - post-op dressing removed and re-dressed, per referring PA instruction  - Gentle composite active MP flexion and passive MP extension with   - Place and hold for MP extension for digits 2-3 x 3-5 sec x 10 reps  - place and hold for individual digit's 2-3 extension x 3-5 sec x 10 reps each   - Active thumb flexion with passive abduction/extension , 3 sec hold, repeat  - Passive digit ab/ad for digits 2-3 x 15 reps each (NT)  - added light red rubberband for thumb abduction in limited range x 20 reps   - yellow clothespin for key pinch x 20 reps with coban for easier grasp.   - education on positioning at home for LLPS in MP extension utilizing a towel or small ball.   -  dice and pom poms with index-thumb opposition with no difficulty (NT)  - removed 10 grooved pegs from pegboard with no difficulty (NT)  - able to grossly grasp 1.5 and 2" PVC pipe for gross  with supination to neutral to simulate holding game controller (NT)  - added yellow putty in clinic for thumb adduction x 10 reps (NT due to post-op dressing in the webspace)   - attempted holding game controller with good use of thumb for toggle and button controls; " attempted holding phone with pop socket but slightly difficult to manipulate; reports he does use R thumb to occasionally text (NT)       Manual therapy techniques: for 10 min (including Joint mobilizations, Myofacial release, Manual Lymphatic Drainage, Soft tissue Mobilization, and Friction Massage)  were applied to the right hand  as follows:   - scar massage with mini massager and by hand   - STM to incision areas and finger flexors for digits 1-3  -- Gentle passive stretch to thumb to increase MP extension/abduction   - added gentle passive supination and wrist flex/ext  to improve wrist rotation and flexibility to hold game controller    Patient Education and Home Exercises      Education provided:   - Cont HEP   - Per above   - Progress towards goals   - orthotic check  - padding application for custom orthosis for finger spacers - the patient was sent home with materials this date due to labor day holiday impacting scheduling.     Written Home Exercises Provided: Patient instructed to cont prior HEP.  Exercises were reviewed and Jethro was able to demonstrate them prior to the end of the session.  Jethro demonstrated good  understanding of the HEP provided. See EMR under Patient Instructions for exercises provided during therapy sessions.       Assessment     Jethro is progressing well towards his goals and there are no updates to goals at this time. Pt prognosis is Fair to Good. MP active extension remains limited with the pin for digit 4 requiring removal this week. Progressing with thumb flexion/abduction with noted improvement in resistive exercises for the thumb. Opposition index-thumb improving for functional light activities; Will progress as able. Wound was assessed this date with the suture remaining in place and the wound looking clean plus closed.Noted flexor tightness in the volar thumb and index finger this date. Patient requires further visits due to an additional surgical intervention required for  improved pain levels and performance of activities of daily living.     Pt will continue to benefit from skilled outpatient occupational therapy to address the deficits listed in the problem list on initial evaluation provide pt/family education and to maximize pt's level of independence in the home and community environment.     Pt's spiritual, cultural and educational needs considered and pt agreeable to plan of care and goals.    Anticipated barriers to occupational therapy: none     The following goals were discussed with the patient and patient is in agreement with them as to be addressed in the treatment plan.      Short Term Goals: (6 weeks):  1)  Patient to be IND with HEP and modalities for pain management. MET  2)  Increase ROM 3-10 degrees to increase functional hand use for ADLs/work/leisure activities  3)   Decrease edema .1-.5mm to increase joint mobility /flexibility for functional hand use.      Long Term Goals :To be met by discharge (12 weeks)   1) Patient to be able to grasp game controller and operate thumb control by discharge.   2)   Increase ROM 11-20 degrees to increase functional hand use for ADLs/work/leisure activities  3)  Patient to report improved functional use of hand > 40% as compared to prior to surgery.   Plan   Certification Period/Plan of care expiration: 7/10/2024 to 10/10/2024.    KAILEY Martinez/KENZIE

## 2024-09-05 ENCOUNTER — TELEPHONE (OUTPATIENT)
Dept: ORTHOPEDICS | Facility: CLINIC | Age: 49
End: 2024-09-05
Payer: OTHER GOVERNMENT

## 2024-09-06 ENCOUNTER — OFFICE VISIT (OUTPATIENT)
Dept: ORTHOPEDICS | Facility: CLINIC | Age: 49
End: 2024-09-06
Payer: OTHER GOVERNMENT

## 2024-09-06 ENCOUNTER — CLINICAL SUPPORT (OUTPATIENT)
Dept: REHABILITATION | Facility: HOSPITAL | Age: 49
End: 2024-09-06
Payer: OTHER GOVERNMENT

## 2024-09-06 VITALS — HEIGHT: 68 IN | WEIGHT: 130.06 LBS | BODY MASS INDEX: 19.71 KG/M2

## 2024-09-06 DIAGNOSIS — Z98.890 POST-OPERATIVE STATE: Primary | ICD-10-CM

## 2024-09-06 DIAGNOSIS — M79.641 HAND PAIN, RIGHT: Primary | ICD-10-CM

## 2024-09-06 DIAGNOSIS — M24.549 CONTRACTURE OF JOINT OF FINGER, UNSPECIFIED LATERALITY: ICD-10-CM

## 2024-09-06 DIAGNOSIS — M25.441 SWELLING OF HAND JOINT, RIGHT: ICD-10-CM

## 2024-09-06 DIAGNOSIS — M25.60 RANGE OF MOTION DEFICIT: ICD-10-CM

## 2024-09-06 PROCEDURE — 97018 PARAFFIN BATH THERAPY: CPT

## 2024-09-06 PROCEDURE — 99999 PR PBB SHADOW E&M-EST. PATIENT-LVL III: CPT | Mod: PBBFAC,,, | Performed by: SPECIALIST/TECHNOLOGIST

## 2024-09-06 PROCEDURE — 97530 THERAPEUTIC ACTIVITIES: CPT

## 2024-09-06 PROCEDURE — 99213 OFFICE O/P EST LOW 20 MIN: CPT | Mod: PBBFAC | Performed by: SPECIALIST/TECHNOLOGIST

## 2024-09-06 PROCEDURE — 97140 MANUAL THERAPY 1/> REGIONS: CPT

## 2024-09-06 NOTE — PROGRESS NOTES
"9/6/24  Patient is 12 weeks status post right 2nd 3rd 4th and 5th MCP J arthroplasties, right index long and small finger PIP volar plate release and fusion with capsulotomy and boutonniere deformity of the thumb correction and closed reduction PIP joint fusion of the ring finger.  He has also 2 weeks status post hardware removal of the ring finger.  He notes that he is in minimal pain.  He reports he has been attending therapy regularly.  He denies any numbness or tingling.  He denies any fever chills or sweats since time of injury    8/7/24  Patient reports 6 weeks status postR 2nd, 3rd, 4th, and 5th MCPJ Arthroplasties, R Index, Long and Small Finger PIP Volar Plate Releasae and Fusion with capsulotomy, Boutieniere Deformity of Thumb Correction and Closed Reduction PIPJ Fusion of Ring Finger reports that he is in minimal pain.  Denies any fever chills or sweats since surgery.  He has been attending therapy regularly.    7/10/24  Mr. Uriarte is here today for a post-operative visit.  He is 14 days status post R 2nd, 3rd, 4th, and 5th MCPJ Arthroplasties, R Index, Long and Small Finger PIP Volar Plate Releasae and Fusion with capsulotomy, Boutieniere Deformity of Thumb Correction and Closed Reduction PIPJ Fusion of Ring Finger  by Dr. Lucas on 6/26/24. He reports that he is pain is minimal. He states he is taking Tylenol and Ibuprofen for pain. He was made a custom orthosis that he has been taking off during the day as well as nighttime.  He denies fever, chills, and sweats since the time of the surgery.     Physical exam:    Vitals:    09/06/24 1443   Weight: 59 kg (130 lb 1.1 oz)   Height: 5' 8" (1.727 m)       Vital signs are stable, patient is afebrile.  Patient is well dressed and well groomed, no acute distress.  Alert and oriented to person, place, and time.  Post op dressing taken down.  Incision is clean, dry and intact.  There is no erythema or exudate.  There is no sign of any infection. He is NVI. " Sutures removed without difficulty.  Extensor lag of MCPs noted. Able to abduct SF. 20 degrees of motion at MCPs.     Assessment:  s/p R 2nd, 3rd, 4th, and 5th MCPJ Arthroplasties, R Index, Long and Small Finger PIP Volar Plate Releasae and Fusion with capsulotomy, Elye Deformity of Thumb Correction and Closed Reduction PIPJ Fusion of Ring Finger         Plan:  Jethro was seen today for post-op evaluation.    Diagnoses and all orders for this visit:    Post-operative state    Contracture of joint of finger, unspecified laterality        Continue with regular therapy  Patient can progress his strengthening  Patient we will follow up in clinic in 6 weeks for a motion check

## 2024-09-06 NOTE — PROGRESS NOTES
"[facility]  Occupational Therapy Treatment & Progress Note    Date: 9/6/2024  Name: Jethro Uriarte III  Clinic Number: [id number]    Therapy Diagnosis: R MP capsulotomies/arthroplasties, PIP fusions 6/26/2024       Encounter Diagnoses   Name Primary?    Hand pain, right Yes    Range of motion deficit      Swelling of hand joint, right           Medical Diagnosis: Right MP capsulotomies and arthroplasties to all digits; PIP joint fusions digits 2, 3, 5; boutonierre deformity correction thumb, right      ICD-10:   Z98.890 (ICD-10-CM) - Post-operative state   M24.549 (ICD-10-CM) - Contracture of joint of finger, unspecified laterality         Physician: Mahesh Alcala PA-C; Dr. SUMAN Palmer   Physician Orders: Eval and Treat    "POSTOPERATIVE PLAN:  He should keep the dressings in place until his clinic visit in 2 weeks at which time the suture will be removed." - Referring MD surgical report from 08/21/2024     Surgical Procedure and Date: 6/26/24   RIGHT 2ND, 3RD, 4TH AND 5TH MCPJ SOFT TISSUE (WITH CAPSULOTOMIES) AND BALANCING WITH ARTHTOPLASTIES (Right)  INDEX, LONG, AND SMALL FINGER PIP VOLAR PLATE RELEAE AND FUSION WITH CAPSULOTOMY (Right)  BOUTIENIERE DEFORMITY OF THUMB CORRECTION (Right)  CLOSED REDUCTION, PIP JOINT, ARTICULAR, RING FINGER     Evaluation Date: 7/10/2024     Plan of Care Certification Period: 10/10/24     Date of Return to MD: 10/18/24     Visit # / Visits authorized: 12 / 14  Insurance Authorization Period Expiration: 10/13/24     FOTO #1: FSM___nt__ / GOAL ___nt__     Precautions:  Standard and Weightbearing; physical disabilities      Time In: 130  Time Out: 215  Total Appointment Time (timed & untimed codes): 45 minutes    SUBJECTIVE     Pt reports: Everything seems to be going smoothly since his latest procedure to remove a pin from his previous surgery.     He was compliant with home exercise program given last session.   Response to previous treatment:slightly less " pain   Functional change: none     Pain: 4/10  Location: right fingers dorsal, sore, tender, hurts       OBJECTIVE     Objective Measures updated at progress report unless specified.    Observation: Mr Uriarte is a 48-year-old male who has polymyositis almost a scleroderma picture with contractures involving both hands left greater than right.  He had almost  disuse of his hands bilaterally he requires constant aid and  help;  he has also wheelchair-bound     Edema. Measured in centimeters.    7/10/2024 9/6/24       Right    Proximal Wrist Crease 14 cm  =   Proximal Palmar Crease       MCPs 17 cm  16.6 (-0.4)         Hand ROM. Measured in degrees.  PIPs fused       7/10/2024 9/6/24       right          Index: MP  54/NT 58/65              PIP     Fused 25* flex Fused 25*              DIP 27/27 25/25              GONZALEZ             Long:  MP 50/NT 52/60              PIP Fused 25* flex =              DIP 30/30 30/40              GONZALEZ             Ring:   MP 55/NT 55/58              PIP Partially fused 30* =              DIP 40/40 40/55              GONZALEZ             Small:  MP 40/NT 35/40               PIP Fused 25* =               DIP 45/45 45/55              GONZALEZ             Thumb: MP 55/62                 IP 0/0        Rad ADD/ABD nt        Pal ADD/ABD nt           Opposition nt    Improved MP and DIP flexion this date.      Wrist ROM limited due to polymyositis   Wrist ext/flex 18 / 24   Sup/pro 50 / wfl   No significant RD/UD      Sensation- intact          Strength (Dyanmometer) and Pinch Strength (Pinch Gauge)  Measured in pounds and psi. Average of three trials.    7/10/2024 7/10/2024     Right  Left    Rung II NT NT   Key Pinch NT NT   3pt Pinch NT NT   2pt Pinch NT NT      Treatment     Jethro received the treatments listed below:     Supervised modalities  for 10 min after being cleared for contradictions: Paraffin bath -  poured paraffin into bag and placed on top of hand as it was too hot to don; wrapped in towel   "to maintain heat application to decrease joint stiffness and improve flexibilityand placed on therabar to maintain MP's in extension; monitored for pain/redness.      Therapeutic Activities to improve functional performance for 20 minutes, including:  - Gentle composite active MP flexion and passive MP extension with   - Place and hold for MP extension for digits 2-3 x 3-5 sec x 10 reps  - place and hold for individual digit's 2-3 extension x 3-5 sec x 10 reps each   - Active thumb flexion with passive abduction/extension , 3 sec hold, repeat  - Passive digit ab/ad for digits 2-3 x 15 reps each (NT)  - added light red rubberband for thumb abduction in limited range x 20 reps   - yellow and red clothespin for key pinch x 20 reps with coban for easier grasp.   - education on positioning at home for LLPS in MP extension utilizing a towel or small ball.   -  dice and pom poms with index-thumb opposition with no difficulty   - removed 10 grooved pegs from pegboard with no difficulty   - able to grossly grasp 1.5 and 2" PVC pipe for gross  with supination to neutral to simulate holding game controller   - added yellow putty in clinic for gross lumbrical  and  thumb adduction x 10 reps        Manual therapy techniques: for 10 min (including Joint mobilizations, Myofacial release, Manual Lymphatic Drainage, Soft tissue Mobilization, and Friction Massage)  were applied to the right hand  as follows:   - scar massage with mini massager and by hand   - STM to incision areas and finger flexors for digits 1-3  -- Gentle passive stretch to thumb to increase MP extension/abduction   - added gentle passive supination and wrist flex/ext  to improve wrist rotation and flexibility to hold game controller    Patient Education and Home Exercises      Education provided:   - Cont HEP   - Per above   - Progress towards goals   - orthotic check  - padding application for custom orthosis for finger spacers - the patient was " sent home with materials this date due to labor day holiday impacting scheduling.     Written Home Exercises Provided: Patient instructed to cont prior HEP.  Exercises were reviewed and Jethro was able to demonstrate them prior to the end of the session.  Jethro demonstrated good  understanding of the HEP provided. See EMR under Patient Instructions for exercises provided during therapy sessions.       Assessment     Jethro is progressing well towards his goals and there are no updates to goals at this time. Pt prognosis is Fair to Good. MP active extension remains limited with the pin for digit 4 requiring removal this week. Progressing with thumb flexion/abduction with noted improvement in resistive exercises for the thumb. Opposition index-thumb improving for functional light activities; Will progress as able. Wound was assessed this date with the suture remaining in place and the wound looking clean plus closed.Noted flexor tightness in the volar thumb and index finger this date. Patient requires further visits due to an additional surgical intervention required for improved pain levels and performance of activities of daily living.     Pt will continue to benefit from skilled outpatient occupational therapy to address the deficits listed in the problem list on initial evaluation provide pt/family education and to maximize pt's level of independence in the home and community environment.     Pt's spiritual, cultural and educational needs considered and pt agreeable to plan of care and goals.    Anticipated barriers to occupational therapy: none     The following goals were discussed with the patient and patient is in agreement with them as to be addressed in the treatment plan.      Short Term Goals: (6 weeks):  1)  Patient to be IND with HEP and modalities for pain management. MET  2)  Increase ROM 3-10 degrees to increase functional hand use for ADLs/work/leisure activities  3)   Decrease edema .1-.5mm to  increase joint mobility /flexibility for functional hand use.      Long Term Goals :To be met by discharge (12 weeks)   1) Patient to be able to grasp game controller and operate thumb control by discharge.   2)   Increase ROM 11-20 degrees to increase functional hand use for ADLs/work/leisure activities  3)  Patient to report improved functional use of hand > 40% as compared to prior to surgery.   Plan   Certification Period/Plan of care expiration: 7/10/2024 to 10/10/2024.    Meghna Peterson OTR/L

## 2024-09-17 ENCOUNTER — CLINICAL SUPPORT (OUTPATIENT)
Dept: REHABILITATION | Facility: HOSPITAL | Age: 49
End: 2024-09-17
Payer: OTHER GOVERNMENT

## 2024-09-17 DIAGNOSIS — M79.641 HAND PAIN, RIGHT: Primary | ICD-10-CM

## 2024-09-17 DIAGNOSIS — M25.60 RANGE OF MOTION DEFICIT: ICD-10-CM

## 2024-09-17 DIAGNOSIS — M25.441 SWELLING OF HAND JOINT, RIGHT: ICD-10-CM

## 2024-09-17 PROCEDURE — 97530 THERAPEUTIC ACTIVITIES: CPT

## 2024-09-17 PROCEDURE — 97018 PARAFFIN BATH THERAPY: CPT

## 2024-09-17 PROCEDURE — 97140 MANUAL THERAPY 1/> REGIONS: CPT

## 2024-09-17 NOTE — PROGRESS NOTES
"[facility]  Occupational Therapy Treatment & Progress Note    Date: 9/17/2024  Name: Jethro Uriarte III  Clinic Number: [id number]    Therapy Diagnosis: R MP capsulotomies/arthroplasties, PIP fusions 6/26/2024       Encounter Diagnoses   Name Primary?    Hand pain, right Yes    Range of motion deficit      Swelling of hand joint, right           Medical Diagnosis: Right MP capsulotomies and arthroplasties to all digits; PIP joint fusions digits 2, 3, 5; boutonierre deformity correction thumb, right      ICD-10:   Z98.890 (ICD-10-CM) - Post-operative state   M24.549 (ICD-10-CM) - Contracture of joint of finger, unspecified laterality         Physician: Mahesh Alcala PA-C; Dr. SUMAN Palmer   Physician Orders: Eval and Treat    "POSTOPERATIVE PLAN:  He should keep the dressings in place until his clinic visit in 2 weeks at which time the suture will be removed." - Referring MD surgical report from 08/21/2024     Surgical Procedure and Date: 6/26/24   RIGHT 2ND, 3RD, 4TH AND 5TH MCPJ SOFT TISSUE (WITH CAPSULOTOMIES) AND BALANCING WITH ARTHTOPLASTIES (Right)  INDEX, LONG, AND SMALL FINGER PIP VOLAR PLATE RELEAE AND FUSION WITH CAPSULOTOMY (Right)  BOUTIENIERE DEFORMITY OF THUMB CORRECTION (Right)  CLOSED REDUCTION, PIP JOINT, ARTICULAR, RING FINGER     Evaluation Date: 7/10/2024     Plan of Care Certification Period: 10/10/24     Date of Return to MD: 10/18/24     Visit # / Visits authorized: 13 / 14  Insurance Authorization Period Expiration: 10/13/24     FOTO #1: FSM___nt__ / GOAL ___nt__     Precautions:  Standard and Weightbearing; physical disabilities      Time In: 9  Time Out: 945  Total Appointment Time (timed & untimed codes): 45 minutes    SUBJECTIVE     Pt reports:  using hand to hold phone and text with thumb observed.  Able to hold light game controller per report; unable to hold bottle of water     He was compliant with home exercise program given last session.   Response to previous " treatment:slightly less pain   Functional change: none     Pain: 4/10  Location: right fingers dorsal, sore, tender, hurts       OBJECTIVE     Objective Measures updated at progress report unless specified.    Observation: Mr Uriarte is a 48-year-old male who has polymyositis almost a scleroderma picture with contractures involving both hands left greater than right.  He had almost  disuse of his hands bilaterally he requires constant aid and  help;  he has also wheelchair-bound     Edema. Measured in centimeters.    7/10/2024 9/6/24       Right    Proximal Wrist Crease 14 cm  =   Proximal Palmar Crease       MCPs 17 cm  16.6 (-0.4)         Hand ROM. Measured in degrees.  PIPs fused       7/10/2024 9/6/24       right          Index: MP  54/NT 58/65              PIP     Fused 25* flex Fused 25*              DIP 27/27 25/25              GONZALEZ             Long:  MP 50/NT 52/60              PIP Fused 25* flex =              DIP 30/30 30/40              GONZALEZ             Ring:   MP 55/NT 55/58              PIP Partially fused 30* =              DIP 40/40 40/55              GONZALEZ             Small:  MP 40/NT 35/40               PIP Fused 25* =               DIP 45/45 45/55              GONZALEZ             Thumb: MP 55/62                 IP 0/0        Rad ADD/ABD nt        Pal ADD/ABD nt           Opposition nt    Improved MP and DIP flexion this date.      Wrist ROM limited due to polymyositis   Wrist ext/flex 18 / 24   Sup/pro 50 / wfl   No significant RD/UD      Sensation- intact          Strength (Dyanmometer) and Pinch Strength (Pinch Gauge)  Measured in pounds and psi. Average of three trials.    7/10/2024 7/10/2024     Right  Left    Rung II NT NT   Key Pinch NT NT   3pt Pinch NT NT   2pt Pinch NT NT      Treatment     Jethro received the treatments listed below:     Supervised modalities  for 10 min after being cleared for contradictions: Paraffin bath -  poured paraffin into bag and placed on top of hand as it was too hot  "to don; wrapped in towel  to maintain heat application to decrease joint stiffness and improve flexibilityand placed on therabar to maintain MP's in extension; monitored for pain/redness.      Therapeutic Activities to improve functional performance for 20 minutes, including:  - Gentle composite active MP flexion and passive MP extension with   - Place and hold for MP extension for digits 2-3 x 3-5 sec x 10 reps  - place and hold for individual digit's 2-3 extension x 3-5 sec x 10 reps each   - Active thumb flexion with passive abduction/extension , 3 sec hold, repeat  - Passive digit ab/ad for digits 2-3 x 15 reps each (NT)  - added light red rubberband for thumb abduction in limited range x 20 reps   - yellow and red clothespin for key pinch x 20 reps with coban for easier grasp.   - education on positioning at home for LLPS in MP extension utilizing a towel or small ball.   -  dice and pom poms with index-thumb opposition with no difficulty   - removed 10 grooved pegs from pegboard with no difficulty   - able to grossly grasp 1.5 and 2" PVC pipe for gross  with supination to neutral to simulate holding game controller   - added yellow putty in clinic for gross lumbrical  and  thumb adduction x 10 reps        Manual therapy techniques: for 10 min (including Joint mobilizations, Myofacial release, Manual Lymphatic Drainage, Soft tissue Mobilization, and Friction Massage)  were applied to the right hand  as follows:   - scar massage with mini massager and by hand   - STM to incision areas and finger flexors for digits 1-3  -- Gentle passive stretch to thumb to increase MP extension/abduction   - added gentle passive supination and wrist flex/ext  to improve wrist rotation and flexibility to hold game controller    Patient Education and Home Exercises      Education provided:   - Cont HEP   - Per above   - Progress towards goals   - orthotic check  - padding application for custom orthosis for finger " spacers - the patient was sent home with materials this date due to labor day holiday impacting scheduling.     Written Home Exercises Provided: Patient instructed to cont prior HEP.  Exercises were reviewed and Jethro was able to demonstrate them prior to the end of the session.  Jethro demonstrated good  understanding of the HEP provided. See EMR under Patient Instructions for exercises provided during therapy sessions.       Assessment     Jethro is progressing well towards his goals and there are no updates to goals at this time. Pt prognosis is Fair to Good. MP active extension remains limited.  Progressing with thumb flexion/abduction with noted improvement in resistive exercises for the thumb. Opposition index-thumb improving for functional light activities; Will progress as able.  Patient requires further visits due to  improve hand function and  performance of activities of daily living.     Pt will continue to benefit from skilled outpatient occupational therapy to address the deficits listed in the problem list on initial evaluation provide pt/family education and to maximize pt's level of independence in the home and community environment.     Pt's spiritual, cultural and educational needs considered and pt agreeable to plan of care and goals.    Anticipated barriers to occupational therapy: none     The following goals were discussed with the patient and patient is in agreement with them as to be addressed in the treatment plan.      Short Term Goals: (6 weeks):  1)  Patient to be IND with HEP and modalities for pain management. MET  2)  Increase ROM 3-10 degrees to increase functional hand use for ADLs/work/leisure activities  3)   Decrease edema .1-.5mm to increase joint mobility /flexibility for functional hand use.      Long Term Goals :To be met by discharge (12 weeks)   1) Patient to be able to grasp game controller and operate thumb control by discharge.   2)   Increase ROM 11-20 degrees to increase  functional hand use for ADLs/work/leisure activities  3)  Patient to report improved functional use of hand > 40% as compared to prior to surgery.   Plan   Certification Period/Plan of care expiration: 7/10/2024 to 10/10/2024.    Meghan Peterson, OTR/L, CHT

## 2024-09-24 ENCOUNTER — CLINICAL SUPPORT (OUTPATIENT)
Dept: REHABILITATION | Facility: HOSPITAL | Age: 49
End: 2024-09-24
Payer: OTHER GOVERNMENT

## 2024-09-24 DIAGNOSIS — Z98.890 POST-OPERATIVE STATE: ICD-10-CM

## 2024-09-24 DIAGNOSIS — M25.60 RANGE OF MOTION DEFICIT: ICD-10-CM

## 2024-09-24 DIAGNOSIS — M24.549 CONTRACTURE OF JOINT OF FINGER, UNSPECIFIED LATERALITY: ICD-10-CM

## 2024-09-24 DIAGNOSIS — M79.641 HAND PAIN, RIGHT: Primary | ICD-10-CM

## 2024-09-24 DIAGNOSIS — M25.441 SWELLING OF HAND JOINT, RIGHT: ICD-10-CM

## 2024-09-24 PROCEDURE — 97140 MANUAL THERAPY 1/> REGIONS: CPT

## 2024-09-24 PROCEDURE — 97018 PARAFFIN BATH THERAPY: CPT

## 2024-09-24 PROCEDURE — 97530 THERAPEUTIC ACTIVITIES: CPT

## 2024-09-24 NOTE — PROGRESS NOTES
"[facility]  Occupational Therapy Treatment & Progress Note    Date: 9/24/2024  Name: Jethro Uriarte III  Clinic Number: [id number]    Therapy Diagnosis: R MP capsulotomies/arthroplasties, PIP fusions 6/26/2024       Encounter Diagnoses   Name Primary?    Hand pain, right Yes    Range of motion deficit      Swelling of hand joint, right           Medical Diagnosis: Right MP capsulotomies and arthroplasties to all digits; PIP joint fusions digits 2, 3, 5; boutonierre deformity correction thumb, right      ICD-10:   Z98.890 (ICD-10-CM) - Post-operative state   M24.549 (ICD-10-CM) - Contracture of joint of finger, unspecified laterality         Physician: Mahesh Alcala PA-C; Dr. SUMAN Palmer   Physician Orders: Eval and Treat    "POSTOPERATIVE PLAN:  He should keep the dressings in place until his clinic visit in 2 weeks at which time the suture will be removed." - Referring MD surgical report from 08/21/2024     Surgical Procedure and Date: 6/26/24   RIGHT 2ND, 3RD, 4TH AND 5TH MCPJ SOFT TISSUE (WITH CAPSULOTOMIES) AND BALANCING WITH ARTHTOPLASTIES (Right)  INDEX, LONG, AND SMALL FINGER PIP VOLAR PLATE RELEAE AND FUSION WITH CAPSULOTOMY (Right)  BOUTIENIERE DEFORMITY OF THUMB CORRECTION (Right)  CLOSED REDUCTION, PIP JOINT, ARTICULAR, RING FINGER     Evaluation Date: 7/10/2024     Plan of Care Certification Period: 10/10/24     Date of Return to MD: 10/18/24     Visit # / Visits authorized: 16/29  Insurance Authorization Period Expiration: 10/13/24     FOTO #1: FSM___nt__ / GOAL ___nt__     Precautions:  Standard and Weightbearing; physical disabilities      Time In: 1115  Time Out: 12  Total Appointment Time (timed & untimed codes): 45 minutes    SUBJECTIVE     Pt reports: I can use the [employer] , its lighter and not as  bulky but still not using playstation controller; still hard to grasp bottle of water   He was compliant with home exercise program given last session. "   Response to previous treatment:slightly less pain   Functional change: none     Pain: 4/10  Location: right fingers dorsal, sore, tender, hurts       OBJECTIVE   Update POC on 10/10/24    Objective Measures updated at progress report unless specified.  Re-assessed per below:     Observation: Mr Uriarte is a 48-year-old male who has polymyositis almost a scleroderma picture with contractures involving both hands left greater than right.  He had almost  disuse of his hands bilaterally he requires constant aid and  help;  he has also wheelchair-bound     Edema. Measured in centimeters.    7/10/2024 9/6/24 9/24/24       Right     Proximal Wrist Crease 14 cm  =    Proximal Palmar Crease        MCPs 17 cm  16.6 (-0.4) 16.4 (-0.2)         Hand ROM. Measured in degrees.  PIPs fused       7/10/2024 9/6/24 9/24/24*       right Right            Index: MP  54/NT 58/65 =              PIP     Fused 25* flex Fused 25*               DIP 27/27 25/25 35/35              GONZALEZ               Long:  MP 50/NT 52/60 50/65              PIP Fused 25* flex = =              DIP 30/30 30/40 35/40              GONZALEZ               Ring:   MP 55/NT 55/58 50/65              PIP Partially fused 30* = =               DIP 40/40 40/55 38/52              GONZALEZ               Small:  MP 40/NT 35/40 26/40               PIP Fused 25* = =               DIP 45/45 45/55 52/55              GONZALEZ               Thumb: MP 55/62  48/65                IP 0/0  0/0       Rad ADD/ABD nt  45       Pal ADD/ABD nt  45          Opposition nt     Passive MP's ROM  40-70; Opposition to LF, 5 cm opening from index to thumb.     Wrist ROM limited due to polymyositis   Wrist ext/flex 18 / 24   Sup/pro 50 / wfl   No significant RD/UD      Sensation- intact          Strength (Dyanmometer) and Pinch Strength (Pinch Gauge)  Measured in pounds and psi. Average of three trials.    7/10/2024 7/10/2024     Right  Left    Rung II NT NT   Key Pinch NT NT   3pt Pinch NT NT   2pt Pinch NT NT     "  Treatment     Jethro received the treatments listed below:     Supervised modalities  for 10 min after being cleared for contradictions: Paraffin bath -  poured paraffin into bag and placed on top of hand as it was too hot to don; wrapped in towel  to maintain heat application to decrease joint stiffness and improve flexibilityand placed on therabar to maintain MP's in extension; monitored for pain/redness.      Therapeutic Activities to improve functional performance for 20 minutes, including:  - re-assessed per above   - Gentle composite active MP flexion and passive MP extension with   - Place and hold for MP extension for digits 2-3 x 3-5 sec x 10 reps  - place and hold for individual digit's 2-3 extension x 3-5 sec x 10 reps each   - Active thumb flexion with passive abduction/extension , 3 sec hold, repeat  - Passive digit ab/ad for digits 2-3 x 15 reps each (NT)  - added light red rubberband for thumb abduction in limited range x 20 reps   - yellow and red clothespin for key pinch x 20 reps with coban for easier grasp.   - education on positioning at home for LLPS in MP extension utilizing a towel or small ball.   -  dice and pom poms with index-thumb opposition with no difficulty   - removed 10 grooved pegs from pegboard with no difficulty   - able to grossly grasp 1.5 and 2" PVC pipe for gross  with supination to neutral to simulate holding game controller   - added yellow putty in clinic for gross lumbrical  and  thumb adduction x 10 reps   - 10 g finger wt for thumb ab/ad and thumb extension with 3 sec hold x 10 reps each   Mixed putty for home thumb adduction, key pinch, gross         Manual therapy techniques: for 10 min (including Joint mobilizations, Myofacial release, Manual Lymphatic Drainage, Soft tissue Mobilization, and Friction Massage)  were applied to the right hand  as follows:   - scar massage with mini massager and by hand   - STM to incision areas and finger flexors for " digits 1-3  -- Gentle passive stretch to thumb to increase MP extension/abduction   - added gentle passive supination and wrist flex/ext  to improve wrist rotation and flexibility to hold game controller    Patient Education and Home Exercises      Education provided:   - Cont HEP   - Per above   - Progress towards goals       Written Home Exercises Provided: Patient instructed to cont prior HEP.  Exercises were reviewed and Jethro was able to demonstrate them prior to the end of the session.  Jethro demonstrated good  understanding of the HEP provided. See EMR under Patient Instructions for exercises provided during therapy sessions.       Assessment     Jethro is progressing well towards his goals and there are no updates to goals at this time. Pt prognosis is Fair to Good. MP active extension remains limited.   Progressing with thumb flexion/abduction with noted improvement in resistive exercises for the thumb. Opposition index-thumb improving for functional light activities; Will progress as able.  Patient requires further visits to improve performance of activities of daily living and hand function. All STGs met     Pt will continue to benefit from skilled outpatient occupational therapy to address the deficits listed in the problem list on initial evaluation provide pt/family education and to maximize pt's level of independence in the home and community environment.     Pt's spiritual, cultural and educational needs considered and pt agreeable to plan of care and goals.    Anticipated barriers to occupational therapy: none     The following goals were discussed with the patient and patient is in agreement with them as to be addressed in the treatment plan.      Short Term Goals: (6 weeks):  1)  Patient to be IND with HEP and modalities for pain management. MET  2)  Increase ROM 3-10 degrees to increase functional hand use for ADLs/work/leisure activities- met  3)   Decrease edema .1-.5mm to increase joint  mobility /flexibility for functional hand use. - met     Long Term Goals :To be met by discharge (12 weeks)   1) Patient to be able to grasp game controller (playstation) and operate thumb control by discharge.   2)   Increase ROM 11-20 degrees to increase functional hand use for ADLs/work/leisure activities  3)  Patient to report improved functional use of hand > 40% as compared to prior to surgery.   Plan   Certification Period/Plan of care expiration: 7/10/2024 to 10/10/2024.    Meghan Peterson, OTR/L, CHT

## 2024-10-01 ENCOUNTER — CLINICAL SUPPORT (OUTPATIENT)
Dept: REHABILITATION | Facility: HOSPITAL | Age: 49
End: 2024-10-01
Payer: OTHER GOVERNMENT

## 2024-10-01 DIAGNOSIS — M79.641 HAND PAIN, RIGHT: Primary | ICD-10-CM

## 2024-10-01 DIAGNOSIS — M25.60 RANGE OF MOTION DEFICIT: ICD-10-CM

## 2024-10-01 DIAGNOSIS — M25.441 SWELLING OF HAND JOINT, RIGHT: ICD-10-CM

## 2024-10-01 PROCEDURE — 97530 THERAPEUTIC ACTIVITIES: CPT

## 2024-10-01 PROCEDURE — 97018 PARAFFIN BATH THERAPY: CPT

## 2024-10-01 PROCEDURE — 97140 MANUAL THERAPY 1/> REGIONS: CPT

## 2024-10-01 NOTE — PROGRESS NOTES
"[facility]  Occupational Therapy Treatment & Progress Note    Date: 10/1/2024  Name: Jethro Uriarte III  Clinic Number: [id number]    Therapy Diagnosis: R MP capsulotomies/arthroplasties, PIP fusions and PIn removal 6/26/2024 & 8/21/24       Encounter Diagnoses   Name Primary?    Hand pain, right Yes    Range of motion deficit      Swelling of hand joint, right           Medical Diagnosis: Right MP capsulotomies and arthroplasties to all digits; PIP joint fusions digits 2, 3, 5; boutonierre deformity correction thumb, right      ICD-10:   Z98.890 (ICD-10-CM) - Post-operative state   M24.549 (ICD-10-CM) - Contracture of joint of finger, unspecified laterality         Physician: Mahesh Alcala PA-C; Dr. SUMAN Palmer   Physician Orders: Eval and Treat    "POSTOPERATIVE PLAN:  He should keep the dressings in place until his clinic visit in 2 weeks at which time the suture will be removed." - Referring MD surgical report from 08/21/2024     Surgical Procedure and Date: 6/26/24   RIGHT 2ND, 3RD, 4TH AND 5TH MCPJ SOFT TISSUE (WITH CAPSULOTOMIES) AND BALANCING WITH ARTHTOPLASTIES (Right)  INDEX, LONG, AND SMALL FINGER PIP VOLAR PLATE RELEAE AND FUSION WITH CAPSULOTOMY (Right)  BOUTIENIERE DEFORMITY OF THUMB CORRECTION (Right)  CLOSED REDUCTION, PIP JOINT, ARTICULAR, RING FINGER     Evaluation Date: 7/10/2024     Plan of Care Certification Period: 10/10/24     Date of Return to MD: 10/18/24     Visit # / Visits authorized: 17/29  Insurance Authorization Period Expiration: 10/13/24     FOTO #1: FSM___nt__ / GOAL ___nt__     Precautions:  Standard and Weightbearing; physical disabilities      Time In: 1115  Time Out: 12  Total Appointment Time (timed & untimed codes): 45 minutes    SUBJECTIVE     Pt reports: I can use the [employer] , its lighter and not as  bulky but still not using playstation controller; still hard to grasp bottle of water but getting better   He was compliant with home " exercise program given last session.   Response to previous treatment:slightly less pain   Functional change: none     Pain: 3/10  Location: right fingers dorsal, sore, tender, hurts       OBJECTIVE   Update POC on 10/10/24     Objective Measures updated at progress report unless specified.    Observation: Mr Uriarte is a 48-year-old male who has polymyositis almost a scleroderma picture with contractures involving both hands left greater than right.  He had almost  disuse of his hands bilaterally he requires constant aid and  help;  he has also wheelchair-bound     Edema. Measured in centimeters.    7/10/2024 9/6/24 9/24/24       Right     Proximal Wrist Crease 14 cm  =    Proximal Palmar Crease        MCPs 17 cm  16.6 (-0.4) 16.4 (-0.2)         Hand ROM. Measured in degrees.  PIPs fused       7/10/2024 9/6/24 9/24/24*       right Right            Index: MP  54/NT 58/65 =              PIP     Fused 25* flex Fused 25*               DIP 27/27 25/25 35/35              GONZALEZ               Long:  MP 50/NT 52/60 50/65              PIP Fused 25* flex = =              DIP 30/30 30/40 35/40              GONZALEZ               Ring:   MP 55/NT 55/58 50/65              PIP Partially fused 30* = =               DIP 40/40 40/55 38/52              GONZALEZ               Small:  MP 40/NT 35/40 26/40               PIP Fused 25* = =               DIP 45/45 45/55 52/55              GONZALEZ               Thumb: MP 55/62  48/65                IP 0/0  0/0       Rad ADD/ABD nt  45       Pal ADD/ABD nt  45          Opposition nt     Passive MP's ROM  40-70; Opposition to LF, 5 cm opening from index to thumb.     Wrist ROM limited due to polymyositis   Wrist ext/flex 18 / 24   Sup/pro 50 / wfl   No significant RD/UD      Sensation- intact          Strength (Dyanmometer) and Pinch Strength (Pinch Gauge)  Measured in pounds and psi. Average of three trials.    7/10/2024 7/10/2024     Right  Left    Rung II NT NT   Key Pinch NT NT   3pt Pinch NT NT   2pt  "Pinch NT NT      Treatment     Jethro received the treatments listed below:     Supervised modalities  for 10 min after being cleared for contradictions: Paraffin bath -  poured paraffin into bag and placed on top of hand as it was too hot to don; wrapped in towel  to maintain heat application to decrease joint stiffness and improve flexibilityand placed on therabar to maintain MP's in extension; monitored for pain/redness.      Therapeutic Activities to improve functional performance for 20 minutes, including:  - Gentle composite active MP flexion and passive MP extension with   - Place and hold for MP extension for digits 2-3 x 3-5 sec x 10 reps  - place and hold for individual digit's 2-3 extension x 3-5 sec x 10 reps each   - Active thumb flexion with passive abduction/extension , 3 sec hold, repeat  - 10 g finger wt to thumb for ab/ad and thumb extension with 3 sec hold x 10 reps   - Passive digit ab/ad for digits 2-3 x 15 reps each (NT)  - - 2# red clothespin for key pinch x 20 reps with coban for easier grasp.   - education on positioning at home for LLPS in MP extension utilizing a towel or small ball.   --- able to grossly grasp 1.5 " PVC pipe for gross  into red putty and putty drags x 10 reps for /hand strengthening   - added soft red  putty in clinic for gross lumbrical  and  thumb adduction x 10 reps        Manual therapy techniques: for 10 min (including Joint mobilizations, Myofacial release, Manual Lymphatic Drainage, Soft tissue Mobilization, and Friction Massage)  were applied to the right hand  as follows:   - scar massage with mini massager and by hand   - STM to incision areas and finger flexors for digits 1-3  -- Gentle passive stretch to thumb to increase MP extension/abduction   - added gentle passive supination and wrist flex/ext  to improve wrist rotation and flexibility to hold game controller    Patient Education and Home Exercises      Education provided:   - Cont HEP   - Per " above   - Progress towards goals   - orthotic check  - padding application for custom orthosis for finger spacers - the patient was sent home with materials this date due to labor day holiday impacting scheduling.     Written Home Exercises Provided: Patient instructed to cont prior HEP.  Exercises were reviewed and Jethro was able to demonstrate them prior to the end of the session.  Jethro demonstrated good  understanding of the HEP provided. See EMR under Patient Instructions for exercises provided during therapy sessions.       Assessment     Jethro is progressing well towards his goals and there are no updates to goals at this time. Pt prognosis is Fair to Good. MP active extension remains limited actively.  Progressing with thumb flexion/abduction with noted improvement in resistive exercises for the thumb. Opposition index-thumb improving for functional light activities; Will progress as able.  Patient requires further visits to improve performance of activities of daily living.     Pt will continue to benefit from skilled outpatient occupational therapy to address the deficits listed in the problem list on initial evaluation provide pt/family education and to maximize pt's level of independence in the home and community environment.     Pt's spiritual, cultural and educational needs considered and pt agreeable to plan of care and goals.    Anticipated barriers to occupational therapy: none     The following goals were discussed with the patient and patient is in agreement with them as to be addressed in the treatment plan.      Short Term Goals: (6 weeks):  1)  Patient to be IND with HEP and modalities for pain management. MET  2)  Increase ROM 3-10 degrees to increase functional hand use for ADLs/work/leisure activities- met  3)   Decrease edema .1-.5mm to increase joint mobility /flexibility for functional hand use. - met     Long Term Goals :To be met by discharge (12 weeks)   1) Patient to be able to grasp  game controller and operate thumb control by discharge. Partially met (dreamcast controller only)   2)   Increase ROM 11-20 degrees to increase functional hand use for ADLs/work/leisure activities  3)  Patient to report improved functional use of hand > 40% as compared to prior to surgery.   Plan   Certification Period/Plan of care expiration: 7/10/2024 to 10/10/2024.    Meghan Peterson, OTR/L, CHT

## 2024-10-08 ENCOUNTER — CLINICAL SUPPORT (OUTPATIENT)
Dept: REHABILITATION | Facility: HOSPITAL | Age: 49
End: 2024-10-08
Payer: OTHER GOVERNMENT

## 2024-10-08 DIAGNOSIS — M79.641 HAND PAIN, RIGHT: Primary | ICD-10-CM

## 2024-10-08 DIAGNOSIS — M25.441 SWELLING OF HAND JOINT, RIGHT: ICD-10-CM

## 2024-10-08 DIAGNOSIS — M25.60 RANGE OF MOTION DEFICIT: ICD-10-CM

## 2024-10-08 PROCEDURE — 97018 PARAFFIN BATH THERAPY: CPT

## 2024-10-08 PROCEDURE — 97140 MANUAL THERAPY 1/> REGIONS: CPT

## 2024-10-08 PROCEDURE — 97530 THERAPEUTIC ACTIVITIES: CPT

## 2024-10-08 NOTE — PROGRESS NOTES
"[facility]  Occupational Therapy Treatment & Progress Note    Date: 10/8/2024  Name: Jethro Uriarte III  Clinic Number: [id number]    Therapy Diagnosis: R MP capsulotomies/arthroplasties, PIP fusions and PIn removal 6/26/2024 & 8/21/24       Encounter Diagnoses   Name Primary?    Hand pain, right Yes    Range of motion deficit      Swelling of hand joint, right           Medical Diagnosis: Right MP capsulotomies and arthroplasties to all digits; PIP joint fusions digits 2, 3, 5; boutonierre deformity correction thumb, right      ICD-10:   Z98.890 (ICD-10-CM) - Post-operative state   M24.549 (ICD-10-CM) - Contracture of joint of finger, unspecified laterality         Physician: Mahesh Alcala PA-C; Dr. SUMAN Palmer   Physician Orders: Eval and Treat    "POSTOPERATIVE PLAN:  He should keep the dressings in place until his clinic visit in 2 weeks at which time the suture will be removed." - Referring MD surgical report from 08/21/2024     Surgical Procedure and Date: 6/26/24   RIGHT 2ND, 3RD, 4TH AND 5TH MCPJ SOFT TISSUE (WITH CAPSULOTOMIES) AND BALANCING WITH ARTHTOPLASTIES (Right)  INDEX, LONG, AND SMALL FINGER PIP VOLAR PLATE RELEAE AND FUSION WITH CAPSULOTOMY (Right)  BOUTIENIERE DEFORMITY OF THUMB CORRECTION (Right)  CLOSED REDUCTION, PIP JOINT, ARTICULAR, RING FINGER     Evaluation Date: 7/10/2024     Plan of Care Certification Period: 10/10/24     Date of Return to MD: 10/18/24     Visit # / Visits authorized: 18/29  Insurance Authorization Period Expiration: 10/13/24     FOTO #1: FSM___nt__ / GOAL ___nt__     Precautions:  Standard and Weightbearing; physical disabilities      Time In: 1115  Time Out: 12  Total Appointment Time (timed & untimed codes): 45 minutes    SUBJECTIVE     Pt reports: I can use the [employer] , its lighter and not as  bulky but still not using playstation controller; still hard to grasp bottle of water but getting better   He was compliant with home " exercise program given last session.   Response to previous treatment:slightly less pain   Functional change: none     Pain: 3/10  Location: right fingers dorsal, sore, tender, hurts       OBJECTIVE   Update POC on 10/10/24     Objective Measures updated at progress report unless specified.    Observation: Mr Uriarte is a 48-year-old male who has polymyositis almost a scleroderma picture with contractures involving both hands left greater than right.  He had almost  disuse of his hands bilaterally he requires constant aid and  help;  he has also wheelchair-bound     Edema. Measured in centimeters.    7/10/2024 9/6/24 9/24/24       Right     Proximal Wrist Crease 14 cm  =    Proximal Palmar Crease        MCPs 17 cm  16.6 (-0.4) 16.4 (-0.2)         Hand ROM. Measured in degrees.  PIPs fused       7/10/2024 9/6/24 9/24/24*       right Right            Index: MP  54/NT 58/65 =              PIP     Fused 25* flex Fused 25*               DIP 27/27 25/25 35/35              GONZALEZ               Long:  MP 50/NT 52/60 50/65              PIP Fused 25* flex = =              DIP 30/30 30/40 35/40              GONZALEZ               Ring:   MP 55/NT 55/58 50/65              PIP Partially fused 30* = =               DIP 40/40 40/55 38/52              GONZALEZ               Small:  MP 40/NT 35/40 26/40               PIP Fused 25* = =               DIP 45/45 45/55 52/55              GONZALEZ               Thumb: MP 55/62  48/65                IP 0/0  0/0       Rad ADD/ABD nt  45       Pal ADD/ABD nt  45          Opposition nt     Passive MP's ROM  40-70; Opposition to LF, 5 cm opening from index to thumb.     Wrist ROM limited due to polymyositis   Wrist ext/flex 18 / 24   Sup/pro 50 / wfl   No significant RD/UD      Sensation- intact          Strength (Dyanmometer) and Pinch Strength (Pinch Gauge)  Measured in pounds and psi. Average of three trials.    7/10/2024 7/10/2024     Right  Left    Rung II NT NT   Key Pinch NT NT   3pt Pinch NT NT   2pt  "Pinch NT NT      Treatment     Jethro received the treatments listed below:     Supervised modalities  for 10 min after being cleared for contradictions: Paraffin bath -  poured paraffin into bag and placed on top of hand as it was too hot to don; wrapped in towel  to maintain heat application to decrease joint stiffness and improve flexibilityand placed on therabar to maintain MP's in extension; monitored for pain/redness.      Therapeutic Activities to improve functional performance for 20 minutes, including:  - Gentle composite active MP flexion and passive MP extension with   - Place and hold for MP extension for digits 2-3 x 3-5 sec x 10 reps  - place and hold for individual digit's 2-3 extension x 3-5 sec x 10 reps each   - Active thumb flexion with passive abduction/extension , 3 sec hold, repeat  - 10 g finger wt to thumb for ab/ad and thumb extension with 3 sec hold x 10 reps   - Passive digit ab/ad for digits 2-3 x 15 reps each (NT)  - - 2# and 4# clothespin for key pinch x 20 reps with coban for easier grasp.   - education on positioning at home for LLPS in MP extension utilizing a towel or small ball.   --- able to grossly grasp 1.5 " PVC pipe for gross  into red putty and putty drags x 10 reps for /hand strengthening   - added soft red  putty in clinic for gross lumbrical  and  thumb adduction x 10 reps        Manual therapy techniques: for 15 min (including Joint mobilizations, Myofacial release, Manual Lymphatic Drainage, Soft tissue Mobilization, and Friction Massage)  were applied to the right hand  as follows:   - scar massage with mini massager and by hand   - STM to incision areas and finger flexors for digits 1-3  -- Gentle passive stretch to thumb to increase MP extension/abduction   - added gentle passive supination and wrist flex/ext  to improve wrist rotation and flexibility to hold game controller    Patient Education and Home Exercises      Education provided:   - Cont HEP   - " Per above   - Progress towards goals   - orthotic check  - padding application for custom orthosis for finger spacers - the patient was sent home with materials this date due to labor day holiday impacting scheduling.     Written Home Exercises Provided: Patient instructed to cont prior HEP.  Exercises were reviewed and Jethro was able to demonstrate them prior to the end of the session.  Jethro demonstrated good  understanding of the HEP provided. See EMR under Patient Instructions for exercises provided during therapy sessions.       Assessment     Jethro is progressing well towards his goals and there are no updates to goals at this time. Pt prognosis is Fair to Good. MP active extension remains limited actively.  Progressing with thumb flexion/abduction with noted improvement in resistive exercises for the thumb. Opposition index-thumb improving for functional light activities; Will progress as able.  Patient requires further visits to improve performance of activities of daily living.     Pt will continue to benefit from skilled outpatient occupational therapy to address the deficits listed in the problem list on initial evaluation provide pt/family education and to maximize pt's level of independence in the home and community environment.     Pt's spiritual, cultural and educational needs considered and pt agreeable to plan of care and goals.    Anticipated barriers to occupational therapy: none     The following goals were discussed with the patient and patient is in agreement with them as to be addressed in the treatment plan.      Short Term Goals: (6 weeks):  1)  Patient to be IND with HEP and modalities for pain management. MET  2)  Increase ROM 3-10 degrees to increase functional hand use for ADLs/work/leisure activities- met  3)   Decrease edema .1-.5mm to increase joint mobility /flexibility for functional hand use. - met     Long Term Goals :To be met by discharge (12 weeks)   1) Patient to be able to  grasp game controller and operate thumb control by discharge. Partially met (dreamcast controller only)   2)   Increase ROM 11-20 degrees to increase functional hand use for ADLs/work/leisure activities  3)  Patient to report improved functional use of hand > 40% as compared to prior to surgery.   Plan   Certification Period/Plan of care expiration: 7/10/2024 to 10/10/2024.    Meghan Peterson, OTR/L, CHT

## 2024-10-15 ENCOUNTER — CLINICAL SUPPORT (OUTPATIENT)
Dept: REHABILITATION | Facility: HOSPITAL | Age: 49
End: 2024-10-15
Payer: OTHER GOVERNMENT

## 2024-10-15 DIAGNOSIS — M25.441 SWELLING OF HAND JOINT, RIGHT: ICD-10-CM

## 2024-10-15 DIAGNOSIS — M79.641 HAND PAIN, RIGHT: Primary | ICD-10-CM

## 2024-10-15 DIAGNOSIS — M25.60 RANGE OF MOTION DEFICIT: ICD-10-CM

## 2024-10-15 PROCEDURE — 97140 MANUAL THERAPY 1/> REGIONS: CPT

## 2024-10-15 PROCEDURE — 97018 PARAFFIN BATH THERAPY: CPT

## 2024-10-15 PROCEDURE — 97110 THERAPEUTIC EXERCISES: CPT

## 2024-10-15 NOTE — PROGRESS NOTES
"[facility]  Occupational Therapy Treatment & Progress Note    Date: 10/15/2024  Name: Jethro Uriarte III  Clinic Number: [id number]    Therapy Diagnosis: R MP capsulotomies/arthroplasties, PIP fusions and PIn removal 6/26/2024 & 8/21/24       Encounter Diagnoses   Name Primary?    Hand pain, right Yes    Range of motion deficit      Swelling of hand joint, right           Medical Diagnosis: Right MP capsulotomies and arthroplasties to all digits; PIP joint fusions digits 2, 3, 5; boutonierre deformity correction thumb, right      ICD-10:   Z98.890 (ICD-10-CM) - Post-operative state   M24.549 (ICD-10-CM) - Contracture of joint of finger, unspecified laterality         Physician: Mahesh Alcala PA-C; Dr. SUMAN Palmer   Physician Orders: Eval and Treat    "POSTOPERATIVE PLAN:  He should keep the dressings in place until his clinic visit in 2 weeks at which time the suture will be removed." - Referring MD surgical report from 08/21/2024     Surgical Procedure and Date: 6/26/24   RIGHT 2ND, 3RD, 4TH AND 5TH MCPJ SOFT TISSUE (WITH CAPSULOTOMIES) AND BALANCING WITH ARTHTOPLASTIES (Right)  INDEX, LONG, AND SMALL FINGER PIP VOLAR PLATE RELEAE AND FUSION WITH CAPSULOTOMY (Right)  BOUTIENIERE DEFORMITY OF THUMB CORRECTION (Right)  CLOSED REDUCTION, PIP JOINT, ARTICULAR, RING FINGER     Evaluation Date: 7/10/2024     Plan of Care Certification Period: 10/10/24     Date of Return to MD: 10/18/24     Visit # / Visits authorized: 19/29  Insurance Authorization Period Expiration: 10/13/24     FOTO #1: FSM___nt__ / GOAL ___nt__     Precautions:  Standard and Weightbearing; physical disabilities      Time In: 1115  Time Out: 12  Total Appointment Time (timed & untimed codes): 45 minutes    SUBJECTIVE     Pt reports: Im able to do a little more; I can hold my bottle of water and its feeling a little stronger.   He was compliant with home exercise program given last session.   Response to previous " treatment:slightly less pain   Functional change: none     Pain: 3/10  Location: right fingers dorsal, sore, tender, hurts       OBJECTIVE   Update POC on 10/10/24     Objective Measures updated at progress report unless specified.    Observation: Mr Uriarte is a 48-year-old male who has polymyositis almost a scleroderma picture with contractures involving both hands left greater than right.  He had almost  disuse of his hands bilaterally he requires constant aid and  help;  he has also wheelchair-bound     Edema. Measured in centimeters.    7/10/2024 9/6/24 9/24/24       Right     Proximal Wrist Crease 14 cm  =    Proximal Palmar Crease        MCPs 17 cm  16.6 (-0.4) 16.4 (-0.2)         Hand ROM. Measured in degrees.  PIPs fused       7/10/2024 9/6/24 9/24/24*       right Right            Index: MP  54/NT 58/65 =              PIP     Fused 25* flex Fused 25*               DIP 27/27 25/25 35/35              GONZALEZ               Long:  MP 50/NT 52/60 50/65              PIP Fused 25* flex = =              DIP 30/30 30/40 35/40              GONZALEZ               Ring:   MP 55/NT 55/58 50/65              PIP Partially fused 30* = =               DIP 40/40 40/55 38/52              GONZALEZ               Small:  MP 40/NT 35/40 26/40               PIP Fused 25* = =               DIP 45/45 45/55 52/55              GONZALEZ               Thumb: MP 55/62  48/65                IP 0/0  0/0       Rad ADD/ABD nt  45       Pal ADD/ABD nt  45          Opposition nt     Passive MP's ROM  40-70; Opposition to LF, 5 cm opening from index to thumb.     Wrist ROM limited due to polymyositis   Wrist ext/flex 18 / 24   Sup/pro 50 / wfl   No significant RD/UD      Sensation- intact          Strength (Dyanmometer) and Pinch Strength (Pinch Gauge)  Measured in pounds and psi. Average of three trials.    7/10/2024 7/10/2024     Right  Left    Rung II NT NT   Key Pinch NT NT   3pt Pinch NT NT   2pt Pinch NT NT      Treatment     Jethro received the treatments  "listed below:     Supervised modalities  for 10 min after being cleared for contradictions: Paraffin bath -  poured paraffin into bag and placed on top of hand as it was too hot to don; wrapped in towel  to maintain heat application to decrease joint stiffness and improve flexibilityand placed on therabar to maintain MP's in extension; monitored for pain/redness.      Therapeutic Activities to improve functional performance for 20 minutes, including:  - Gentle composite active MP flexion and passive MP extension with   - Place and hold for MP extension for digits 2-3 x 3-5 sec x 10 reps  - place and hold for individual digit's 2-3 extension x 3-5 sec x 10 reps each   - Active thumb flexion with passive abduction/extension , 3 sec hold, repeat  - 10 g finger wt to thumb for ab/ad and thumb extension with 3 sec hold x 10 reps   - Passive digit ab/ad for digits 2-3 x 15 reps each (NT)  - - 2# and 4# clothespin for key pinch x 20 reps with coban for easier grasp.   - education on positioning at home for LLPS in MP extension utilizing a towel or small ball.   --- able to grossly grasp 1.5 " PVC pipe for gross  into red putty and putty drags x 10 reps for /hand strengthening   - added soft red  putty in clinic for gross lumbrical  and  thumb adduction x 10 reps        Manual therapy techniques: for 10 min (including Joint mobilizations, Myofacial release, Manual Lymphatic Drainage, Soft tissue Mobilization, and Friction Massage)  were applied to the right hand  as follows:   - scar massage with mini massager and by hand   - STM to incision areas and finger flexors for digits 1-3  -- Gentle passive stretch to thumb to increase MP extension/abduction   - added gentle passive supination and wrist flex/ext  to improve wrist rotation and flexibility to hold game controller    Patient Education and Home Exercises      Education provided:   - Cont HEP   - Per above   - Progress towards goals   - orthotic check  - " padding application for custom orthosis for finger spacers - the patient was sent home with materials this date due to labor day holiday impacting scheduling.     Written Home Exercises Provided: Patient instructed to cont prior HEP.  Exercises were reviewed and Jethro was able to demonstrate them prior to the end of the session.  Jethro demonstrated good  understanding of the HEP provided. See EMR under Patient Instructions for exercises provided during therapy sessions.       Assessment     Jethro is progressing well towards his goals and there are no updates to goals at this time. Pt prognosis is Fair to Good. Strength continues to progress for functional use of hand.Will progress as able.  Patient requires further visits to improve performance of activities of daily living.     Pt will continue to benefit from skilled outpatient occupational therapy to address the deficits listed in the problem list on initial evaluation provide pt/family education and to maximize pt's level of independence in the home and community environment.     Pt's spiritual, cultural and educational needs considered and pt agreeable to plan of care and goals.    Anticipated barriers to occupational therapy: none     The following goals were discussed with the patient and patient is in agreement with them as to be addressed in the treatment plan.      Short Term Goals: (6 weeks):  1)  Patient to be IND with HEP and modalities for pain management. MET  2)  Increase ROM 3-10 degrees to increase functional hand use for ADLs/work/leisure activities- met  3)   Decrease edema .1-.5mm to increase joint mobility /flexibility for functional hand use. - met     Long Term Goals :To be met by discharge (12 weeks)   1) Patient to be able to grasp game controller and operate thumb control by discharge. Partially met ([employer]cast controller only)   2)   Increase ROM 11-20 degrees to increase functional hand use for ADLs/work/leisure activities  3)  Patient to  report improved functional use of hand > 40% as compared to prior to surgery.   Plan   Certification Period/Plan of care expiration: 7/10/2024 to 10/10/2024.    KIALEY Lobo/L

## 2024-10-16 ENCOUNTER — TELEPHONE (OUTPATIENT)
Dept: ORTHOPEDICS | Facility: CLINIC | Age: 49
End: 2024-10-16
Payer: OTHER GOVERNMENT

## 2024-10-18 ENCOUNTER — OFFICE VISIT (OUTPATIENT)
Dept: ORTHOPEDICS | Facility: CLINIC | Age: 49
End: 2024-10-18
Payer: OTHER GOVERNMENT

## 2024-10-18 DIAGNOSIS — Z98.890 POST-OPERATIVE STATE: Primary | ICD-10-CM

## 2024-10-18 DIAGNOSIS — M24.549 CONTRACTURE OF JOINT OF FINGER, UNSPECIFIED LATERALITY: ICD-10-CM

## 2024-10-18 PROCEDURE — 99213 OFFICE O/P EST LOW 20 MIN: CPT | Mod: PBBFAC | Performed by: SPECIALIST/TECHNOLOGIST

## 2024-10-18 PROCEDURE — 99999 PR PBB SHADOW E&M-EST. PATIENT-LVL III: CPT | Mod: PBBFAC,,, | Performed by: SPECIALIST/TECHNOLOGIST

## 2024-10-18 NOTE — PROGRESS NOTES
10/18/24  Patient reports 18 weeks status post right 2nd 3rd 4th and 5th MCP J arthroplasties, right index long and small finger PIP volar plate release and fusion with capsulotomy and boutonniere deformity of the thumb correction and closed reduction PIP joint fusion of the ring finger.  Reports that he is in no pain.  He continues with regular therapy.  He states that he has not played his video games yet.  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  States this consists of a lot of computer work.  He is interested in having the left side done in January.    9/6/24  Patient is 12 weeks status post right 2nd 3rd 4th and 5th MCP J arthroplasties, right index long and small finger PIP volar plate release and fusion with capsulotomy and boutonniere deformity of the thumb correction and closed reduction PIP joint fusion of the ring finger.  He has also 2 weeks status post hardware removal of the ring finger.  He notes that he is in minimal pain.  He reports he has been attending therapy regularly.  He denies any numbness or tingling.  He denies any fever chills or sweats since time of injury    8/7/24  Patient reports 6 weeks status postR 2nd, 3rd, 4th, and 5th MCPJ Arthroplasties, R Index, Long and Small Finger PIP Volar Plate Releasae and Fusion with capsulotomy, Boutieniere Deformity of Thumb Correction and Closed Reduction PIPJ Fusion of Ring Finger reports that he is in minimal pain.  Denies any fever chills or sweats since surgery.  He has been attending therapy regularly.    7/10/24  Mr. Uriarte is here today for a post-operative visit.  He is 14 days status post R 2nd, 3rd, 4th, and 5th MCPJ Arthroplasties, R Index, Long and Small Finger PIP Volar Plate Releasae and Fusion with capsulotomy, Boutieniere Deformity of Thumb Correction and Closed Reduction PIPJ Fusion of Ring Finger  by Dr. Lucas on 6/26/24. He reports that he is pain is minimal. He states he is  taking Tylenol and Ibuprofen for pain. He was made a custom orthosis that he has been taking off during the day as well as nighttime.  He denies fever, chills, and sweats since the time of the surgery.     Physical exam:    Vitals:    10/18/24 1320   PainSc: 0-No pain       Vital signs are stable, patient is afebrile.  Patient is well dressed and well groomed, no acute distress.  Alert and oriented to person, place, and time.  Post op dressing taken down.  Incision is clean, dry and intact.  There is no erythema or exudate.  There is no sign of any infection. He is NVI.  Extensor lag of MCPs noted.  Able to abduct fingers. Thumb opposition to Index Finger.    Assessment:  s/p R 2nd, 3rd, 4th, and 5th MCPJ Arthroplasties, R Index, Long and Small Finger PIP Volar Plate Releasae and Fusion with capsulotomy, Elye Deformity of Thumb Correction and Closed Reduction PIPJ Fusion of Ring Finger         Plan:  Jethro was seen today for post-op evaluation.    Diagnoses and all orders for this visit:    Post-operative state    Contracture of joint of finger, unspecified laterality          Continue with regular therapy  Patient can progress his strengthening  Patient we will follow up in clinic in December with Dr Rosa to plan for the Left Side.

## 2024-10-22 ENCOUNTER — CLINICAL SUPPORT (OUTPATIENT)
Dept: REHABILITATION | Facility: HOSPITAL | Age: 49
End: 2024-10-22
Payer: OTHER GOVERNMENT

## 2024-10-22 DIAGNOSIS — M25.441 SWELLING OF HAND JOINT, RIGHT: ICD-10-CM

## 2024-10-22 DIAGNOSIS — M25.60 RANGE OF MOTION DEFICIT: ICD-10-CM

## 2024-10-22 DIAGNOSIS — M79.641 HAND PAIN, RIGHT: Primary | ICD-10-CM

## 2024-10-22 PROCEDURE — 97018 PARAFFIN BATH THERAPY: CPT

## 2024-10-22 PROCEDURE — 97140 MANUAL THERAPY 1/> REGIONS: CPT

## 2024-10-22 PROCEDURE — 97530 THERAPEUTIC ACTIVITIES: CPT

## 2024-10-29 ENCOUNTER — CLINICAL SUPPORT (OUTPATIENT)
Dept: REHABILITATION | Facility: HOSPITAL | Age: 49
End: 2024-10-29
Payer: OTHER GOVERNMENT

## 2024-10-29 DIAGNOSIS — M25.441 SWELLING OF HAND JOINT, RIGHT: ICD-10-CM

## 2024-10-29 DIAGNOSIS — M79.641 HAND PAIN, RIGHT: Primary | ICD-10-CM

## 2024-10-29 DIAGNOSIS — M25.60 RANGE OF MOTION DEFICIT: ICD-10-CM

## 2024-10-29 PROCEDURE — 97530 THERAPEUTIC ACTIVITIES: CPT

## 2024-10-29 PROCEDURE — 97140 MANUAL THERAPY 1/> REGIONS: CPT

## 2024-10-29 PROCEDURE — 97018 PARAFFIN BATH THERAPY: CPT

## 2024-10-29 NOTE — PROGRESS NOTES
"[facility]  Occupational Therapy Treatment & Progress Note    Date: 10/29/2024  Name: Jethro Uriarte III  Clinic Number: [id number]    Therapy Diagnosis: R MP capsulotomies/arthroplasties, PIP fusions and PIn removal 6/26/2024 & 8/21/24       Encounter Diagnoses   Name Primary?    Hand pain, right Yes    Range of motion deficit      Swelling of hand joint, right           Medical Diagnosis: Right MP capsulotomies and arthroplasties to all digits; PIP joint fusions digits 2, 3, 5; boutonierre deformity correction thumb, right      ICD-10:   Z98.890 (ICD-10-CM) - Post-operative state   M24.549 (ICD-10-CM) - Contracture of joint of finger, unspecified laterality         Physician: Mahesh Alcala PA-C; Dr. SUMAN Palmer   Physician Orders: Eval and Treat    "POSTOPERATIVE PLAN:  He should keep the dressings in place until his clinic visit in 2 weeks at which time the suture will be removed." - Referring MD surgical report from 08/21/2024     Surgical Procedure and Date: 6/26/24   RIGHT 2ND, 3RD, 4TH AND 5TH MCPJ SOFT TISSUE (WITH CAPSULOTOMIES) AND BALANCING WITH ARTHTOPLASTIES (Right)  INDEX, LONG, AND SMALL FINGER PIP VOLAR PLATE RELEAE AND FUSION WITH CAPSULOTOMY (Right)  BOUTIENIERE DEFORMITY OF THUMB CORRECTION (Right)  CLOSED REDUCTION, PIP JOINT, ARTICULAR, RING FINGER     Evaluation Date: 7/10/2024     Plan of Care Certification Period: 12/31/24     Date of Return to MD: 12/3/24     Visit # / Visits authorized: 21/29  Insurance Authorization Period Expiration: 1/22/25     FOTO #1: FSM___nt__ / GOAL ___nt__     Precautions:  Standard and Weightbearing; physical disabilities      Time In: 1120  Time Out: 1205  Total Appointment Time (timed & untimed codes): 45 minutes    SUBJECTIVE     Pt reports: Still having trouble with finger push with index on controller, I'm looking at different type of controller.  Im able to do a little more; I can hold my bottle of water and its feeling a little " stronger.   He was compliant with home exercise program given last session.   Response to previous treatment:slightly less pain   Functional change: none     Pain: 3/10  Location: right fingers dorsal, sore, tender, hurts       OBJECTIVE     Objective Measures updated at progress report unless specified.    Observation: Mr Uriarte is a 48-year-old male who has polymyositis almost a scleroderma picture with contractures involving both hands left greater than right.  He had almost  disuse of his hands bilaterally he requires constant aid and  help;  he has also wheelchair-bound     Edema. Measured in centimeters.    7/10/2024 9/6/24 9/24/24       Right     Proximal Wrist Crease 14 cm  =    Proximal Palmar Crease        MCPs 17 cm  16.6 (-0.4) 16.4 (-0.2)         Hand ROM. Measured in degrees.  PIPs fused       7/10/2024 9/6/24 9/24/24*       right Right            Index: MP  54/NT 58/65 =              PIP     Fused 25* flex Fused 25*               DIP 27/27 25/25 35/35              GONZALEZ               Long:  MP 50/NT 52/60 50/65              PIP Fused 25* flex = =              DIP 30/30 30/40 35/40              GONZALEZ               Ring:   MP 55/NT 55/58 50/65              PIP Partially fused 30* = =               DIP 40/40 40/55 38/52              GONZALEZ               Small:  MP 40/NT 35/40 26/40               PIP Fused 25* = =               DIP 45/45 45/55 52/55              GONZALEZ               Thumb: MP 55/62  48/65                IP 0/0  0/0       Rad ADD/ABD nt  45       Pal ADD/ABD nt  45          Opposition nt     Passive MP's ROM  40-70; Opposition to LF, 5 cm opening from index to thumb.     Wrist ROM limited due to polymyositis   Wrist ext/flex 18 / 24   Sup/pro 50 / wfl   No significant RD/UD      Sensation- intact          Strength (Dyanmometer) and Pinch Strength (Pinch Gauge)  Measured in pounds and psi. Average of three trials.          Right  Left    Rung II NT NT   Key Pinch NT NT   3pt Pinch NT NT   2pt Pinch  "NT NT      Treatment     Jethro received the treatments listed below:     Supervised modalities  for 10 min after being cleared for contradictions: Paraffin bath -  poured paraffin into bag and placed on hand in bag; wrapped in towel  to maintain heat application to decrease joint stiffness and improve flexibilityand placed on therabar to maintain MP's in extension; monitored for pain/redness.      Therapeutic Activities to improve functional performance for 20 minutes, including:  - Gentle composite active MP flexion and  extension with   - Place and hold for MP extension for digits 2-3 x 3-5 sec x 10 reps  - place and hold for individual digit's 2-3 extension x 3-5 sec x 10 reps each   - Active thumb flexion with passive abduction/extension , 3 sec hold, repeat  - 10 g finger wt to thumb for ab/ad and thumb extension with 3 sec hold x 10 reps   - Passive digit ab/ad for digits 2-3 x 15 reps each (NT)  - -  4# clothespin for key pinch x 20 reps with coban for easier grasp.   - education on positioning at home for LLPS in MP extension utilizing a towel or small ball.   --- able to grossly grasp 1.5 " PVC pipe for gross  into red putty and putty drags x 10 reps for /hand strengthening   - added soft red  putty in clinic for gross lumbrical  and  thumb adduction x 10 reps        Manual therapy techniques: for 10 min (including Joint mobilizations, Myofacial release, Manual Lymphatic Drainage, Soft tissue Mobilization, and Friction Massage)  were applied to the right hand  as follows:   -- Gentle passive stretch to thumb to increase MP extension/abduction   - added gentle passive supination and wrist flex/ext  to improve wrist rotation and flexibility to hold game controller    Patient Education and Home Exercises      Education provided:   - Cont HEP   - Per above   - Progress towards goals   - orthotic check  - padding application for custom orthosis for finger spacers - the patient was sent home with " materials this date due to labor day holiday impacting scheduling.     Written Home Exercises Provided: Patient instructed to cont prior HEP.  Exercises were reviewed and Jethro was able to demonstrate them prior to the end of the session.  Jethro demonstrated good  understanding of the HEP provided. See EMR under Patient Instructions for exercises provided during therapy sessions.       Assessment     Jethro is progressing well towards his goals and there are no updates to goals at this time. Pt prognosis is Fair to Good. Strength continues to progress for functional use of hand.Will progress as able.  Patient requires further visits to improve performance of activities of daily living.     Pt will continue to benefit from skilled outpatient occupational therapy to address the deficits listed in the problem list on initial evaluation provide pt/family education and to maximize pt's level of independence in the home and community environment.     Pt's spiritual, cultural and educational needs considered and pt agreeable to plan of care and goals.    Anticipated barriers to occupational therapy: none     The following goals were discussed with the patient and patient is in agreement with them as to be addressed in the treatment plan.      Short Term Goals: (6 weeks):  1)  Patient to be IND with HEP and modalities for pain management. MET  2)  Increase ROM 3-10 degrees to increase functional hand use for ADLs/work/leisure activities- met  3)   Decrease edema .1-.5mm to increase joint mobility /flexibility for functional hand use. - met     Long Term Goals :To be met by discharge (12 weeks)   1) Patient to be able to grasp game controller and operate thumb control by discharge. Partially met (dreamcast controller only)   2)   Increase ROM 11-20 degrees to increase functional hand use for ADLs/work/leisure activities  3)  Patient to report improved functional use of hand > 40% as compared to prior to surgery.   Plan    Certification Period/Plan of care expiration: 12/31/24    Meghan Peterson, SAGER/KENZIE, CHT

## 2024-11-05 ENCOUNTER — CLINICAL SUPPORT (OUTPATIENT)
Dept: REHABILITATION | Facility: HOSPITAL | Age: 49
End: 2024-11-05
Payer: OTHER GOVERNMENT

## 2024-11-05 DIAGNOSIS — M79.641 HAND PAIN, RIGHT: Primary | ICD-10-CM

## 2024-11-05 DIAGNOSIS — M25.60 RANGE OF MOTION DEFICIT: ICD-10-CM

## 2024-11-05 DIAGNOSIS — M25.441 SWELLING OF HAND JOINT, RIGHT: ICD-10-CM

## 2024-11-05 PROCEDURE — 97110 THERAPEUTIC EXERCISES: CPT

## 2024-11-05 PROCEDURE — 97140 MANUAL THERAPY 1/> REGIONS: CPT

## 2024-11-05 PROCEDURE — 97018 PARAFFIN BATH THERAPY: CPT

## 2024-11-05 NOTE — PROGRESS NOTES
"[facility]  Occupational Therapy Treatment & Progress Note    Date: 11/5/2024  Name: Jethro Uriarte III  Clinic Number: [id number]    Therapy Diagnosis: R MP capsulotomies/arthroplasties, PIP fusions and PIn removal 6/26/2024 & 8/21/24       Encounter Diagnoses   Name Primary?    Hand pain, right Yes    Range of motion deficit      Swelling of hand joint, right           Medical Diagnosis: Right MP capsulotomies and arthroplasties to all digits; PIP joint fusions digits 2, 3, 5; boutonierre deformity correction thumb, right      ICD-10:   Z98.890 (ICD-10-CM) - Post-operative state   M24.549 (ICD-10-CM) - Contracture of joint of finger, unspecified laterality         Physician: Mahesh Alcala PA-C; Dr. SUMAN Palmer   Physician Orders: Eval and Treat    "POSTOPERATIVE PLAN:  He should keep the dressings in place until his clinic visit in 2 weeks at which time the suture will be removed." - Referring MD surgical report from 08/21/2024     Surgical Procedure and Date: 6/26/24   RIGHT 2ND, 3RD, 4TH AND 5TH MCPJ SOFT TISSUE (WITH CAPSULOTOMIES) AND BALANCING WITH ARTHTOPLASTIES (Right)  INDEX, LONG, AND SMALL FINGER PIP VOLAR PLATE RELEAE AND FUSION WITH CAPSULOTOMY (Right)  BOUTIENIERE DEFORMITY OF THUMB CORRECTION (Right)  CLOSED REDUCTION, PIP JOINT, ARTICULAR, RING FINGER     Evaluation Date: 7/10/2024     Plan of Care Certification Period: 12/31/24     Date of Return to MD: 10/18/24     Visit # / Visits authorized: 22/29  Insurance Authorization Period Expiration: 1/22/25     FOTO #1: FSM___nt__ / GOAL ___nt__     Precautions:  Standard and Weightbearing; physical disabilities      Time In: 1115  Time Out: 12  Total Appointment Time (timed & untimed codes): 45 minutes    SUBJECTIVE     Pt reports: Still having trouble with finger push with index on controller, I'm looking at different type of controller.  Im able to do a little more; I can hold my bottle of water and its feeling a little " stronger.   He was compliant with home exercise program given last session.   Response to previous treatment:slightly less pain   Functional change: none     Pain: 3/10  Location: right fingers dorsal, sore, tender, hurts       OBJECTIVE     Objective Measures updated at progress report unless specified.    Observation: Mr Uriarte is a 48-year-old male who has polymyositis almost a scleroderma picture with contractures involving both hands left greater than right.  He had almost  disuse of his hands bilaterally he requires constant aid and  help;  he has also wheelchair-bound     Edema. Measured in centimeters.    7/10/2024 9/6/24 9/24/24       Right     Proximal Wrist Crease 14 cm  =    Proximal Palmar Crease        MCPs 17 cm  16.6 (-0.4) 16.4 (-0.2)         Hand ROM. Measured in degrees.  PIPs fused       7/10/2024 9/6/24 9/24/24*       right Right            Index: MP  54/NT 58/65 =              PIP     Fused 25* flex Fused 25*               DIP 27/27 25/25 35/35              GONZALEZ               Long:  MP 50/NT 52/60 50/65              PIP Fused 25* flex = =              DIP 30/30 30/40 35/40              GONZALEZ               Ring:   MP 55/NT 55/58 50/65              PIP Partially fused 30* = =               DIP 40/40 40/55 38/52              GONZALEZ               Small:  MP 40/NT 35/40 26/40               PIP Fused 25* = =               DIP 45/45 45/55 52/55              GONZALEZ               Thumb: MP 55/62  48/65                IP 0/0  0/0       Rad ADD/ABD nt  45       Pal ADD/ABD nt  45          Opposition nt     Passive MP's ROM  40-70; Opposition to LF, 5 cm opening from index to thumb.     Wrist ROM limited due to polymyositis   Wrist ext/flex 18 / 24   Sup/pro 50 / wfl   No significant RD/UD      Sensation- intact          Strength (Dyanmometer) and Pinch Strength (Pinch Gauge)  Measured in pounds and psi. Average of three trials.          Right  Left    Rung II NT NT   Key Pinch NT NT   3pt Pinch NT NT   2pt Pinch  "NT NT      Treatment     Jethro received the treatments listed below:     Supervised modalities  for 10 min after being cleared for contradictions: Paraffin bath -  poured paraffin into bag and placed on hand in bag; wrapped in towel  to maintain heat application to decrease joint stiffness and improve flexibilityand placed on therabar to maintain MP's in extension; monitored for pain/redness.      Therapeutic Activities to improve functional performance for 20 minutes, including:  - Gentle composite active MP flexion and passive MP extension with   - Place and hold for MP extension for digits 2-3 x 3-5 sec x 10 reps  - place and hold for individual digit's 2-3 extension x 3-5 sec x 10 reps each   - Active thumb flexion with passive abduction/extension , 3 sec hold, repeat  - 10 g finger wt to thumb for ab/ad and thumb extension with 3 sec hold x 10 reps   - Passive digit ab/ad for digits 2-3 x 15 reps each (NT)  - -  4# clothespin for key pinch x 20 reps with coban for easier grasp.   - education on positioning at home for LLPS in MP extension utilizing a towel or small ball.   --- able to grossly grasp 1.5 " PVC pipe for gross  into red putty and putty drags x 10 reps for /hand strengthening   - added soft red  putty in clinic for gross lumbrical  and  thumb adduction x 10 reps   - digit extension, pom pom push       Manual therapy techniques: for 10 min (including Joint mobilizations, Myofacial release, Manual Lymphatic Drainage, Soft tissue Mobilization, and Friction Massage)  were applied to the right hand  as follows:   -- Gentle passive stretch to thumb to increase MP extension/abduction   - added gentle passive supination and wrist flex/ext  to improve wrist rotation and flexibility to hold game controller  - ktape for digit extension    Patient Education and Home Exercises      Education provided:   - Cont HEP   - Per above   - Progress towards goals   - orthotic check  - padding application for " custom orthosis for finger spacers - the patient was sent home with materials this date due to labor day holiday impacting scheduling.     Written Home Exercises Provided: Patient instructed to cont prior HEP.  Exercises were reviewed and Jethro was able to demonstrate them prior to the end of the session.  Jethro demonstrated good  understanding of the HEP provided. See EMR under Patient Instructions for exercises provided during therapy sessions.       Assessment       Pt continues to have weakness in extension and with significant stiffness. He responds well to ktape for mild facilitation of mcp extension however limited ROM is noted. Plan to progress as tolerated.     Jethro is progressing well towards his goals and there are no updates to goals at this time. Pt prognosis is Fair to Good.  Patient requires further visits to improve performance of activities of daily living.     Pt will continue to benefit from skilled outpatient occupational therapy to address the deficits listed in the problem list on initial evaluation provide pt/family education and to maximize pt's level of independence in the home and community environment.     Pt's spiritual, cultural and educational needs considered and pt agreeable to plan of care and goals.    Anticipated barriers to occupational therapy: none     The following goals were discussed with the patient and patient is in agreement with them as to be addressed in the treatment plan.      Short Term Goals: (6 weeks):  1)  Patient to be IND with HEP and modalities for pain management. MET  2)  Increase ROM 3-10 degrees to increase functional hand use for ADLs/work/leisure activities- met  3)   Decrease edema .1-.5mm to increase joint mobility /flexibility for functional hand use. - met     Long Term Goals :To be met by discharge (12 weeks)   1) Patient to be able to grasp game controller and operate thumb control by discharge. Partially met (dreamcast controller only)   2)    Increase ROM 11-20 degrees to increase functional hand use for ADLs/work/leisure activities  3)  Patient to report improved functional use of hand > 40% as compared to prior to surgery.   Plan   Certification Period/Plan of care expiration: 7/10/2024 to 10/10/2024.    Carmen Zamora OTR/L

## 2024-11-12 ENCOUNTER — CLINICAL SUPPORT (OUTPATIENT)
Dept: REHABILITATION | Facility: HOSPITAL | Age: 49
End: 2024-11-12
Payer: OTHER GOVERNMENT

## 2024-11-12 DIAGNOSIS — M25.441 SWELLING OF HAND JOINT, RIGHT: ICD-10-CM

## 2024-11-12 DIAGNOSIS — M79.641 HAND PAIN, RIGHT: Primary | ICD-10-CM

## 2024-11-12 DIAGNOSIS — M25.60 RANGE OF MOTION DEFICIT: ICD-10-CM

## 2024-11-12 PROCEDURE — 97018 PARAFFIN BATH THERAPY: CPT

## 2024-11-12 PROCEDURE — 97110 THERAPEUTIC EXERCISES: CPT

## 2024-11-12 PROCEDURE — 97140 MANUAL THERAPY 1/> REGIONS: CPT

## 2024-11-13 NOTE — PROGRESS NOTES
"[facility]  Occupational Therapy Treatment & Progress Note    Date: 11/12/2024  Name: Jethro Uriarte III  Clinic Number: [id number]    Therapy Diagnosis: R MP capsulotomies/arthroplasties, PIP fusions and PIn removal 6/26/2024 & 8/21/24       Encounter Diagnoses   Name Primary?    Hand pain, right Yes    Range of motion deficit      Swelling of hand joint, right           Medical Diagnosis: Right MP capsulotomies and arthroplasties to all digits; PIP joint fusions digits 2, 3, 5; boutonierre deformity correction thumb, right      ICD-10:   Z98.890 (ICD-10-CM) - Post-operative state   M24.549 (ICD-10-CM) - Contracture of joint of finger, unspecified laterality         Physician: Mahesh Alcala PA-C; Dr. SUMAN Palmer   Physician Orders: Eval and Treat    "POSTOPERATIVE PLAN:  He should keep the dressings in place until his clinic visit in 2 weeks at which time the suture will be removed." - Referring MD surgical report from 08/21/2024     Surgical Procedure and Date: 6/26/24   RIGHT 2ND, 3RD, 4TH AND 5TH MCPJ SOFT TISSUE (WITH CAPSULOTOMIES) AND BALANCING WITH ARTHTOPLASTIES (Right)  INDEX, LONG, AND SMALL FINGER PIP VOLAR PLATE RELEAE AND FUSION WITH CAPSULOTOMY (Right)  BOUTIENIERE DEFORMITY OF THUMB CORRECTION (Right)  CLOSED REDUCTION, PIP JOINT, ARTICULAR, RING FINGER     Evaluation Date: 7/10/2024     Plan of Care Certification Period: 12/31/24     Date of Return to MD: 10/18/24     Visit # / Visits authorized: 22/29  Insurance Authorization Period Expiration: 1/22/25     FOTO #1: FSM___nt__ / GOAL ___nt__     Precautions:  Standard and Weightbearing; physical disabilities      Time In: 1115  Time Out: 12  Total Appointment Time (timed & untimed codes): 45 minutes    SUBJECTIVE     Pt reports: He is able to do more independently for  such as opening bottles and holding items  He was compliant with home exercise program given last session.   Response to previous treatment:slightly less " pain   Functional change: none     Pain: 3/10  Location: right fingers dorsal, sore, tender, hurts       OBJECTIVE     Objective Measures updated at progress report unless specified.    Observation: Mr Uriarte is a 48-year-old male who has polymyositis almost a scleroderma picture with contractures involving both hands left greater than right.  He had almost  disuse of his hands bilaterally he requires constant aid and  help;  he has also wheelchair-bound     Edema. Measured in centimeters.    7/10/2024 9/6/24 9/24/24       Right     Proximal Wrist Crease 14 cm  =    Proximal Palmar Crease        MCPs 17 cm  16.6 (-0.4) 16.4 (-0.2)         Hand ROM. Measured in degrees.  PIPs fused       7/10/2024 9/6/24 9/24/24*       right Right            Index: MP  54/NT 58/65 =              PIP     Fused 25* flex Fused 25*               DIP 27/27 25/25 35/35              GONZALEZ               Long:  MP 50/NT 52/60 50/65              PIP Fused 25* flex = =              DIP 30/30 30/40 35/40              GONZALEZ               Ring:   MP 55/NT 55/58 50/65              PIP Partially fused 30* = =               DIP 40/40 40/55 38/52              GONZALEZ               Small:  MP 40/NT 35/40 26/40               PIP Fused 25* = =               DIP 45/45 45/55 52/55              GONZALEZ               Thumb: MP 55/62  48/65                IP 0/0  0/0       Rad ADD/ABD nt  45       Pal ADD/ABD nt  45          Opposition nt     Passive MP's ROM  40-70; Opposition to LF, 5 cm opening from index to thumb.     Wrist ROM limited due to polymyositis   Wrist ext/flex 18 / 24   Sup/pro 50 / wfl   No significant RD/UD      Sensation- intact          Strength (Dyanmometer) and Pinch Strength (Pinch Gauge)  Measured in pounds and psi. Average of three trials.          Right  Left    Rung II NT NT   Key Pinch NT NT   3pt Pinch NT NT   2pt Pinch NT NT      Treatment     Jethro received the treatments listed below:     Supervised modalities  for 10 min after being  "cleared for contradictions: Paraffin bath -  poured paraffin into bag and placed on hand in bag; wrapped in towel  to maintain heat application to decrease joint stiffness and improve flexibilityand placed on therabar to maintain MP's in extension; monitored for pain/redness.      Therapeutic Activities to improve functional performance for 20 minutes, including:  - Gentle composite active MP flexion and passive MP extension with   - Place and hold for MP extension for digits 2-3 x 3-5 sec x 10 reps  - place and hold for individual digit's 2-3 extension x 3-5 sec x 10 reps each   - Active thumb flexion with passive abduction/extension , 3 sec hold, repeat  - 10 g finger wt to thumb for ab/ad and thumb extension with 3 sec hold x 10 reps   - Passive digit ab/ad for digits 2-3 x 15 reps each (NT)  - -  4# clothespin for key pinch x 20 reps with coban for easier grasp.   - education on positioning at home for LLPS in MP extension utilizing a towel or small ball.   --- able to grossly grasp 1.5 " PVC pipe for gross  into red putty and putty drags x 10 reps for /hand strengthening   - added soft red  putty in clinic for gross lumbrical  and  thumb adduction x 10 reps   - digit extension, pom pom push  KT tape to dorsal digits    - digit abd/add pom pom       Manual therapy techniques: for 10 min (including Joint mobilizations, Myofacial release, Manual Lymphatic Drainage, Soft tissue Mobilization, and Friction Massage)  were applied to the right hand  as follows:   -- Gentle passive stretch to thumb to increase MP extension/abduction   - added gentle passive supination and wrist flex/ext  to improve wrist rotation and flexibility to hold game controller  - ktape for digit extension    Patient Education and Home Exercises      Education provided:   - Cont HEP   - Per above   - Progress towards goals   - orthotic check  - padding application for custom orthosis for finger spacers - the patient was sent " home with materials this date due to labor day holiday impacting scheduling.     Written Home Exercises Provided: Patient instructed to cont prior HEP.  Exercises were reviewed and Jethro was able to demonstrate them prior to the end of the session.  Jethro demonstrated good  understanding of the HEP provided. See EMR under Patient Instructions for exercises provided during therapy sessions.       Assessment       Pt continues to demo good effort with digit extension. Index with fair control and isolated extension. Digits 3-5 continue to be limited in extension and isolation.     Jethro is progressing well towards his goals and there are no updates to goals at this time. Pt prognosis is Fair to Good.  Patient requires further visits to improve performance of activities of daily living.     Pt will continue to benefit from skilled outpatient occupational therapy to address the deficits listed in the problem list on initial evaluation provide pt/family education and to maximize pt's level of independence in the home and community environment.     Pt's spiritual, cultural and educational needs considered and pt agreeable to plan of care and goals.    Anticipated barriers to occupational therapy: none     The following goals were discussed with the patient and patient is in agreement with them as to be addressed in the treatment plan.      Short Term Goals: (6 weeks):  1)  Patient to be IND with HEP and modalities for pain management. MET  2)  Increase ROM 3-10 degrees to increase functional hand use for ADLs/work/leisure activities- met  3)   Decrease edema .1-.5mm to increase joint mobility /flexibility for functional hand use. - met     Long Term Goals :To be met by discharge (12 weeks)   1) Patient to be able to grasp game controller and operate thumb control by discharge. Partially met (dreamcast controller only)   2)   Increase ROM 11-20 degrees to increase functional hand use for ADLs/work/leisure activities  3)   Patient to report improved functional use of hand > 40% as compared to prior to surgery.   Plan   Certification Period/Plan of care expiration: 7/10/2024 to 10/10/2024.    KAILEY Lobo/L

## 2024-11-19 ENCOUNTER — CLINICAL SUPPORT (OUTPATIENT)
Dept: REHABILITATION | Facility: HOSPITAL | Age: 49
End: 2024-11-19
Payer: OTHER GOVERNMENT

## 2024-11-19 DIAGNOSIS — M25.441 SWELLING OF HAND JOINT, RIGHT: ICD-10-CM

## 2024-11-19 DIAGNOSIS — M79.641 HAND PAIN, RIGHT: Primary | ICD-10-CM

## 2024-11-19 DIAGNOSIS — M25.60 RANGE OF MOTION DEFICIT: ICD-10-CM

## 2024-11-19 PROCEDURE — 97140 MANUAL THERAPY 1/> REGIONS: CPT

## 2024-11-19 PROCEDURE — 97018 PARAFFIN BATH THERAPY: CPT

## 2024-11-19 PROCEDURE — 97530 THERAPEUTIC ACTIVITIES: CPT

## 2024-11-20 NOTE — PROGRESS NOTES
"[facility]  Occupational Therapy Treatment & Progress Note    Date: 11/19/2024  Name: Jethro Uriarte III  Clinic Number: [id number]    Therapy Diagnosis: R MP capsulotomies/arthroplasties, PIP fusions and PIn removal 6/26/2024 & 8/21/24       Encounter Diagnoses   Name Primary?    Hand pain, right Yes    Range of motion deficit      Swelling of hand joint, right           Medical Diagnosis: Right MP capsulotomies and arthroplasties to all digits; PIP joint fusions digits 2, 3, 5; boutonierre deformity correction thumb, right      ICD-10:   Z98.890 (ICD-10-CM) - Post-operative state   M24.549 (ICD-10-CM) - Contracture of joint of finger, unspecified laterality         Physician: Mahesh Alcala PA-C; Dr. SUMAN Palmer   Physician Orders: Eval and Treat    "POSTOPERATIVE PLAN:  He should keep the dressings in place until his clinic visit in 2 weeks at which time the suture will be removed." - Referring MD surgical report from 08/21/2024     Surgical Procedure and Date: 6/26/24   RIGHT 2ND, 3RD, 4TH AND 5TH MCPJ SOFT TISSUE (WITH CAPSULOTOMIES) AND BALANCING WITH ARTHTOPLASTIES (Right)  INDEX, LONG, AND SMALL FINGER PIP VOLAR PLATE RELEAE AND FUSION WITH CAPSULOTOMY (Right)  BOUTIENIERE DEFORMITY OF THUMB CORRECTION (Right)  CLOSED REDUCTION, PIP JOINT, ARTICULAR, RING FINGER     Evaluation Date: 7/10/2024     Plan of Care Certification Period: 12/31/24     Date of Return to MD: 10/18/24     Visit # / Visits authorized: 22/29  Insurance Authorization Period Expiration: 1/22/25     FOTO #1: FSM___nt__ / GOAL ___nt__     Precautions:  Standard and Weightbearing; physical disabilities      Time In: 1115  Time Out: 12  Total Appointment Time (timed & untimed codes): 45 minutes    SUBJECTIVE     Pt reports: He is able to do more independently for  such as opening bottles and holding items; looking forward to exploring options for the L hand  He was compliant with home exercise program given last " session.   Response to previous treatment:slightly less pain   Functional change: none     Pain: 3/10  Location: right fingers dorsal, sore, tender, hurts       OBJECTIVE     Objective Measures updated at progress report unless specified.    Observation: Mr Uriarte is a 48-year-old male who has polymyositis almost a scleroderma picture with contractures involving both hands left greater than right.  He had almost  disuse of his hands bilaterally he requires constant aid and  help;  he has also wheelchair-bound     Edema. Measured in centimeters.    7/10/2024 9/6/24 9/24/24       Right     Proximal Wrist Crease 14 cm  =    Proximal Palmar Crease        MCPs 17 cm  16.6 (-0.4) 16.4 (-0.2)         Hand ROM. Measured in degrees.  PIPs fused       7/10/2024 9/6/24 9/24/24*       right Right            Index: MP  54/NT 58/65 =              PIP     Fused 25* flex Fused 25*               DIP 27/27 25/25 35/35              GONZALEZ               Long:  MP 50/NT 52/60 50/65              PIP Fused 25* flex = =              DIP 30/30 30/40 35/40              GONZALEZ               Ring:   MP 55/NT 55/58 50/65              PIP Partially fused 30* = =               DIP 40/40 40/55 38/52              GONZALEZ               Small:  MP 40/NT 35/40 26/40               PIP Fused 25* = =               DIP 45/45 45/55 52/55              GONZALEZ               Thumb: MP 55/62  48/65                IP 0/0  0/0       Rad ADD/ABD nt  45       Pal ADD/ABD nt  45          Opposition nt     Passive MP's ROM  40-70; Opposition to LF, 5 cm opening from index to thumb.     Wrist ROM limited due to polymyositis   Wrist ext/flex 18 / 24   Sup/pro 50 / wfl   No significant RD/UD      Sensation- intact          Strength (Dyanmometer) and Pinch Strength (Pinch Gauge)  Measured in pounds and psi. Average of three trials.          Right  Left    Rung II NT NT   Key Pinch NT NT   3pt Pinch NT NT   2pt Pinch NT NT      Treatment     Jethro received the treatments listed  "below:     Supervised modalities  for 10 min after being cleared for contradictions: Paraffin bath -  poured paraffin into bag and placed on hand in bag; wrapped in towel  to maintain heat application to decrease joint stiffness and improve flexibilityand placed on therabar to maintain MP's in extension; monitored for pain/redness.      Therapeutic Activities to improve functional performance for 20 minutes, including:  - Gentle composite active MP flexion and passive MP extension with   - Place and hold for MP extension for digits 2-3 x 3-5 sec x 10 reps  - place and hold for individual digit's 2-3 extension x 3-5 sec x 10 reps each   - Active thumb flexion with passive abduction/extension , 3 sec hold, repeat  - 10 g finger wt to thumb for ab/ad and thumb extension with 3 sec hold x 10 reps   - Passive digit ab/ad for digits 2-3 x 15 reps each (NT)  - -  4# clothespin for key pinch x 20 reps with coban for easier grasp.   - education on positioning at home for LLPS in MP extension utilizing a towel or small ball.   --- able to grossly grasp 1.5 " PVC pipe for gross  into red putty and putty drags x 10 reps for /hand strengthening   - added soft red  putty in clinic for gross lumbrical  and  thumb adduction x 10 reps   - digit extension, pom pom push  KT tape to dorsal digits    - digit abd/add pom pom       Manual therapy techniques: for 10 min (including Joint mobilizations, Myofacial release, Manual Lymphatic Drainage, Soft tissue Mobilization, and Friction Massage)  were applied to the right hand  as follows:   -- Gentle passive stretch to thumb to increase MP extension/abduction   - added gentle passive supination and wrist flex/ext  to improve wrist rotation and flexibility to hold game controller  - ktape for digit extension    Patient Education and Home Exercises      Education provided:   - Cont HEP   - Per above   - Progress towards goals   - orthotic check  - padding application for " custom orthosis for finger spacers - the patient was sent home with materials this date due to labor day holiday impacting scheduling.     Written Home Exercises Provided: Patient instructed to cont prior HEP.  Exercises were reviewed and Jethro was able to demonstrate them prior to the end of the session.  Jethro demonstrated good  understanding of the HEP provided. See EMR under Patient Instructions for exercises provided during therapy sessions.       Assessment       Good effort is noted with attention to active digit extension. He responds well with improved endurance when ktape is applied to dorsum of hand and fingers for assisted extension. Index digit is noted to have best strength and control in flexion and extension. Digits 3-5 continue to be limited in extension and isolation.     Jethro is progressing well towards his goals and there are no updates to goals at this time. Pt prognosis is Fair to Good.  Patient requires further visits to improve performance of activities of daily living.     Pt will continue to benefit from skilled outpatient occupational therapy to address the deficits listed in the problem list on initial evaluation provide pt/family education and to maximize pt's level of independence in the home and community environment.     Pt's spiritual, cultural and educational needs considered and pt agreeable to plan of care and goals.    Anticipated barriers to occupational therapy: none     The following goals were discussed with the patient and patient is in agreement with them as to be addressed in the treatment plan.      Short Term Goals: (6 weeks):  1)  Patient to be IND with HEP and modalities for pain management. MET  2)  Increase ROM 3-10 degrees to increase functional hand use for ADLs/work/leisure activities- met  3)   Decrease edema .1-.5mm to increase joint mobility /flexibility for functional hand use. - met     Long Term Goals :To be met by discharge (12 weeks)   1) Patient to be  able to grasp game controller and operate thumb control by discharge. Partially met (dreamcast controller only)   2)   Increase ROM 11-20 degrees to increase functional hand use for ADLs/work/leisure activities  3)  Patient to report improved functional use of hand > 40% as compared to prior to surgery.   Plan   Certification Period/Plan of care expiration: 7/10/2024 to 10/10/2024.    Carmen Zamora, OTR/L

## 2024-12-03 ENCOUNTER — CLINICAL SUPPORT (OUTPATIENT)
Dept: REHABILITATION | Facility: HOSPITAL | Age: 49
End: 2024-12-03
Payer: OTHER GOVERNMENT

## 2024-12-03 ENCOUNTER — OFFICE VISIT (OUTPATIENT)
Dept: ORTHOPEDICS | Facility: CLINIC | Age: 49
End: 2024-12-03
Payer: OTHER GOVERNMENT

## 2024-12-03 VITALS — BODY MASS INDEX: 19.71 KG/M2 | WEIGHT: 130.06 LBS | HEIGHT: 68 IN

## 2024-12-03 DIAGNOSIS — M79.641 HAND PAIN, RIGHT: Primary | ICD-10-CM

## 2024-12-03 DIAGNOSIS — M24.549 CONTRACTURE OF JOINT OF FINGER, UNSPECIFIED LATERALITY: Primary | ICD-10-CM

## 2024-12-03 DIAGNOSIS — M25.60 RANGE OF MOTION DEFICIT: ICD-10-CM

## 2024-12-03 DIAGNOSIS — M25.441 SWELLING OF HAND JOINT, RIGHT: ICD-10-CM

## 2024-12-03 PROCEDURE — 99214 OFFICE O/P EST MOD 30 MIN: CPT | Mod: S$PBB,,, | Performed by: ORTHOPAEDIC SURGERY

## 2024-12-03 PROCEDURE — 99999 PR PBB SHADOW E&M-EST. PATIENT-LVL III: CPT | Mod: PBBFAC,,, | Performed by: ORTHOPAEDIC SURGERY

## 2024-12-03 PROCEDURE — 97530 THERAPEUTIC ACTIVITIES: CPT | Mod: CO

## 2024-12-03 PROCEDURE — 99213 OFFICE O/P EST LOW 20 MIN: CPT | Mod: PBBFAC | Performed by: ORTHOPAEDIC SURGERY

## 2024-12-03 PROCEDURE — 97018 PARAFFIN BATH THERAPY: CPT | Mod: CO

## 2024-12-03 PROCEDURE — 97140 MANUAL THERAPY 1/> REGIONS: CPT | Mod: CO

## 2024-12-03 NOTE — PROGRESS NOTES
"  [facility]  Occupational Therapy Treatment & Progress Note    Date: 12/3/2024  Name: Jethro Uriarte III  Clinic Number: [id number]    Therapy Diagnosis: R MP capsulotomies/arthroplasties, PIP fusions and PIn removal 6/26/2024 & 8/21/24       Encounter Diagnoses   Name Primary?    Hand pain, right Yes    Range of motion deficit      Swelling of hand joint, right           Medical Diagnosis: Right MP capsulotomies and arthroplasties to all digits; PIP joint fusions digits 2, 3, 5; boutonierre deformity correction thumb, right      ICD-10:   Z98.890 (ICD-10-CM) - Post-operative state   M24.549 (ICD-10-CM) - Contracture of joint of finger, unspecified laterality         Physician: Mahesh Alcala PA-C; Dr. SUMAN Palmer   Physician Orders: Eval and Treat    "POSTOPERATIVE PLAN:  He should keep the dressings in place until his clinic visit in 2 weeks at which time the suture will be removed." - Referring MD surgical report from 08/21/2024     Surgical Procedure and Date: 6/26/24   RIGHT 2ND, 3RD, 4TH AND 5TH MCPJ SOFT TISSUE (WITH CAPSULOTOMIES) AND BALANCING WITH ARTHTOPLASTIES (Right)  INDEX, LONG, AND SMALL FINGER PIP VOLAR PLATE RELEAE AND FUSION WITH CAPSULOTOMY (Right)  BOUTIENIERE DEFORMITY OF THUMB CORRECTION (Right)  CLOSED REDUCTION, PIP JOINT, ARTICULAR, RING FINGER     Evaluation Date: 7/10/2024     Plan of Care Certification Period: 12/31/24     Date of Return to MD: 10/18/24     Visit # / Visits authorized: 22/29  Insurance Authorization Period Expiration: 1/22/25     FOTO #1: FSM___nt__ / GOAL ___nt__     Precautions:  Standard and Weightbearing; physical disabilities      Time In: 1115  Time Out: 12  Total Appointment Time (timed & untimed codes): 45 minutes    SUBJECTIVE     Pt reports: He is able to do more independently for  such as opening bottles and holding items; looking forward to exploring options for the L hand      He was compliant with home exercise program given last " session.   Response to previous treatment:slightly less pain   Functional change: none     Pain: 3/10  Location: right fingers dorsal, sore, tender, hurts       OBJECTIVE     Objective Measures updated at progress report unless specified.    Observation: Mr Uriarte is a 48-year-old male who has polymyositis almost a scleroderma picture with contractures involving both hands left greater than right.  He had almost  disuse of his hands bilaterally he requires constant aid and  help;  he has also wheelchair-bound     Edema. Measured in centimeters.    7/10/2024 9/6/24 9/24/24       Right     Proximal Wrist Crease 14 cm  =    Proximal Palmar Crease        MCPs 17 cm  16.6 (-0.4) 16.4 (-0.2)         Hand ROM. Measured in degrees.  PIPs fused       7/10/2024 9/6/24 9/24/24*       right Right            Index: MP  54/NT 58/65 =              PIP     Fused 25* flex Fused 25*               DIP 27/27 25/25 35/35              GONZALEZ               Long:  MP 50/NT 52/60 50/65              PIP Fused 25* flex = =              DIP 30/30 30/40 35/40              GONZALEZ               Ring:   MP 55/NT 55/58 50/65              PIP Partially fused 30* = =               DIP 40/40 40/55 38/52              GONZALEZ               Small:  MP 40/NT 35/40 26/40               PIP Fused 25* = =               DIP 45/45 45/55 52/55              GONZALEZ               Thumb: MP 55/62  48/65                IP 0/0  0/0       Rad ADD/ABD nt  45       Pal ADD/ABD nt  45          Opposition nt     Passive MP's ROM  40-70; Opposition to LF, 5 cm opening from index to thumb.     Wrist ROM limited due to polymyositis   Wrist ext/flex 18 / 24   Sup/pro 50 / wfl   No significant RD/UD      Sensation- intact          Strength (Dyanmometer) and Pinch Strength (Pinch Gauge)  Measured in pounds and psi. Average of three trials.          Right  Left    Rung II NT NT   Key Pinch NT NT   3pt Pinch NT NT   2pt Pinch NT NT      Treatment     Jethro received the treatments listed  "below:     Supervised modalities  for 10 min after being cleared for contradictions: Paraffin bath -  poured paraffin into bag and placed on hand in bag; wrapped in towel  to maintain heat application to decrease joint stiffness and improve flexibilityand placed on therabar to maintain MP's in extension; monitored for pain/redness.      Therapeutic Activities to improve functional performance for 20 minutes, including:  - Gentle composite active MP flexion and passive MP extension with   - Place and hold for MP extension for digits 2-3 x 3-5 sec x 10 reps  - place and hold for individual digit's 2-3 extension x 3-5 sec x 10 reps each   - Active thumb flexion with passive abduction/extension , 3 sec hold, repeat  - 10 g finger wt to thumb for ab/ad and thumb extension with 3 sec hold x 10 reps   - Passive digit ab/ad for digits 2-3 x 15 reps each (NT)  - -  4# clothespin for key pinch x 20 reps with coban for easier grasp.   - education on positioning at home for LLPS in MP extension utilizing a towel or small ball.   --- able to grossly grasp 1.5 " PVC pipe for gross  into red putty and putty drags x 10 reps for /hand strengthening   - added soft red  putty in clinic for gross lumbrical  and  thumb adduction x 10 reps   - digit extension, pom pom push  KT tape to dorsal digits    - digit abd/add pom pom    - Adjustment to orthosis      Manual therapy techniques: for 10 min (including Joint mobilizations, Myofacial release, Manual Lymphatic Drainage, Soft tissue Mobilization, and Friction Massage)  were applied to the right hand  as follows:   -- Gentle passive stretch to thumb to increase MP extension/abduction   - added gentle passive supination and wrist flex/ext  to improve wrist rotation and flexibility to hold game controller  - ktape for digit extension         Patient Education and Home Exercises      Education provided:   - Cont HEP   - Per above   - Progress towards goals   - orthotic " check  - padding application for custom orthosis for finger spacers - the patient was sent home with materials this date due to labor day holiday impacting scheduling.     Written Home Exercises Provided: Patient instructed to cont prior HEP.  Exercises were reviewed and Jethro was able to demonstrate them prior to the end of the session.  Jethro demonstrated good  understanding of the HEP provided. See EMR under Patient Instructions for exercises provided during therapy sessions.       Assessment       Good effort is noted with attention to active digit extension. He responds well with improved endurance when ktape is applied to dorsum of hand and fingers for assisted extension. Index digit is noted to have best strength and control in flexion and extension. Digits 3-5 continue to be limited in extension and isolation. Adjustment to orthosis on this date     Jethro is progressing well towards his goals and there are no updates to goals at this time. Pt prognosis is Fair to Good.  Patient requires further visits to improve performance of activities of daily living.     Pt will continue to benefit from skilled outpatient occupational therapy to address the deficits listed in the problem list on initial evaluation provide pt/family education and to maximize pt's level of independence in the home and community environment.     Pt's spiritual, cultural and educational needs considered and pt agreeable to plan of care and goals.    Anticipated barriers to occupational therapy: none     The following goals were discussed with the patient and patient is in agreement with them as to be addressed in the treatment plan.      Short Term Goals: (6 weeks):  1)  Patient to be IND with HEP and modalities for pain management. MET  2)  Increase ROM 3-10 degrees to increase functional hand use for ADLs/work/leisure activities- met  3)   Decrease edema .1-.5mm to increase joint mobility /flexibility for functional hand use. - met      Long Term Goals :To be met by discharge (12 weeks)   1) Patient to be able to grasp game controller and operate thumb control by discharge. Partially met (dreamcast controller only)   2)   Increase ROM 11-20 degrees to increase functional hand use for ADLs/work/leisure activities  3)  Patient to report improved functional use of hand > 40% as compared to prior to surgery.   Plan   Certification Period/Plan of care expiration: 7/10/2024 to 10/10/2024.    BUKC Crane

## 2024-12-03 NOTE — PROGRESS NOTES
12/03/24   Pt presents in clinic today to discuss implants for the left hand. He is continuing therapy for the right side and has no complaints or issues to report.       10/18/24  Patient reports 18 weeks status post right 2nd 3rd 4th and 5th MCP J arthroplasties, right index long and small finger PIP volar plate release and fusion with capsulotomy and boutonniere deformity of the thumb correction and closed reduction PIP joint fusion of the ring finger.  Reports that he is in no pain.  He continues with regular therapy.  He states that he has not played his video games yet.  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  States this consists of a lot of computer work.  He is interested in having the left side done in January.    9/6/24  Patient is 12 weeks status post right 2nd 3rd 4th and 5th MCP J arthroplasties, right index long and small finger PIP volar plate release and fusion with capsulotomy and boutonniere deformity of the thumb correction and closed reduction PIP joint fusion of the ring finger.  He has also 2 weeks status post hardware removal of the ring finger.  He notes that he is in minimal pain.  He reports he has been attending therapy regularly.  He denies any numbness or tingling.  He denies any fever chills or sweats since time of injury    8/7/24  Patient reports 6 weeks status postR 2nd, 3rd, 4th, and 5th MCPJ Arthroplasties, R Index, Long and Small Finger PIP Volar Plate Releasae and Fusion with capsulotomy, Boutieniere Deformity of Thumb Correction and Closed Reduction PIPJ Fusion of Ring Finger reports that he is in minimal pain.  Denies any fever chills or sweats since surgery.  He has been attending therapy regularly.    7/10/24  Mr. Uriarte is here today for a post-operative visit.  He is 14 days status post R 2nd, 3rd, 4th, and 5th MCPJ Arthroplasties, R Index, Long and Small Finger PIP Volar Plate Releasae and Fusion with capsulotomy,  Ra[place]farhan Deformity of Thumb Correction and Closed Reduction PIPJ Fusion of Ring Finger  by Dr. Lucas on 6/26/24. He reports that he is pain is minimal. He states he is taking Tylenol and Ibuprofen for pain. He was made a custom orthosis that he has been taking off during the day as well as nighttime.  He denies fever, chills, and sweats since the time of the surgery.     Physical exam:    Vitals:    10/18/24 1320   PainSc: 0-No pain       Vital signs are stable, patient is afebrile.  Patient is well dressed and well groomed, no acute distress.  Alert and oriented to person, place, and time.  Post op dressing taken down.  Incision is clean, dry and intact.  There is no erythema or exudate.  There is no sign of any infection. He is NVI.  Extensor lag of MCPs noted.  Able to abduct fingers. Thumb opposition to Index Finger.    Assessment:  s/p R 2nd, 3rd, 4th, and 5th MCPJ Arthroplasties, R Index, Long and Small Finger PIP Volar Plate Releasae and Fusion with capsulotomy, [place] Deformity of Thumb Correction and Closed Reduction PIPJ Fusion of Ring Finger         Plan:

## 2024-12-03 NOTE — PROGRESS NOTES
Patient ID: Jethro Uriarte III is a 49 y.o. male.    Chief Complaint: Follow-up of the Right Hand (Stiffness/)    History of Present Illness    CHIEF COMPLAINT:  Mr. Uriarte presents today for follow-up status post right hand surgery and to discuss potential surgery on the left hand.    HPI:  Mr. Uriarte presents for follow-up regarding his right hand post-surgery and to discuss potential surgery for his left hand. His right hand is improving after therapy. He has had dry skin on his hands, which was initially a concern but was identified as wax residue from perfume. He mentions a previous incident where a pin pushed through his pinky finger about three months ago, which was addressed by a doctor named Cliff. There was also another stitch that formed a cyst-like structure and became infected, but it was removed and cleaned.    His left hand still presents with significant issues. The small, ring, and long fingers are collapsed into the palm. The index finger has a significant fixed boutonniere deformity with hyperextension of the DIP joint and flexion of the PIP joint. The thumb has a flexion contracture of the MP joint with no motion in the IP joint.    He demonstrates limited functionality in his left hand. He can pinch between his index finger and thumb but reports limited ability to grasp objects with the middle, ring, and small fingers. The right hand shows some improvement in  strength, with the patient able to use the long, ring, and small fingers for grabbing wider objects.    He expresses readiness for surgery on the left hand, stating he is prepared for the procedure and understands what to expect based on his previous experience. He acknowledges being mentally prepared for the procedure and the post-operative care required.    Mr. Uriarte denies any current infections or wounds on his hands.    SURGICAL HISTORY:  Mr. Uriarte has a history of right hand surgery, which resulted in improvement of hand  function.    IMAGING:  Mr. Uriarte underwent X-rays of bilateral hands in April, which were reviewed during the consultation.      ROS:  General: denies fever, denies chills, denies fatigue, denies weight gain, denies weight loss  Eyes: denies vision changes, denies redness, denies discharge  ENT: denies ear pain, denies nasal congestion, denies sore throat  Cardiovascular: denies chest pain, denies palpitations, denies lower extremity edema  Respiratory: denies cough, denies shortness of breath  Gastrointestinal: denies abdominal pain, denies nausea, denies vomiting, denies diarrhea, denies constipation, denies blood in stool  Genitourinary: denies dysuria, denies hematuria, denies frequency  Musculoskeletal: denies joint pain, denies muscle pain  Skin: denies rash, denies lesion, reports dry skin  Neurological: denies headache, denies dizziness, denies numbness, denies tingling  Psychiatric: denies anxiety, denies depression, denies sleep difficulty         Physical Exam    MSK: Hand/Wrist - Left: Left hand: Small, ring, and long finger collapsed into the palm. Left hand: Index finger with fixed boutonniere deformity, hyperextension of DIP joint, flexion of PIP joint. Left hand: Thumb with flexion contracture of MP joint, no motion of IP joint. Left hand: Middle, ring, and small finger not functional. Left hand: Pinch between index and thumb.  MSK: Hand/Wrist - Right: Right hand post-surgery: Fingers not collapsed in the palm. Right hand: Able to use long, ring, and small fingers for some grabbing.         Assessment & Plan     Ordered bilateral x-rays of the hands.   Discussed and recommended surgery on the left hand.   Plan to perform fusions of the PIP joints on the index, long, ring, and small fingers to put them in better functional position.   Mentioned a later stage operation for MP joints using silastic implants.   Mr. Uriarte understands the risks and benefits and elects to proceed with surgery, expressing  mental preparedness despite previous difficulties with post-surgery confinement.              No follow-ups on file.    This note was generated with the assistance of ambient listening technology. Verbal consent was obtained by the patient and accompanying visitor(s) for the recording of patient appointment to facilitate this note. I attest to having reviewed and edited the generated note for accuracy, though some syntax or spelling errors may persist. Please contact the author of this note for any clarification.

## 2024-12-05 DIAGNOSIS — T84.84XA PAINFUL ORTHOPAEDIC HARDWARE: ICD-10-CM

## 2024-12-05 DIAGNOSIS — M24.549 CONTRACTURE OF JOINT OF FINGER, UNSPECIFIED LATERALITY: Primary | ICD-10-CM

## 2024-12-05 NOTE — PROGRESS NOTES
"  [facility]  Occupational Therapy Discharge Note    Date: 12/3/2024  Name: Jethro Uriarte III  Clinic Number: [id number]    Therapy Diagnosis: R MP capsulotomies/arthroplasties, PIP fusions and PIn removal 6/26/2024 & 8/21/24       Encounter Diagnoses   Name Primary?    Hand pain, right Yes    Range of motion deficit      Swelling of hand joint, right           Medical Diagnosis: Right MP capsulotomies and arthroplasties to all digits; PIP joint fusions digits 2, 3, 5; boutonierre deformity correction thumb, right      ICD-10:   Z98.890 (ICD-10-CM) - Post-operative state   M24.549 (ICD-10-CM) - Contracture of joint of finger, unspecified laterality         Physician: Mahesh Alcala PA-C; Dr. SUMAN Palmer   Physician Orders: Eval and Treat    "POSTOPERATIVE PLAN:  He should keep the dressings in place until his clinic visit in 2 weeks at which time the suture will be removed." - Referring MD surgical report from 08/21/2024     Surgical Procedure and Date: 6/26/24   RIGHT 2ND, 3RD, 4TH AND 5TH MCPJ SOFT TISSUE (WITH CAPSULOTOMIES) AND BALANCING WITH ARTHTOPLASTIES (Right)  INDEX, LONG, AND SMALL FINGER PIP VOLAR PLATE RELEAE AND FUSION WITH CAPSULOTOMY (Right)  BOUTIENIERE DEFORMITY OF THUMB CORRECTION (Right)  CLOSED REDUCTION, PIP JOINT, ARTICULAR, RING FINGER     Evaluation Date: 7/10/2024     Plan of Care Certification Period: 12/31/24     Date of Return to MD: 10/18/24     Visit # / Visits authorized: 20/29, referral #[id number]  Insurance Authorization Period Expiration: 1/22/25     FOTO #1: FSM___nt__ / GOAL ___nt__     Precautions:  Standard and Weightbearing; physical disabilities      Time In: 9:45 am   Time Out: 10:30 am   Total Appointment Time (timed & untimed codes): 45 minutes    SUBJECTIVE     Pt reports: He is able to do more independently for  such as opening bottles and holding items; looking forward to exploring options for the L hand      He was compliant with home exercise " program given last session.   Response to previous treatment:slightly less pain   Functional change: none     Pain: 3/10  Location: right fingers dorsal, sore, tender, hurts       OBJECTIVE     Objective Measures updated at progress report unless specified.    Observation: Mr Uriarte is a 48-year-old male who has polymyositis almost a scleroderma picture with contractures involving both hands left greater than right.  He had almost  disuse of his hands bilaterally he requires constant aid and  help;  he has also wheelchair-bound     Edema. Measured in centimeters.    7/10/2024 9/6/24 9/24/24       Right     Proximal Wrist Crease 14 cm  =    Proximal Palmar Crease        MCPs 17 cm  16.6 (-0.4) 16.4 (-0.2)         Hand ROM. Measured in degrees.  PIPs fused       7/10/2024 9/6/24 9/24/24*       right Right            Index: MP  54/NT 58/65 =              PIP     Fused 25* flex Fused 25*               DIP 27/27 25/25 35/35              GONZALEZ               Long:  MP 50/NT 52/60 50/65              PIP Fused 25* flex = =              DIP 30/30 30/40 35/40              GONZALEZ               Ring:   MP 55/NT 55/58 50/65              PIP Partially fused 30* = =               DIP 40/40 40/55 38/52              GONZALEZ               Small:  MP 40/NT 35/40 26/40               PIP Fused 25* = =               DIP 45/45 45/55 52/55              GONZALEZ               Thumb: MP 55/62  48/65                IP 0/0  0/0       Rad ADD/ABD nt  45       Pal ADD/ABD nt  45          Opposition nt     Passive MP's ROM  40-70; Opposition to LF, 5 cm opening from index to thumb.     Wrist ROM limited due to polymyositis   Wrist ext/flex 18 / 24   Sup/pro 50 / wfl   No significant RD/UD      Sensation- intact          Strength (Dyanmometer) and Pinch Strength (Pinch Gauge)  Measured in pounds and psi. Average of three trials.          Right  Left    Rung II NT NT   Key Pinch NT NT   3pt Pinch NT NT   2pt Pinch NT NT      Treatment     Jethro received the  "treatments listed below:     Supervised modalities  for 10 min after being cleared for contradictions: Paraffin bath -  poured paraffin into bag and placed on hand in bag; wrapped in towel  to maintain heat application to decrease joint stiffness and improve flexibilityand placed on therabar to maintain MP's in extension; monitored for pain/redness.      Therapeutic Activities to improve functional performance for 25 minutes, including:  - Gentle composite active MP flexion and passive MP extension  - place and hold for individual digit's 2-3 extension x 3-5 sec x 10 reps each   - Active thumb flexion with passive abduction/extension , 3 sec hold, repeat  - 10 g finger wt to thumb for ab/ad and thumb extension with 3 sec hold x 10 reps   - Passive digit ab/ad for digits 2-3 x 15 reps each   -  4# clothespin for key pinch x 20 reps with coban for easier grasp.   - able to grossly grasp 1.5 " PVC pipe for gross  into red putty and putty drags x 10 reps for /hand strengthening   - added soft red  putty in clinic for gross lumbrical  and  thumb adduction x 10 reps   - digit extension, pom pom push  - Adjustment to orthosis        NP:   KT tape to dorsal digits    - digit abd/add pom pom    -  dice and pom poms with index-thumb opposition with no difficulty   - removed 10 grooved pegs from pegboard with no difficulty   - education on positioning at home for LLPS in MP extension utilizing a towel or small ball.   - added light red rubberband for thumb abduction in limited range x 20 reps     Manual therapy techniques: for 10 min (including Joint mobilizations, Myofacial release, Manual Lymphatic Drainage, Soft tissue Mobilization, and Friction Massage)  were applied to the right hand  as follows:   - Gentle passive stretch to thumb to increase MP extension/abduction   - added gentle passive supination and wrist flex/ext  to improve wrist rotation and flexibility to hold game controller  - ktape " for digit extension       Patient Education and Home Exercises      Education provided:   - Cont HEP   - Per above   - Progress towards goals   - orthotic check    Written Home Exercises Provided: Patient instructed to cont prior HEP.  Exercises were reviewed and Jethro was able to demonstrate them prior to the end of the session.  Jethro demonstrated good  understanding of the HEP provided. See EMR under Patient Instructions for exercises provided during therapy sessions.       Assessment     Jethro tolerated all treatment tasks well. Strength continues to progress for functional use of hand. Index digit is noted to have best strength and control in flexion and extension. Digits 3-5 continue to be limited in extension and isolation. Pt has met all STG. Jethro will continue his HEP and activity modifications learned in OT at home. Pt safe for discharge.     Jethro is progressing well towards his goals and there are no updates to goals at this time. Pt prognosis is Fair to Good.     Pt's spiritual, cultural and educational needs considered and pt agreeable to plan of care and goals.    Anticipated barriers to occupational therapy: none     The following goals were discussed with the patient and patient is in agreement with them as to be addressed in the treatment plan.      Short Term Goals: (6 weeks):  1)  Patient to be IND with HEP and modalities for pain management. MET  2)  Increase ROM 3-10 degrees to increase functional hand use for ADLs/work/leisure activities- met  3)   Decrease edema .1-.5mm to increase joint mobility /flexibility for functional hand use. - met     Long Term Goals :To be met by discharge (12 weeks)   1) Patient to be able to grasp game controller and operate thumb control by discharge. Partially met (dreamcast controller only)   2)   Increase ROM 11-20 degrees to increase functional hand use for ADLs/work/leisure activities  3)  Patient to report improved functional use of hand > 40% as compared  to prior to surgery.   Plan   Certification Period/Plan of care expiration: 7/10/2024 to 10/10/2024.    Pt and superivisng OT in agreement for discharge on this date.     BUCK Crane

## 2025-01-21 DIAGNOSIS — M24.549 CONTRACTURE, UNSPECIFIED HAND: Primary | ICD-10-CM

## 2025-01-24 ENCOUNTER — ANESTHESIA EVENT (OUTPATIENT)
Dept: SURGERY | Facility: OTHER | Age: 50
End: 2025-01-24
Payer: OTHER GOVERNMENT

## 2025-01-24 RX ORDER — ACETAMINOPHEN 500 MG
1000 TABLET ORAL
OUTPATIENT
Start: 2025-01-24 | End: 2025-01-24

## 2025-01-24 RX ORDER — SODIUM CHLORIDE, SODIUM LACTATE, POTASSIUM CHLORIDE, CALCIUM CHLORIDE 600; 310; 30; 20 MG/100ML; MG/100ML; MG/100ML; MG/100ML
INJECTION, SOLUTION INTRAVENOUS CONTINUOUS
OUTPATIENT
Start: 2025-01-24

## 2025-01-24 RX ORDER — LIDOCAINE HYDROCHLORIDE 10 MG/ML
0.5 INJECTION, SOLUTION EPIDURAL; INFILTRATION; INTRACAUDAL; PERINEURAL ONCE
OUTPATIENT
Start: 2025-01-24 | End: 2025-01-24

## 2025-01-24 RX ORDER — PREGABALIN 75 MG/1
75 CAPSULE ORAL ONCE
OUTPATIENT
Start: 2025-01-24 | End: 2025-01-24

## 2025-01-30 ENCOUNTER — HOSPITAL ENCOUNTER (OUTPATIENT)
Dept: PREADMISSION TESTING | Facility: OTHER | Age: 50
Discharge: HOME | End: 2025-01-30
Attending: ORTHOPAEDIC SURGERY
Payer: MEDICAID

## 2025-01-30 VITALS
TEMPERATURE: 99 F | HEART RATE: 109 BPM | SYSTOLIC BLOOD PRESSURE: 118 MMHG | WEIGHT: 130 LBS | OXYGEN SATURATION: 96 % | RESPIRATION RATE: 16 BRPM | HEIGHT: 68 IN | BODY MASS INDEX: 19.7 KG/M2 | DIASTOLIC BLOOD PRESSURE: 74 MMHG

## 2025-01-30 NOTE — ANESTHESIA PREPROCEDURE EVALUATION
01/30/2025  Jethro Uriarte III is a 49 y.o., male.      Pre-op Assessment    I have reviewed the Patient Summary Reports.     I have reviewed the Nursing Notes. I have reviewed the NPO Status.   I have reviewed the Medications.     Review of Systems  Anesthesia Hx:    Hx Emergence Delirium           Denies Family Hx of Anesthesia complications.   Personal Hx of Anesthesia complications   Difficult Intubation                  Hematology/Oncology:  Hematology Normal   Oncology Normal                                   EENT/Dental:  chronic allergic rhinitis           Cardiovascular:  Cardiovascular Normal                   ECHO 2020  EF 55%    Negative cath 2020                           Pulmonary:         Bronchiectasis               Renal/:  Renal/ Normal                 Hepatic/GI:  Hepatic/GI Normal                    Musculoskeletal:     Polymyositis  Systemic sclerosis            Neurological:    Denies CVA.    Seizures    Wheelchair bound    Medication induced seizure - 4 years ago (no longer taking)                            Endocrine:     Chronic steroids (been off for several years)        Psych:  Psychiatric Normal                  Physical Exam  General: Well nourished and Cooperative    Airway:  Mallampati: IV   Mouth Opening: < 3 cm  TM Distance: Normal  Neck ROM: Extension Decreased    Dental:  Periodontal disease        Anesthesia Plan  Type of Anesthesia, risks & benefits discussed:    Anesthesia Type: Regional  Intra-op Monitoring Plan: Standard ASA Monitors  Post Op Pain Control Plan: multimodal analgesia and peripheral nerve block  Induction:  IV  Informed Consent: Informed consent signed with the Patient and all parties understand the risks and agree with anesthesia plan.  All questions answered.   ASA Score: 3  Anesthesia Plan Notes: Peripheral nerve block for case discussed   Previous  hand surgeries under block last year    Ready For Surgery From Anesthesia Perspective.     .

## 2025-01-30 NOTE — DISCHARGE INSTRUCTIONS
Information to Prepare you for your Surgery    PRE-ADMIT TESTING   [address]  [place] [address]  [address]     Your surgery has been scheduled at [facility]. We are pleased to have the opportunity to serve you. For Further Information please call [phone].    On the day of surgery please report to Registration on the 1st floor of the [facility].    CONTACT YOUR PHYSICIAN'S OFFICE THE DAY PRIOR TO YOUR SURGERY TO OBTAIN YOUR ARRIVAL TIME.     The evening before surgery do not eat anything after 9 p.m. ( this includes hard candy, chewing gum and mints).  You may only have GATORADE, POWERADE AND WATER  from 9 p.m. until you leave your home.   DO NOT DRINK ANY LIQUIDS ON THE WAY TO THE HOSPITAL.      Why does your anesthesiologist allow you to drink Gatorade/Powerade before surgery?  Gatorade/Powerade helps to increase your comfort before surgery and to decrease your nausea after surgery.   The carbohydrates in Gatorade/Powerade help reduce your body's stress response to surgery.  If you are a diabetic-drink only water prior to surgery.    Outpatient Surgery- May allow 2 adults (18 and older)/ Support Persons (1 being the designated ) for all surgical/procedural patients. A breastfeeding mother will be allowed her infant and 2 adult Support Persons. No one under the age of 18 will be allowed in the building.    MEDICATION INSTRUCTIONS: TAKE medications checked off by the Anesthesiologist on your Medication List.    Surgery Patients:  If you take ASPIRIN - Your PHYSICIAN/SURGEON will need to inform you IF/OR when you need to stop taking aspirin prior to your surgery.     Starting the week prior to surgery, do not take any medications containing IBUPROFEN or NSAIDS (Advil, Aleve, BC, Celebrex, Goody's, Ketorolac, Meloxicam, Mobic, Motrin, Naproxen, Toradol, etc).  If you are not sure if you should take a medicine please call your surgeon's office.  You may take Tylenol.    Do  Not Wear any make-up (especially eye make-up) to surgery. Please remove any false eyelashes or eyelash extensions. If you arrive the day of surgery with makeup/eyelashes on you will be required to remove prior to surgery. (There is a risk of corneal abrasions if eye makeup/eyelash extensions are not removed)    Leave all valuables at home.   Do Not wear any jewelry or watches, including any metal in body piercings. Jewelry must be removed prior to coming to the hospital.  There is a possibility that rings that are unable to be removed may be cut off if they are on the surgical extremity.    Please remove all hair extensions, wigs, clips and any other metal accessories/ ornaments from your hair.  These items may pose a flammable/fire risk in Surgery and must be removed.    Do not shave your surgical area at least 5 days prior to your surgery. The surgical prep will be performed at the hospital according to Infection Control regulations.    Contact Lens must be removed before surgery. Either do not wear the contact lens or bring a case and solution for storage.  Please bring a container for eyeglasses or dentures as required.  Bring any paperwork your physician has provided, such as consent forms,  history and physicals, doctor's orders, etc.   Bring comfortable clothes that are loose fitting to wear upon discharge. Take into consideration the type of surgery being performed.  Maintain your diet as advised per your physician the day prior to surgery.    Adequate rest the night before surgery is advised.   Park in the Parking lot behind the hospital or in the [facility] across the street from the parking lot. Parking is complimentary.  If you will be discharged the same day as your procedure, please arrange for a responsible adult to drive you home or to accompany you if traveling by taxi.   YOU WILL NOT BE PERMITTED TO DRIVE OR TO LEAVE THE HOSPITAL ALONE AFTER SURGERY.   If you are being discharged the  same day, it is strongly recommended that you arrange for someone to remain with you for the first 24 hrs following your surgery.    The Surgeon will speak to your family/visitor after your surgery regarding the outcome of your surgery and post op care.  The Surgeon may speak to you after your surgery, but there is a possibility you may not remember the details.  Please check with your family members regarding the conversation with the Surgeon.    We strongly recommend whoever is bringing you home be present for discharge instructions.  This will ensure a thorough understanding for your post op home care.              Bathing Instructions with Hibiclens  Shower the evening before and morning of your procedure with Chlorhexidine (Hibiclens)    Do not use Chlorhexidine on your face or genitals. Do not get in your eyes.  Wash your face with water and your regular face wash/soap  Use your regular shampoo  Apply Chlorhexidine (Hibiclens) directly on your skin or on a wet washcloth and wash gently. When showering: Move away from the shower stream when applying Chlorhexidine (Hibiclens) to avoid rinsing off too soon.  Rinse thoroughly with warm water  Do not dilute Chlorhexidine (Hibiclens)   Dry off as usual, do not use any deodorant, powder, body lotions, perfume, after shave or cologne.     If the patient has fever, cough, or signs/symptoms of Flu or Covid please do not come in for your surgery.   Contact your surgeon and your primary care physician for further instructions.   Please also call [facility] Outpatient Surgery [phone]. The unit opens at 5 AM.    If applicable, please bring your blood pressure & diabetes medications the day of surgery.

## 2025-02-03 RX ORDER — ACETAMINOPHEN 500 MG
1000 TABLET ORAL 2 TIMES DAILY PRN
Qty: 50 TABLET | Refills: 0 | Status: SHIPPED | OUTPATIENT
Start: 2025-02-03

## 2025-02-03 RX ORDER — OXYCODONE AND ACETAMINOPHEN 5; 325 MG/1; MG/1
1 TABLET ORAL
Qty: 10 TABLET | Refills: 0 | Status: SHIPPED | OUTPATIENT
Start: 2025-02-03

## 2025-02-03 RX ORDER — ONDANSETRON 4 MG/1
4 TABLET, FILM COATED ORAL EVERY 8 HOURS PRN
Qty: 10 TABLET | Refills: 0 | Status: SHIPPED | OUTPATIENT
Start: 2025-02-03

## 2025-02-03 RX ORDER — IBUPROFEN 600 MG/1
600 TABLET ORAL 3 TIMES DAILY PRN
Qty: 45 TABLET | Refills: 0 | Status: SHIPPED | OUTPATIENT
Start: 2025-02-03

## 2025-02-04 ENCOUNTER — TELEPHONE (OUTPATIENT)
Dept: ORTHOPEDICS | Facility: CLINIC | Age: 50
End: 2025-02-04
Payer: OTHER GOVERNMENT

## 2025-02-04 NOTE — TELEPHONE ENCOUNTER
Spoke c pt. Informed pt of 8:30 arrival time for 2/5/25 surgery at the [facility]. Reminded pt of NPO status. Pt expressed understanding & was thankful.

## 2025-02-05 ENCOUNTER — ANESTHESIA (OUTPATIENT)
Dept: SURGERY | Facility: OTHER | Age: 50
End: 2025-02-05
Payer: OTHER GOVERNMENT

## 2025-02-05 ENCOUNTER — HOSPITAL ENCOUNTER (OUTPATIENT)
Facility: OTHER | Age: 50
Discharge: HOME | End: 2025-02-05
Attending: ORTHOPAEDIC SURGERY | Admitting: ORTHOPAEDIC SURGERY
Payer: OTHER GOVERNMENT

## 2025-02-05 DIAGNOSIS — M24.541 CONTRACTURE OF RIGHT HAND: Primary | ICD-10-CM

## 2025-02-05 PROBLEM — M24.542 CONTRACTURE OF LEFT HAND: Status: ACTIVE | Noted: 2025-02-05

## 2025-02-05 PROCEDURE — 27201423 OPTIME MED/SURG SUP & DEVICES STERILE SUPPLY: Performed by: ORTHOPAEDIC SURGERY

## 2025-02-05 PROCEDURE — 25000003 PHARM REV CODE 250: Performed by: ORTHOPAEDIC SURGERY

## 2025-02-05 PROCEDURE — 26861 FUSION OF FINGER JNT ADD-ON: CPT | Mod: F2,F3,F4, | Performed by: ORTHOPAEDIC SURGERY

## 2025-02-05 PROCEDURE — 71000015 HC POSTOP RECOV 1ST HR: Performed by: ORTHOPAEDIC SURGERY

## 2025-02-05 PROCEDURE — 63600175 PHARM REV CODE 636 W HCPCS: Performed by: ANESTHESIOLOGY

## 2025-02-05 PROCEDURE — 37000009 HC ANESTHESIA EA ADD 15 MINS: Performed by: ORTHOPAEDIC SURGERY

## 2025-02-05 PROCEDURE — 64415 NJX AA&/STRD BRCH PLXS IMG: CPT | Performed by: ANESTHESIOLOGY

## 2025-02-05 PROCEDURE — D9220A PRA ANESTHESIA: Mod: CRNA,,, | Performed by: STUDENT IN AN ORGANIZED HEALTH CARE EDUCATION/TRAINING PROGRAM

## 2025-02-05 PROCEDURE — 71000016 HC POSTOP RECOV ADDL HR: Performed by: ORTHOPAEDIC SURGERY

## 2025-02-05 PROCEDURE — C1769 GUIDE WIRE: HCPCS | Performed by: ORTHOPAEDIC SURGERY

## 2025-02-05 PROCEDURE — 63600175 PHARM REV CODE 636 W HCPCS

## 2025-02-05 PROCEDURE — 63600175 PHARM REV CODE 636 W HCPCS: Performed by: STUDENT IN AN ORGANIZED HEALTH CARE EDUCATION/TRAINING PROGRAM

## 2025-02-05 PROCEDURE — D9220A PRA ANESTHESIA: Mod: ANES,,, | Performed by: ANESTHESIOLOGY

## 2025-02-05 PROCEDURE — 26860 FUSION OF FINGER JOINT: CPT | Mod: F1,,, | Performed by: ORTHOPAEDIC SURGERY

## 2025-02-05 PROCEDURE — 25000003 PHARM REV CODE 250: Performed by: ANESTHESIOLOGY

## 2025-02-05 PROCEDURE — 25000003 PHARM REV CODE 250: Performed by: STUDENT IN AN ORGANIZED HEALTH CARE EDUCATION/TRAINING PROGRAM

## 2025-02-05 PROCEDURE — 36000708 HC OR TIME LEV III 1ST 15 MIN: Performed by: ORTHOPAEDIC SURGERY

## 2025-02-05 PROCEDURE — 36000709 HC OR TIME LEV III EA ADD 15 MIN: Performed by: ORTHOPAEDIC SURGERY

## 2025-02-05 PROCEDURE — C1713 ANCHOR/SCREW BN/BN,TIS/BN: HCPCS | Performed by: ORTHOPAEDIC SURGERY

## 2025-02-05 PROCEDURE — 37000008 HC ANESTHESIA 1ST 15 MINUTES: Performed by: ORTHOPAEDIC SURGERY

## 2025-02-05 DEVICE — IMPLANTABLE DEVICE: Type: IMPLANTABLE DEVICE | Site: HAND | Status: FUNCTIONAL

## 2025-02-05 RX ORDER — ONDANSETRON HYDROCHLORIDE 2 MG/ML
INJECTION, SOLUTION INTRAVENOUS
Status: DISCONTINUED | OUTPATIENT
Start: 2025-02-05 | End: 2025-02-05

## 2025-02-05 RX ORDER — PROPOFOL 10 MG/ML
VIAL (ML) INTRAVENOUS CONTINUOUS PRN
Status: DISCONTINUED | OUTPATIENT
Start: 2025-02-05 | End: 2025-02-05

## 2025-02-05 RX ORDER — SODIUM CHLORIDE, SODIUM LACTATE, POTASSIUM CHLORIDE, CALCIUM CHLORIDE 600; 310; 30; 20 MG/100ML; MG/100ML; MG/100ML; MG/100ML
INJECTION, SOLUTION INTRAVENOUS CONTINUOUS
Status: DISCONTINUED | OUTPATIENT
Start: 2025-02-05 | End: 2025-02-05 | Stop reason: HOSPADM

## 2025-02-05 RX ORDER — PREGABALIN 75 MG/1
75 CAPSULE ORAL ONCE
Status: COMPLETED | OUTPATIENT
Start: 2025-02-05 | End: 2025-02-05

## 2025-02-05 RX ORDER — CEFAZOLIN 2 G/1
2 INJECTION, POWDER, FOR SOLUTION INTRAMUSCULAR; INTRAVENOUS
Status: COMPLETED | OUTPATIENT
Start: 2025-02-05 | End: 2025-02-05

## 2025-02-05 RX ORDER — MIDAZOLAM HYDROCHLORIDE 1 MG/ML
INJECTION INTRAMUSCULAR; INTRAVENOUS
Status: DISCONTINUED | OUTPATIENT
Start: 2025-02-05 | End: 2025-02-05

## 2025-02-05 RX ORDER — GLUCAGON 1 MG
1 KIT INJECTION
OUTPATIENT
Start: 2025-02-05

## 2025-02-05 RX ORDER — DEXMEDETOMIDINE HYDROCHLORIDE 100 UG/ML
INJECTION, SOLUTION INTRAVENOUS
Status: DISCONTINUED | OUTPATIENT
Start: 2025-02-05 | End: 2025-02-05

## 2025-02-05 RX ORDER — PROCHLORPERAZINE EDISYLATE 5 MG/ML
5 INJECTION INTRAMUSCULAR; INTRAVENOUS EVERY 30 MIN PRN
OUTPATIENT
Start: 2025-02-05

## 2025-02-05 RX ORDER — SODIUM CHLORIDE 0.9 % (FLUSH) 0.9 %
3 SYRINGE (ML) INJECTION
OUTPATIENT
Start: 2025-02-05

## 2025-02-05 RX ORDER — ROPIVACAINE HYDROCHLORIDE 5 MG/ML
INJECTION, SOLUTION EPIDURAL; INFILTRATION; PERINEURAL
Status: COMPLETED | OUTPATIENT
Start: 2025-02-05 | End: 2025-02-05

## 2025-02-05 RX ORDER — OXYCODONE HYDROCHLORIDE 5 MG/1
5 TABLET ORAL
OUTPATIENT
Start: 2025-02-05

## 2025-02-05 RX ORDER — PHENYLEPHRINE HYDROCHLORIDE 10 MG/ML
INJECTION INTRAVENOUS
Status: DISCONTINUED | OUTPATIENT
Start: 2025-02-05 | End: 2025-02-05

## 2025-02-05 RX ORDER — FENTANYL CITRATE 50 UG/ML
INJECTION, SOLUTION INTRAMUSCULAR; INTRAVENOUS
Status: DISCONTINUED | OUTPATIENT
Start: 2025-02-05 | End: 2025-02-05

## 2025-02-05 RX ORDER — LIDOCAINE HYDROCHLORIDE 10 MG/ML
0.5 INJECTION, SOLUTION EPIDURAL; INFILTRATION; INTRACAUDAL; PERINEURAL ONCE
Status: DISCONTINUED | OUTPATIENT
Start: 2025-02-05 | End: 2025-02-05 | Stop reason: HOSPADM

## 2025-02-05 RX ORDER — LIDOCAINE HYDROCHLORIDE 10 MG/ML
INJECTION, SOLUTION INTRAVENOUS
Status: DISCONTINUED | OUTPATIENT
Start: 2025-02-05 | End: 2025-02-05

## 2025-02-05 RX ORDER — HYDROMORPHONE HYDROCHLORIDE 2 MG/ML
0.4 INJECTION, SOLUTION INTRAMUSCULAR; INTRAVENOUS; SUBCUTANEOUS EVERY 5 MIN PRN
OUTPATIENT
Start: 2025-02-05

## 2025-02-05 RX ORDER — DIPHENHYDRAMINE HYDROCHLORIDE 50 MG/ML
INJECTION INTRAMUSCULAR; INTRAVENOUS
Status: DISCONTINUED | OUTPATIENT
Start: 2025-02-05 | End: 2025-02-05

## 2025-02-05 RX ORDER — ACETAMINOPHEN 500 MG
1000 TABLET ORAL
Status: COMPLETED | OUTPATIENT
Start: 2025-02-05 | End: 2025-02-05

## 2025-02-05 RX ORDER — BACITRACIN ZINC 500 UNIT/G
OINTMENT (GRAM) TOPICAL
Status: DISCONTINUED | OUTPATIENT
Start: 2025-02-05 | End: 2025-02-05 | Stop reason: HOSPADM

## 2025-02-05 RX ADMIN — ONDANSETRON HYDROCHLORIDE 4 MG: 2 INJECTION INTRAMUSCULAR; INTRAVENOUS at 11:02

## 2025-02-05 RX ADMIN — ACETAMINOPHEN 1000 MG: 500 TABLET, FILM COATED ORAL at 08:02

## 2025-02-05 RX ADMIN — DEXMEDETOMIDINE HYDROCHLORIDE 12 MCG: 100 INJECTION, SOLUTION, CONCENTRATE INTRAVENOUS at 11:02

## 2025-02-05 RX ADMIN — CEFAZOLIN 2 G: 2 INJECTION, POWDER, FOR SOLUTION INTRAMUSCULAR; INTRAVENOUS at 11:02

## 2025-02-05 RX ADMIN — PHENYLEPHRINE HYDROCHLORIDE 0.2 MCG/KG/MIN: 10 INJECTION INTRAVENOUS at 11:02

## 2025-02-05 RX ADMIN — SODIUM CHLORIDE: 0.9 INJECTION, SOLUTION INTRAVENOUS at 10:02

## 2025-02-05 RX ADMIN — GLYCOPYRROLATE 0.2 MG: 0.2 INJECTION, SOLUTION INTRAMUSCULAR; INTRAVITREAL at 11:02

## 2025-02-05 RX ADMIN — PHENYLEPHRINE HYDROCHLORIDE 200 MCG: 10 INJECTION INTRAVENOUS at 11:02

## 2025-02-05 RX ADMIN — MIDAZOLAM HYDROCHLORIDE 2 MG: 1 INJECTION INTRAMUSCULAR; INTRAVENOUS at 10:02

## 2025-02-05 RX ADMIN — DIPHENHYDRAMINE HYDROCHLORIDE 12.5 MG: 50 INJECTION, SOLUTION INTRAMUSCULAR; INTRAVENOUS at 11:02

## 2025-02-05 RX ADMIN — ROPIVACAINE HYDROCHLORIDE 30 ML: 5 INJECTION, SOLUTION EPIDURAL; INFILTRATION; PERINEURAL at 10:02

## 2025-02-05 RX ADMIN — PREGABALIN 75 MG: 75 CAPSULE ORAL at 08:02

## 2025-02-05 RX ADMIN — PHENYLEPHRINE HYDROCHLORIDE 100 MCG: 10 INJECTION INTRAVENOUS at 11:02

## 2025-02-05 RX ADMIN — LIDOCAINE HYDROCHLORIDE 50 MG: 10 INJECTION, SOLUTION INTRAVENOUS at 11:02

## 2025-02-05 RX ADMIN — PHENYLEPHRINE HYDROCHLORIDE 200 MCG: 10 INJECTION INTRAVENOUS at 12:02

## 2025-02-05 RX ADMIN — FENTANYL CITRATE 100 MCG: 50 INJECTION, SOLUTION INTRAMUSCULAR; INTRAVENOUS at 10:02

## 2025-02-05 RX ADMIN — PHENYLEPHRINE HYDROCHLORIDE 200 MCG: 10 INJECTION INTRAVENOUS at 01:02

## 2025-02-05 RX ADMIN — PROPOFOL 50 MCG/KG/MIN: 10 INJECTION, EMULSION INTRAVENOUS at 11:02

## 2025-02-05 NOTE — OP NOTE
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    Complications: No    Estimated Blood Loss (EBL): 5 mL           Implants:   Implant Name Type Inv. Item Serial No.  Lot No. LRB No. Used Action   GUIDEWIRE SNGL TRCR [id number] - [id number]  GUIDEWIRE SNGL TRCR [id number]  [employer]  Left 2 Implanted and Explanted   SCREW ACUTRAK 3 MICRO 2.5X18MM - [id number]  SCREW ACUTRAK 3 MICRO 2.5X18MM  [employer]  Left 2 Implanted   GUIDEWIRE SNGL TRCR [id number] - [id number]  GUIDEWIRE SNGL TRCR [id number]  [employer]  Left 2 Implanted and Explanted   SCREW ACUTRAK 3 SARA 2.0X18MM - [id number]  SCREW ACUTRAK 3 SARA 2.0X18MM  [employer]  Left 1 Implanted   SCREW ACUTRAK 3 MICRO 2.5X16MM    [employer]  Left 1 Implanted       Specimens:  None    Perioperative Antibiotics:  Cefazolin 2 g           Condition: Good    Disposition: PACU - hemodynamically stable.    Indications for Procedure:  Jethro Uriarte III is a 49 y.o. male with polymyositis who has subsequently developed hand contractures that were causing reduced function.  He previously had surgery on his right hand and decided to proceed with corrective surgery on the left at this time.  Plan for PIP joint fusions of the index, long, ring, and small fingers.  The risks, benefits, and alternatives to surgical intervention were  discussed with the patient and the family and they wished to proceed with surgical intervention. Risks include but are not limited to pain, bleeding, scarring, infection, stiffness, prolonged rehab, hardware failure, painful hardware, nonunion, malunion, iatrogenic fracture, rotational malalignment, and wound healing complications.    Procedure in Detail:  The patient was marked in the preoperative holding area with the surgeon's initials and correct side/site of procedure.  Regional anesthesia was administered in the preoperative holding area.  The patient was brought to the operating room and placed on the operating table. MAC was provided by anesthesia. The patient was placed in a supine position with a hand position and a well-padded hand table.  A nonsterile tourniquet was placed on the arm.  The operative extremity was  prepped and draped in standard sterile fashion. A formal timeout was performed confirming correct patient, side, site, and procedure.     The operative extremity was exsanguinated with an Esmarch and the tourniquet was inflated to 250 mmHg where it remained for 103 minutes.  Incisions were marked out over the dorsal aspects of the index, long, ring, and small finger PIP joints in a curvilinear fashion.  All incisions were made sharply with a scalpel through skin and subcutaneous tissue.  Further sharp dissection was performed with a scalpel to elevate skin flaps over the PIP joints.  A transverse extensor tenotomy was performed in all joints to expose the PIP joint.  There was significant adhesion of the extensor tendons onto all of the heads of the proximal phalanges.  After adequate dissection, the phalanx has were able to be visualized appropriately.    I then turned my attention to the bony preparation portion of the procedure.  Two Homans were placed underneath the phalangeal neck to protect the neurovascular structures and an oscillating saw was used to perform a neck cut just proximal to  the condyles.  The phalanx heads were then removed with a combination of a scalpel and a rongeur.  The base of the middle phalanx of the index finger had fibrocartilage present which was removed with a rongeur to prepare the surface for fusion.  The long, ring, and small finger PIP joints were fused and a portion of the middle phalanx base was removed along with the proximal phalanx head to prepare the surfaces for fusion.  At the conclusion of the bony preparation, there was cancellous bone present on all surfaces.    I then turned my attention to the hardware insertion portion of the procedure.  The index finger PIP joint is held in 40° of flexion, the long finger PIP joint was held in 45° of flexion, the ring finger PIP joint was held in 50° of flexion, in the small finger PIP joint was held in 55° of flexion.  This was checked with the provided [employer] goniometer.  For all fingers I then used a wire  to advance the guidewire for the [employer] Acu[employer] headless compression screws with the dorsal aspect of the distal proximal phalanx into the canal of the middle phalanx.  Positioning was checked on fluoroscopy to determine appropriate angulation, radial/ulnar deviation, and position of the guidewire in the bone.  After I was satisfied with my wire placement, appropriately sized screws were measured.  The larger cortical drill was used to drill the proximal cortex in the proximal phalanx and the smaller drill was then advanced under fluoroscopic guidance to allow for screw insertion.  18 mm x 2.5 mm diameter headless compression screws were placed into the index and long finger PIP joints.  The index finger screw was left slightly proud due to concern for fracturing of the proximal phalanx with the additional insertion.  One 18 mm x 2.0 mm diameter headless compression screw was placed into the ring finger PIP joint.  Finally, one 16 mm x 2 mm diameter headless compression screw was placed into the small finger  PIP joint.  All screws achieved good purchase.  Final positioning of the hardware and PIP joints were checked on fluoroscopy.    It should also be noted that all DIP joints appeared fused and the index finger DIP joint is fused in suboptimal hyperextension.  I will plan to address this at a later date when I address his MCP joints.    The wounds were then copiously irrigated with normal saline.  Skin was closed with 4-0 nylon in a horizontal mattress fashion.  A well-padded splint was placed on the hand and resting flexion with plaster placed both dorsally and volarly.  Tourniquet was taken down and brisk capillary refill returned to the fingers.    He was awoken from anesthesia and taken in stable condition to the PACU.    Plan:  Nonweightbearing to the left upper extremity for approximately 6 weeks to allow for healing.  Keep splint clean, dry, and intact.    Follow up in 2 weeks for suture removal.

## 2025-02-05 NOTE — BRIEF OP NOTE
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    DISPOSITION: Home or Self Care    FINAL DIAGNOSIS:  Contracture of left hand    FOLLOWUP: In clinic      DISCHARGE INSTRUCTIONS:    Discharge Procedure Orders   Diet general     Call MD for:  temperature >100.4     Call MD for:  persistent nausea and vomiting     Call MD for:  severe uncontrolled pain     Call MD for:  difficulty breathing, headache or visual disturbances     Call MD for:  redness, tenderness, or signs of infection (pain, swelling, redness, odor or green/yellow discharge around incision site)     Call MD for:  hives     Call MD for:  persistent dizziness or light-headedness     Call MD for:  extreme fatigue     Leave dressing on - Keep it clean, dry, and intact until clinic visit     Non weight bearing

## 2025-02-05 NOTE — ANESTHESIA POSTPROCEDURE EVALUATION
Anesthesia Post Evaluation    Patient: Jethro Ebbs III    Procedure(s) Performed: Procedure(s) (LRB):  LEFT INDEXM LONG , RING AND SMALL  HAND PIP ARTHRODESIS WITH HARDWARE (Left)    Final Anesthesia Type: MAC      Patient location during evaluation: [facility]  Patient participation: Yes- Able to Participate  Level of consciousness: awake and alert  Post-procedure vital signs: reviewed and stable  Pain management: adequate  Airway patency: patent    PONV status at discharge: No PONV  Anesthetic complications: no      Cardiovascular status: blood pressure returned to baseline  Respiratory status: unassisted, spontaneous ventilation and room air  Hydration status: euvolemic  Follow-up not needed.              Vitals Value Taken Time   /59 02/05/25 1313   Temp 36 02/05/25 1313   Pulse 85 02/05/25 1313   Resp 12 02/05/25 1313   SpO2 100 02/05/25 1313         No case tracking events are documented in the log.      Pain/Tu Score: Pain Rating Prior to Med Admin: 0 (2/5/2025  8:49 AM)

## 2025-02-05 NOTE — DISCHARGE INSTRUCTIONS
Patient to wear sling as long as patients arm is numb   Do not get wet keep clean dry and intactJames Ebbs III has met all discharge criteria from Phase II. Vital Signs are stable, ambulating  without difficulty. Discharge instructions given, patient verbalized understanding. Discharged from facility via wheelchair in stable condition.

## 2025-02-05 NOTE — H&P
Patient ID: Jethro Uriarte III is a 49 y.o. male.     Chief Complaint: Follow-up of the Right Hand (Stiffness/)     History of Present Illness    CHIEF COMPLAINT:  Mr. Uriarte presents today for follow-up status post right hand surgery and to discuss potential surgery on the left hand.     Interval HPI 02/05/2025:   Patient presents today for surgery.  No interval changes in health.    HPI:  Mr. Uriarte presents for follow-up regarding his right hand post-surgery and to discuss potential surgery for his left hand. His right hand is improving after therapy. He has had dry skin on his hands, which was initially a concern but was identified as wax residue from perfume. He mentions a previous incident where a pin pushed through his pinky finger about three months ago, which was addressed by Cliff. There was also another stitch that formed a cyst-like structure and became infected, but it was removed and cleaned.     His left hand still presents with significant issues. The small, ring, and long fingers are collapsed into the palm. The index finger has a significant fixed boutonniere deformity with hyperextension of the DIP joint and flexion of the PIP joint. The thumb has a flexion contracture of the MP joint with no motion in the IP joint.     He demonstrates limited functionality in his left hand. He can pinch between his index finger and thumb but reports limited ability to grasp objects with the middle, ring, and small fingers. The right hand shows some improvement in  strength, with the patient able to use the long, ring, and small fingers for grabbing wider objects.     He expresses readiness for surgery on the left hand, stating he is prepared for the procedure and understands what to expect based on his previous experience. He acknowledges being mentally prepared for the procedure and the post-operative care required.     Mr. Uriarte denies any current infections or wounds on his hands.     SURGICAL HISTORY:  Mr. Uriarte has  a history of right hand surgery, which resulted in improvement of hand function.     IMAGING:  Mr. Uriarte underwent X-rays of bilateral hands in April, which were reviewed during the consultation.        ROS:  General: denies fever, denies chills, denies fatigue, denies weight gain, denies weight loss  Eyes: denies vision changes, denies redness, denies discharge  ENT: denies ear pain, denies nasal congestion, denies sore throat  Cardiovascular: denies chest pain, denies palpitations, denies lower extremity edema  Respiratory: denies cough, denies shortness of breath  Gastrointestinal: denies abdominal pain, denies nausea, denies vomiting, denies diarrhea, denies constipation, denies blood in stool  Genitourinary: denies dysuria, denies hematuria, denies frequency  Musculoskeletal: denies joint pain, denies muscle pain  Skin: denies rash, denies lesion, reports dry skin  Neurological: denies headache, denies dizziness, denies numbness, denies tingling  Psychiatric: denies anxiety, denies depression, denies sleep difficulty          Physical Exam    MSK: Hand/Wrist - Left: Left hand: Small, ring, and long finger collapsed into the palm. Left hand: Index finger with fixed boutonniere deformity, hyperextension of DIP joint, flexion of PIP joint. Left hand: Thumb with flexion contracture of MP joint, no motion of IP joint. Left hand: Middle, ring, and small finger not functional. Left hand: Pinch between index and thumb.  MSK: Hand/Wrist - Right: Right hand post-surgery: Fingers not collapsed in the palm. Right hand: Able to use long, ring, and small fingers for some grabbing.          Assessment & Plan    Jethro Uriarte III is a 49 y.o. male with left hand contractures.  Plan for PIP fusions of the index, long, ring, and small fingers today.     Ordered bilateral x-rays of the hands.   Discussed and recommended surgery on the left hand.   Plan to perform fusions of the PIP joints on the index, long, ring, and small fingers  to put them in better functional position.   Mentioned a later stage operation for MP joints using silastic implants.   Mr. Uriarte understands the risks and benefits and elects to proceed with surgery, expressing mental preparedness despite previous difficulties with post-surgery confinement.

## 2025-02-05 NOTE — ANESTHESIA PROCEDURE NOTES
Peripheral Block    Patient location during procedure: pre-op   Block not for primary anesthetic.  Reason for block: at surgeon's request and post-op pain management   Post-op Pain Location: left hand pain    End time: 2/5/2025 10:30 AM    Staffing  Authorizing Provider: Alex Stockton MD  Performing Provider: Alex Stockton MD    Staffing  Performed by: Alex Stockton MD  Authorized by: Alex Stockton MD    Preanesthetic Checklist  Completed: patient identified, IV checked, site marked, risks and benefits discussed, surgical consent, monitors and equipment checked, pre-op evaluation and timeout performed  Peripheral Block  Patient position: supine  Prep: ChloraPrep  Patient monitoring: heart rate, cardiac monitor, continuous pulse ox, continuous capnometry and frequent blood pressure checks  Block type: supraclavicular  Laterality: left  Injection technique: single shot  Needle  Needle type: Stimuplex   Needle gauge: 22 G  Needle length: 2 in  Needle localization: anatomical landmarks and ultrasound guidance   -ultrasound image captured on disc.  Assessment  Injection assessment: negative aspiration, negative parasthesia and local visualized surrounding nerve  Paresthesia pain: none  Heart rate change: no  Slow fractionated injection: yes  Pain Tolerance: comfortable throughout block and no complaints  Medications:    Medications: ropivacaine (NAROPIN) injection 0.5% - Perineural   30 mL - 2/5/2025 10:31:00 AM    Additional Notes  VSS.  [facility] RN monitoring vitals throughout procedure.  Patient tolerated procedure well.

## 2025-02-06 VITALS
OXYGEN SATURATION: 99 % | DIASTOLIC BLOOD PRESSURE: 68 MMHG | SYSTOLIC BLOOD PRESSURE: 106 MMHG | TEMPERATURE: 98 F | HEART RATE: 97 BPM | RESPIRATION RATE: 18 BRPM

## 2025-02-19 ENCOUNTER — HOSPITAL ENCOUNTER (OUTPATIENT)
Dept: RADIOLOGY | Facility: OTHER | Age: 50
Discharge: HOME | End: 2025-02-19
Attending: SPECIALIST/TECHNOLOGIST
Payer: OTHER GOVERNMENT

## 2025-02-19 ENCOUNTER — OFFICE VISIT (OUTPATIENT)
Dept: ORTHOPEDICS | Facility: CLINIC | Age: 50
End: 2025-02-19
Payer: OTHER GOVERNMENT

## 2025-02-19 DIAGNOSIS — M24.549 CONTRACTURE OF JOINT OF FINGER, UNSPECIFIED LATERALITY: ICD-10-CM

## 2025-02-19 DIAGNOSIS — M79.642 LEFT HAND PAIN: Primary | ICD-10-CM

## 2025-02-19 DIAGNOSIS — M79.642 LEFT HAND PAIN: ICD-10-CM

## 2025-02-19 DIAGNOSIS — M24.549 CONTRACTURE, UNSPECIFIED HAND: ICD-10-CM

## 2025-02-19 DIAGNOSIS — Z98.890 POST-OPERATIVE STATE: Primary | ICD-10-CM

## 2025-02-19 PROCEDURE — 29085 APPL CAST HAND&LWR FOREARM: CPT | Mod: PBBFAC | Performed by: SPECIALIST/TECHNOLOGIST

## 2025-02-19 PROCEDURE — 99213 OFFICE O/P EST LOW 20 MIN: CPT | Mod: PBBFAC,25 | Performed by: SPECIALIST/TECHNOLOGIST

## 2025-02-19 PROCEDURE — 73130 X-RAY EXAM OF HAND: CPT | Mod: TC,FY,LT

## 2025-02-19 NOTE — PROGRESS NOTES
2/19/25  Mr. Uriarte is here today for a post-operative visit.  He is 14 days status post L Hand PIP Fusions 2-5 by Dr. Lucas on 2/5/25. He reports that he is minimal pain.  He is beginning therapy on Friday. He denies fever, chills, and sweats since the time of the surgery.     Physical exam:    Vitals:    02/19/25 1012   PainSc: 0-No pain     Vital signs are stable, patient is afebrile.  Patient is well dressed and well groomed, no acute distress.  Alert and oriented to person, place, and time.  Post op dressing taken down.  Incision is clean, dry and intact.  There is no erythema or exudate.  There is no sign of any infection. He is NVI. Sutures removed without difficulty.      Assessment:   s/p  L Hand PIP Fusions 2-5         Plan:  Jethro was seen today for post-op evaluation.    Diagnoses and all orders for this visit:    Post-operative state  -     Ambulatory Referral/Consult to Physical/Occupational Therapy; Future    Contracture of joint of finger, unspecified laterality  -     Ambulatory Referral/Consult to Physical/Occupational Therapy; Future    Contracture, unspecified hand  -     Ambulatory referral/consult to Orthopedics    Left hand pain  -     X-Ray Hand Complete Left; Future    PO instruction reviewed and provided to patient  Patient cleared to get wound wet under clean running water.    We will return patient into a ortho plaster splint and refer to therapy to create a custom orthosis for the PIP effusions.    Advised patient no lifting pushing or pulling with the left upper extremity   Educated patient on scar massage   Patient we will follow up in clinic in 4 weeks with repeat left hand x-ray

## 2025-02-21 ENCOUNTER — CLINICAL SUPPORT (OUTPATIENT)
Dept: REHABILITATION | Facility: HOSPITAL | Age: 50
End: 2025-02-21
Payer: OTHER GOVERNMENT

## 2025-02-21 DIAGNOSIS — Z98.890 POST-OPERATIVE STATE: ICD-10-CM

## 2025-02-21 DIAGNOSIS — M25.441 SWELLING OF HAND JOINT, RIGHT: ICD-10-CM

## 2025-02-21 DIAGNOSIS — M24.549 CONTRACTURE OF JOINT OF FINGER, UNSPECIFIED LATERALITY: ICD-10-CM

## 2025-02-21 DIAGNOSIS — M79.642 HAND PAIN, LEFT: ICD-10-CM

## 2025-02-21 DIAGNOSIS — M79.641 HAND PAIN, RIGHT: Primary | ICD-10-CM

## 2025-02-21 DIAGNOSIS — M25.60 RANGE OF MOTION DEFICIT: ICD-10-CM

## 2025-02-21 PROCEDURE — L3808 WHFO, RIGID W/O JOINTS: HCPCS

## 2025-02-26 ENCOUNTER — PATIENT MESSAGE (OUTPATIENT)
Dept: REHABILITATION | Facility: HOSPITAL | Age: 50
End: 2025-02-26

## 2025-02-26 ENCOUNTER — CLINICAL SUPPORT (OUTPATIENT)
Dept: REHABILITATION | Facility: HOSPITAL | Age: 50
End: 2025-02-26
Payer: OTHER GOVERNMENT

## 2025-02-26 DIAGNOSIS — M25.60 RANGE OF MOTION DEFICIT: ICD-10-CM

## 2025-02-26 DIAGNOSIS — M79.642 HAND PAIN, LEFT: Primary | ICD-10-CM

## 2025-02-26 DIAGNOSIS — M24.542 CONTRACTURE OF LEFT HAND: ICD-10-CM

## 2025-02-26 PROCEDURE — 97110 THERAPEUTIC EXERCISES: CPT

## 2025-02-26 PROCEDURE — 97165 OT EVAL LOW COMPLEX 30 MIN: CPT

## 2025-02-26 NOTE — PROGRESS NOTES
Outpatient Rehab    Occupational Therapy Evaluation (only) - Orthotic    Patient Name: Jethro Uriarte III  MRN: [id number]  YOB: 1975  Encounter Date: 2/21/2025    Therapy Diagnosis:   Encounter Diagnoses   Name Primary?    Post-operative state     Contracture of joint of finger, unspecified laterality     Hand pain, right Yes    Range of motion deficit     Swelling of hand joint, right     Hand pain, left      Physician: Mahesh Alcala PA-C    Physician Orders: Eval and Treat  Medical Diagnosis: Left PIP fusion digits 2-5 2/5/25    Visit # / Visits Authorized:  1 / 1   Date of Evaluation:  2/21/2025   Insurance Authorization Period: 2/21/25 to pending   Plan of Care Certification:  2/21/2025 to 3/31/25      Time In: 1000   Time Out: 1045  Total Time: 45   Total Billable Time: 45    Intake Outcome Measure for FOTO Survey    Therapist reviewed FOTO scores for Jethro Uriarte III on 2/21/2025.   FOTO report - see Media section or FOTO account episode details.     Intake Score:  % not assessed          Subjective           Past Medical History/Physical Systems Review:   Jethro Uriarte III  has a past medical history of Bronchospasm, Contracture of right hand, Pneumonia, Polymyositis, and Wheelchair dependence.    Jethro Uriarte III  has a past surgical history that includes Toe amputation (Left); Release of contracture of joint (Right, 6/26/2024); Capsulotomy of joint (Right, 6/26/2024); Revision of scar using Z-plasty (Right, 6/26/2024); closed reduction, fracture, mcp or pip joint, articular (6/26/2024); Removal of hardware from hand (Right, 8/21/2024); and Fusion of interphalangeal joint of finger (Left, 2/5/2025).    Jethro has a current medication list which includes the following prescription(s): acetaminophen, albuterol, albuterol, calcium, docusate sodium, ergocalciferol (vitamin d2), ibuprofen, meloxicam, mirtazapine, mycophenolate mofetil, ondansetron, oxycodone-acetaminophen, rituximab, sodium chloride, and  vitamin d.    Review of patient's allergies indicates:  No Known Allergies     Objective   Orthosis Details  In this visit, actions taken with the first orthosis variety included Dressing change, Fabrication, and Orthosis education. Number issued of this variety was 1.         Orthosis Laterality: Left  Fabrication Status: Custom  Orthosis Type: Static  Orthosis Design: Forearm-based     - Resting hand splint          Orthosis Position: Custom fabricated volar resting hand type orthosis (WHFO L 3808)  to position and protect MP's/IP's in fused position with MP's in neutral ; attached finger dividers with orfit tape for re-alignment of MP's and to prevent ulnar drift.     Orthosis Education  Orthosis education was provided to Patient and Caregiver. The education recipient's understanding level was Verbalizes understanding. Provided instruction to wear the orthosis At all times, May remove in protected settings, At night, Full-time except for hygiene, and Remove for exercise.          OT evaluation 2/26/25; see eval for details           Assessment & Plan   Plan  From an occupational therapy perspective, the patient would benefit from: Skilled Rehab Services    Planned therapy interventions include: Orthotic management and training and Wound care.            Visit Frequency: 1 times In Total          This plan was discussed with Patient and Caregiver.              Patient's spiritual, cultural, and educational needs considered and patient agreeable to plan of care and goals.           Goals:   Active       STG - orthotic only        Patient to be IND with orthotic use, wear and care precautions        Start:  02/26/25    Expected End:  03/31/25                Meghan Peterson, OT, CHT

## 2025-02-26 NOTE — PATIENT INSTRUCTIONS
"[facility] - OCCUPATIONAL THERAPY  HOME EXERCISE PROGRAM   Run hand under hot water or use hot wet compress for 5-10 min in the morning or before the exercises or as needed for pain/stiffness.     Complete the following massages for 5 minutes each, 2x/day.                     Massage up/down scar, across and circular motion to each finger   Complete the following exercises with 10 repetitions each, 3x/day.       AROM: Isolated MCP Flexion / Extension ("Wave")   Bend only your large, bottom knuckles. Hold 3 seconds. Keep the tips of your fingers straight. Straighten fingers.        AROM: Composte Extension ("Finger Lifts")  Lift your finger off of the table one at a time. Hold 3 seconds. Relax your finger.    AROM: Abduction / Adduction  With hand flat on table, spread all fingers apart, then bring them together as close as possible.    Radial finger walking - in picture above, try to walk fingers toward the thumb, one at a time, Index, middle, ring, small, repeat.  Use other hand to help as needed.       AROM: Thumb Composite Flexion   Bend both joints of thumb as far as possible. Try to touch base of little finger.    AROM: Radial Adduction / Abduction  Place your palm flat on the table. Move thumb out to side. Move back alongside index finger.                                             AROM: Opposition   Touch tip of thumb to nail tip of each finger in turn, making an "O" shape.          Therapist: LETITIA Dennis, GUILLE    "

## 2025-02-27 NOTE — PROGRESS NOTES
Outpatient Rehab    Occupational Therapy Evaluation    Patient Name: Jethro Uriarte III  MRN: [id number]  YOB: 1975  Encounter Date: 2/26/2025    Therapy Diagnosis:   Encounter Diagnoses   Name Primary?    Hand pain, left Yes    Contracture of left hand     Range of motion deficit      Physician: Mahesh Alcala PA-C    Physician Orders: Eval and Treat  Medical Diagnosis: Left PIP fusion digits 2-5 2/5/25     Visit # / Visits Authorized:  1 / 12  Date of Evaluation:  2/21/2025   Insurance Authorization Period: 2/21/25 to pending   Plan of Care Certification:  2/21/2025 to 5/22/25     Time In: 1400   Time Out: 1445  Total Time: 45   Total Billable Time: 45    Intake Outcome Measure for FOTO Survey    Therapist reviewed FOTO scores for Jethro Uriarte III on 2/26/2025.   FOTO report - see Media section or FOTO account episode details.     Intake Score:  % NOT ASSESSED     Precautions  Right Upper Extremity Weight-Bearing Status: Non-weight-bearing  Additional Precautions: Surgical    Wheelchair dependant, NO PIP FLEXION    Subjective   History of Present Illness  Jethro is a 49 y.o. male who reports to occupational therapy with a chief concern of PIP joint pain left hand.     The patient reports a medical diagnosis of Z98.890 (ICD-10-CM) - Post-operative state  M24.549 (ICD-10-CM) - Contracture of joint of finger, unspecified laterality.  Patient reports a surgery of left hand PIP joint fusions digit 2-5. Surgery occurred on 02/05/25. Diagnostic tests related to this condition: X-ray.   X-Ray Details: see chart    Dominant Hand: Right  History of Present Condition/Illness: Patient is a 48 yo male known to clinic for previous R hand PIP fusions and MP arthroplasties who now presents with Left hand PIP fusions for digits 2-5;    Activities of Daily Living  Social history was obtained from Patient.       Previously independent with activities of daily living? No  Activities previously needing assistance include  Dressing - lower body and Dressing - upper body.         Patient has rolling seated walker at home but hasn't used since hand surgeries last year.  Shower chair, ramps going into house.      Previously independent with instrumental activities of daily living? No  Activities previously needing assistance include: Driving, Grocery/shopping, Health management, Home establishment and management, and Meal prep. Wife assists patient with ADLs    Pain     Patient reports a current pain level of 2/10. Pain at best is reported as 0/10. Pain at worst is reported as 4/10.   Location: PIP joints left hand digits 2-5  Clinical Progression (since onset): Stable  Pain Qualities: Tenderness, Other (Comment)  Other Pain Qualities: stiffness, tingling with cold weather  Pain-Relieving Factors: Activity modification, Rest, Immobilization         Living Arrangements  Living Situation  Living Arrangements: Spouse/significant other  Support Systems: Spouse/significant other    Equipment/Treatments  Mobility Equipment: Power wheelchair, Wheeled walker        Employment  Employment Status: On disability          Past Medical History/Physical Systems Review:   Jethro Uriarte III  has a past medical history of Bronchospasm, Contracture of right hand, Pneumonia, Polymyositis, and Wheelchair dependence.    Jethro Uriarte III  has a past surgical history that includes Toe amputation (Left); Release of contracture of joint (Right, 6/26/2024); Capsulotomy of joint (Right, 6/26/2024); Revision of scar using Z-plasty (Right, 6/26/2024); closed reduction, fracture, mcp or pip joint, articular (6/26/2024); Removal of hardware from hand (Right, 8/21/2024); and Fusion of interphalangeal joint of finger (Left, 2/5/2025).    Jethro has a current medication list which includes the following prescription(s): acetaminophen, albuterol, albuterol, calcium, docusate sodium, ergocalciferol (vitamin d2), ibuprofen, meloxicam, mirtazapine, mycophenolate mofetil,  ondansetron, oxycodone-acetaminophen, rituximab, sodium chloride, and vitamin d.    Review of patient's allergies indicates:  No Known Allergies     Objective   Wrist/Hand Observations   Patient with 2-3 cm incisions over dorsal PIP joints of left digits 2-5; healing well, no significant edema or hypersensitivity noted. Left index with fixed boutonierre deformity and PIP's now fused; awaiting MP arthroplasties in future; does present with 20* ulnar drift of digits 3-5 primarily.       Wrist/Hand Swelling   Left   MCP Circumference 17.8 cm       Elbow/Forearm Range of Motion   Left Elbow/Forearm   Active (deg)   Forearm Pronation 90   Forearm Supination 50            Wrist Range of Motion  Left Wrist   Active (deg)   Flexion 20   Extension 22   Radial Deviation 0   Ulnar Deviation 15       Digit 2 - Index Finger Active Range of Motion  Left Index Finger   Extension (deg) Flexion (deg) Pain   MCP 45 75     PIP 30 30     DIP 0 0     GONZALEZ: 30     Digit 3 - Middle Finger Active Range of Motion  Left Middle Finger   Extension (deg) Flexion (deg) Pain   MCP 45 70     PIP 35 35     DIP 0 22     GONZALEZ: 47    Digit 4 - Ring Finger Active Range of Motion  Left Ring Finger   Extension (deg) Flexion (deg) Pain   MCP 45 66     PIP 35 35     DIP 0 6     GONZALEZ: 21    Digit 5 - Little Finger Active Range of Motion  Left Little Finger   Extension (deg) Flexion (deg) Pain   MCP 45 70     PIP 35 35     DIP 0 45     GONZALEZ: 70      Left index with fixed/fused boutonierre deformity with +15 hyperextension at IP and fixed PIP flexion of 30* ; Thumb with opposition to index DIP crease only ; Fixed IP hyperextension of thumb with 24* MP flexion.       NOTE: Error noted in evaluation measurements in EPIC as extension lags not showing up correctly for GONZALEZ. Use measurements above.           Treatment:  Therapeutic Exercise  Therapeutic Exercise Activity 1: Wave, thumb Opposition to index and radial finger walking and digit extension with Place and  hold as able to improve digit alignment x 10 reps each, 3x daily    Paraffin in bag positioned over hand prior to therex x 5 min to improve circulation and mobility (no charge)      Education  Education was done with Patient. The patient's learning style includes Demonstration and Pictures/video. The patient Verbalizes understanding.         Instructed patient in HEP and modality use for pain/stiffness including MH prior to exercises to decrease pain, and stiffness; Scar massage 2 x daily to decrease adhesions. Orthotic use reviewed for night use and daily as tolerated; May fabricate hand orthosis to improve positioning for day use.                  Assessment & Plan   Assessment  Jethro presents with a condition of Low complexity.   Presentation of Symptoms: Stable       ADL Limitations : Bathing/showering, Dressing, Functional mobility  IADL Limitations: Community mobility  Leisure Limitations: Leisure exploration, Leisure participation  Functional Limitations: Activity tolerance, Fine motor coordination, Gross motor coordination, Functional mobility, Manipulating objects, Range of motion, Reaching, Transfers         Personal Factors Affecting Prognosis: Physical limitations          Patient Goal for Therapy (OT): Regain more functional hand mobility for using game controllers and ADL assist  Prognosis: Good  Assessment Details: Patient would benefit from skilled OT services to left hand for scar, edema management, orthotic fabrication/fitting/training and gross ROM/strengthening to improve left hand use for leisure and ADL use.     Plan  From an occupational therapy perspective, the patient would benefit from: Skilled Rehab Services    Planned therapy interventions include: Therapeutic exercise, Therapeutic activities, Manual therapy, Orthotic management and training, and Other (Comment). Scar and edema management   Planned modalities to include: Cryotherapy (cold pack), Paraffin bath, and Ultrasound.        Visit  Frequency: 1 times Per Week for 12 Weeks.       This plan was discussed with Patient and Caregiver.   Discussion participants: Agreed Upon Plan of Care             Patient's spiritual, cultural, and educational needs considered and patient agreeable to plan of care and goals.         Goals:   Active       LTG       4. Patient to be able to demonstrate IND and use of game controller by discharge       Start:  02/27/25    Expected End:  05/22/25               LTG's       1. Patient to be IND with HEP & modalities for pain management         Start:  02/27/25    Expected End:  05/22/25            2. Increase MP and wrist ROM 3-10 degrees to increase functional hand use for ADLs/work/leisure activities        Start:  02/27/25    Expected End:  05/22/25            3. Decrease edema .1-.5mm to increase joint mobility/ flexibility for  functional hand use        Start:  02/27/25    Expected End:  05/22/25               STG - orthotic only        Patient to be IND with orthotic use, wear and care precautions        Start:  02/26/25    Expected End:  03/31/25                Meghan Peterson OT, CHT

## 2025-02-28 ENCOUNTER — CLINICAL SUPPORT (OUTPATIENT)
Dept: REHABILITATION | Facility: HOSPITAL | Age: 50
End: 2025-02-28
Payer: OTHER GOVERNMENT

## 2025-02-28 DIAGNOSIS — M25.60 RANGE OF MOTION DEFICIT: ICD-10-CM

## 2025-02-28 DIAGNOSIS — M79.642 HAND PAIN, LEFT: Primary | ICD-10-CM

## 2025-02-28 DIAGNOSIS — M24.542 CONTRACTURE OF LEFT HAND: ICD-10-CM

## 2025-02-28 PROCEDURE — 97110 THERAPEUTIC EXERCISES: CPT

## 2025-02-28 PROCEDURE — 97140 MANUAL THERAPY 1/> REGIONS: CPT

## 2025-03-03 ENCOUNTER — CLINICAL SUPPORT (OUTPATIENT)
Dept: REHABILITATION | Facility: HOSPITAL | Age: 50
End: 2025-03-03
Payer: OTHER GOVERNMENT

## 2025-03-03 DIAGNOSIS — M79.642 HAND PAIN, LEFT: Primary | ICD-10-CM

## 2025-03-03 DIAGNOSIS — M24.542 CONTRACTURE OF LEFT HAND: ICD-10-CM

## 2025-03-03 PROCEDURE — 97018 PARAFFIN BATH THERAPY: CPT

## 2025-03-03 PROCEDURE — 97110 THERAPEUTIC EXERCISES: CPT

## 2025-03-03 PROCEDURE — 97140 MANUAL THERAPY 1/> REGIONS: CPT

## 2025-03-03 NOTE — PROGRESS NOTES
Outpatient Rehab    Occupational Therapy Visit    Patient Name: Jethro Uriarte III  MRN: [id number]  YOB: 1975  Encounter Date: 3/3/2025    Therapy Diagnosis:   Encounter Diagnoses   Name Primary?    Hand pain, left Yes    Contracture of left hand      Physician: Mahesh Alcala PA-C    Physician Orders: Eval and Treat  Medical Diagnosis: Left PIP fusion digits 2-5 2/5/25     Visit # / Visits Authorized:  4 / 12  Date of Evaluation:  2/21/2025   Insurance Authorization Period: 2/21/25 to 12/31/25  Plan of Care Certification:  2/21/2025 to 5/22/25     Time In: 1245   Time Out: 1330  Total Time: 45   Total Billable Time: 45    FOTO: not assessed   Intake Score:  %  Survey Score 1:  %  Survey Score 2:  %         Subjective   I can hold the [employer] controller and my phone, but not the other controller, I want to look at options.  Pain reported as 2/10.      Objective           Treatment:  Therapeutic Exercise  Therapeutic Exercise Activity 1: Wave, thumb Opposition to index and radial finger walking and digit extension with Place and hold as able (and with therapist assistance)  to improve digit alignment x 10 reps each, 3x daily  Therapeutic Exercise Activity 2: Yellow rubberband for index abduction at P1 to strengthen interosseus ; buddy tape index to MF at P1 to decrease ulnar drift with day use.         Manual Therapy  Manual Therapy Activity 1: Scar massage  with massage stick  to decrease adhesions and improve tensile glide; mepitac    Modalities  Paraffin Bath: Paraffin bath x 10 min to  hand to increase blood flow, circulation and tissue elasticity prior to therex.     Assessment & Plan   Assessment: Patient would benefit from continued OT services to address scar, edema management and ROM/strength, and orthotic fabrication and modification as needed.  Index improving well, ulnar drift and EDC strength limiting functional hand use. May consider day ulnar drift orthotic fabrication.       Patient will  continue to benefit from skilled outpatient occupational therapy to address the deficits listed in the problem list box on initial evaluation, provide pt/family education and to maximize pt's level of independence in the home and community environment.     Patient's spiritual, cultural, and educational needs considered and patient agreeable to plan of care and goals.           Plan: Cont OT for ROM, scar and edema management to improve functional alignment and use    Goals:   Active       LTG       4. Patient to be able to demonstrate IND and use of game controller by discharge (Progressing)       Start:  02/27/25    Expected End:  05/22/25               LTG's       1. Patient to be IND with HEP & modalities for pain management   (Progressing)       Start:  02/27/25    Expected End:  05/22/25            2. Increase MP and wrist ROM 3-10 degrees to increase functional hand use for ADLs/work/leisure activities  (Progressing)       Start:  02/27/25    Expected End:  05/22/25            3. Decrease edema .1-.5mm to increase joint mobility/ flexibility for  functional hand use  (Progressing)       Start:  02/27/25    Expected End:  05/22/25               STG - orthotic only        Patient to be IND with orthotic use, wear and care precautions  (Progressing)       Start:  02/26/25    Expected End:  03/31/25                Meghan Peterson OT, CHT

## 2025-03-05 NOTE — PROGRESS NOTES
Outpatient Rehab    Occupational Therapy Visit    Patient Name: Jethro Uriarte III  MRN: [id number]  YOB: 1975  Encounter Date: 2/28/2025    Therapy Diagnosis:   Encounter Diagnoses   Name Primary?    Hand pain, left Yes    Contracture of left hand     Range of motion deficit      Physician: Mahesh Alcala PA-C    Physician Orders: Eval and Treat  Medical Diagnosis: Left PIP fusion digits 2-5 2/5/25     Visit # / Visits Authorized:  1 / 12  Date of Evaluation:  2/21/2025 *  Insurance Authorization Period: 2/21/25 to 12/31/25  Plan of Care Certification:  2/21/2025 to 5/22/25     Time In: 1245   Time Out: 1330  Total Time: 45   Total Billable Time: 45    FOTO: not assessed   Intake Score:  %  Survey Score 1:  %  Survey Score 2:  %         Subjective   I'm trying to use it to hold my phone, just a little tender, but otherwise doing ok.  Pain reported as 2/10.      Objective           Treatment:  Therapeutic Exercise  Therapeutic Exercise Activity 1: Wave, thumb Opposition to index and radial finger walking (with assistance as needed)  and digit extension with Place and hold as able to improve digit alignment x 10 reps each, 3x daily  Therapeutic Exercise Activity 2: Yellow rubberband for index abduction to strengthen interosseus ; buddy tape index to MF at P1 to decrease ulnar drift with day use.         Manual Therapy  Manual Therapy Activity 1: Scar massage  with massage stick and cupping/suction cup to decrease adhesions and improve tensile glide    Modalities  Moist Heat (min): Paraffin placed in bag and placed on top of incisions to increase blood flow, circulation and tissue elasticity (no charge)     Assessment & Plan   Assessment: Wounds healing well, mild adhesions noted. Ulnar drift remains most limiting; Unable to maintain digit extension digits 3-5       Patient will continue to benefit from skilled outpatient occupational therapy to address the deficits listed in the problem list box on initial  evaluation, provide pt/family education and to maximize pt's level of independence in the home and community environment.     Patient's spiritual, cultural, and educational needs considered and patient agreeable to plan of care and goals.           Plan: Cont OT for ROM, scar and edema management to improve functional alignment and use    Goals:   Active       LTG       4. Patient to be able to demonstrate IND and use of game controller by discharge (Progressing)       Start:  02/27/25    Expected End:  05/22/25               LTG's       1. Patient to be IND with HEP & modalities for pain management   (Progressing)       Start:  02/27/25    Expected End:  05/22/25            2. Increase MP and wrist ROM 3-10 degrees to increase functional hand use for ADLs/work/leisure activities  (Progressing)       Start:  02/27/25    Expected End:  05/22/25            3. Decrease edema .1-.5mm to increase joint mobility/ flexibility for  functional hand use  (Progressing)       Start:  02/27/25    Expected End:  05/22/25               STG - orthotic only        Patient to be IND with orthotic use, wear and care precautions  (Progressing)       Start:  02/26/25    Expected End:  03/31/25                Meghan Peterson, OT, CHT

## 2025-03-06 ENCOUNTER — CLINICAL SUPPORT (OUTPATIENT)
Dept: REHABILITATION | Facility: HOSPITAL | Age: 50
End: 2025-03-06
Payer: OTHER GOVERNMENT

## 2025-03-06 DIAGNOSIS — M79.642 HAND PAIN, LEFT: Primary | ICD-10-CM

## 2025-03-06 PROCEDURE — 97110 THERAPEUTIC EXERCISES: CPT

## 2025-03-06 PROCEDURE — 97018 PARAFFIN BATH THERAPY: CPT

## 2025-03-06 PROCEDURE — 97140 MANUAL THERAPY 1/> REGIONS: CPT

## 2025-03-06 NOTE — PROGRESS NOTES
Outpatient Rehab    Occupational Therapy Visit    Patient Name: Jethro Uriarte III  MRN: [id number]  YOB: 1975  Encounter Date: 3/6/2025    Therapy Diagnosis:   Encounter Diagnosis   Name Primary?    Hand pain, left Yes     Physician: Mahesh Alcala PA-C    Physician Orders: Eval and Treat  Medical Diagnosis: Left PIP fusion digits 2-5 2/5/25     Visit # / Visits Authorized:  5 / 12  Date of Evaluation:  2/21/2025   Insurance Authorization Period: 2/21/25 to 12/31/25  Plan of Care Certification:  2/21/2025 to 5/22/25     Time In: 1345   Time Out: 1430  Total Time: 45   Total Billable Time: 45 minutes     FOTO:  Intake Score:  %  Survey Score 1:  %  Survey Score 2:  %         Subjective   It only hurts a little bit when it's kindof cold outside..  Pain reported as 2/10.      Objective           Treatment:  Therapeutic Exercise  Therapeutic Exercise Activity 1: Wave, thumb Opposition to index and radial finger walking (with assistance as needed)  and digit extension with Place and hold as able to improve digit alignment x 10 reps each, 3x daily  Therapeutic Exercise Activity 2: Yellow rubberband for index abduction to strengthen interosseus ; buddy tape index to MF at P1 to decrease ulnar drift with day use.  Therapeutic Exercise Activity 3: pink putty with intrinsic scrape tool with therapist guiding         Manual Therapy  Manual Therapy Activity 1: Scar massage  with massage stick and cupping/suction cup to decrease adhesions and improve tensile glide    Modalities  Moist Heat (min): Paraffin placed in bag and placed on top of incisions to increase blood flow, circulation and tissue elasticity (no charge)     Assessment & Plan   Assessment: Primary limitation continues to be ulnar drift and maintaining digit extension. It does seem like he is getting improvement with MP ROM. Ongoing focus on passive rom, radial walking, and intrinisc strengthening. Patient would benefit from ongoing skilled OT  services.  Evaluation/Treatment Tolerance: Patient tolerated treatment well    Patient will continue to benefit from skilled outpatient occupational therapy to address the deficits listed in the problem list box on initial evaluation, provide pt/family education and to maximize pt's level of independence in the home and community environment.     Patient's spiritual, cultural, and educational needs considered and patient agreeable to plan of care and goals.           Plan: Cont OT for ROM, scar and edema management to improve functional alignment and use    Goals:   Active       LTG       4. Patient to be able to demonstrate IND and use of game controller by discharge (Progressing)       Start:  02/27/25    Expected End:  05/22/25               LTG's       1. Patient to be IND with HEP & modalities for pain management   (Progressing)       Start:  02/27/25    Expected End:  05/22/25            2. Increase MP and wrist ROM 3-10 degrees to increase functional hand use for ADLs/work/leisure activities  (Progressing)       Start:  02/27/25    Expected End:  05/22/25            3. Decrease edema .1-.5mm to increase joint mobility/ flexibility for  functional hand use  (Progressing)       Start:  02/27/25    Expected End:  05/22/25               STG - orthotic only        Patient to be IND with orthotic use, wear and care precautions  (Progressing)       Start:  02/26/25    Expected End:  03/31/25                Rupinder Bush OT

## 2025-03-10 ENCOUNTER — CLINICAL SUPPORT (OUTPATIENT)
Dept: REHABILITATION | Facility: HOSPITAL | Age: 50
End: 2025-03-10
Payer: OTHER GOVERNMENT

## 2025-03-10 DIAGNOSIS — M79.642 HAND PAIN, LEFT: Primary | ICD-10-CM

## 2025-03-10 PROCEDURE — 97140 MANUAL THERAPY 1/> REGIONS: CPT

## 2025-03-10 PROCEDURE — 97110 THERAPEUTIC EXERCISES: CPT

## 2025-03-10 NOTE — PROGRESS NOTES
"  Outpatient Rehab    Occupational Therapy Visit    Patient Name: Jethro Uriarte III  MRN: [id number]  YOB: 1975  Encounter Date: 3/10/2025    Therapy Diagnosis:   Encounter Diagnosis   Name Primary?    Hand pain, left Yes     Physician: Mahesh Alcala PA-C    Physician Orders: Eval and Treat  Medical Diagnosis: Left PIP fusion digits 2-5 2/5/25     Visit # / Visits Authorized:  6 / 12  Date of Evaluation:  2/21/2025   Insurance Authorization Period: 2/21/25 to 12/31/25  Plan of Care Certification:  2/21/2025 to 5/22/25     Time In: 1450   Time Out: 1530  Total Time: 40   Total Billable Time: 40    FOTO:  Intake Score:  %  Survey Score 1:  %  Survey Score 2:  %         Subjective   "It;s just a little sore bc it's cold outside.".  Pain reported as 2/10.      Objective           Treatment:  Therapeutic Exercise  Therapeutic Exercise Activity 1: Wave, thumb Opposition to index and radial finger walking (with assistance as needed)  and digit extension with Place and hold as able to improve digit alignment x 10 reps each, 3x daily  Therapeutic Exercise Activity 2: Yellow rubberband for index abduction to strengthen interosseus ; buddy tape index to MF at P1 to decrease ulnar drift with day use.  Therapeutic Exercise Activity 3: pink putty with intrinsic scrape tool with therapist guiding  Therapeutic Exercise Activity 4: opposition  with poms alternate IF/LF  Therapeutic Exercise Activity 5: towel scrunch and spread with guidance         Manual Therapy  Manual Therapy Activity 1: Scar massage  with massage stick and cupping/suction cup to decrease adhesions and improve tensile glide    Modalities  Moist Heat (min): Paraffin placed in bag and placed on top of incisions to increase blood flow, circulation and tissue elasticity (no charge)     Assessment & Plan   Assessment: Primary limitation continues to be ulnar drift and maintaining digit extension. Patient admits to not wearing buddy strap t/o the day to " help with ulnar drift, so I provided him with a new one and encouraged to use with daily activities. Ongoing focus on passive rom, radial walking, and intrinisc strengthening. Patient would benefit from ongoing skilled OT services.  Evaluation/Treatment Tolerance: Patient tolerated treatment well    Patient will continue to benefit from skilled outpatient occupational therapy to address the deficits listed in the problem list box on initial evaluation, provide pt/family education and to maximize pt's level of independence in the home and community environment.     Patient's spiritual, cultural, and educational needs considered and patient agreeable to plan of care and goals.           Plan: Cont OT for ROM, scar and edema management to improve functional alignment and use    Goals:   Active       LTG       4. Patient to be able to demonstrate IND and use of game controller by discharge (Progressing)       Start:  02/27/25    Expected End:  05/22/25               LTG's       1. Patient to be IND with HEP & modalities for pain management   (Progressing)       Start:  02/27/25    Expected End:  05/22/25            2. Increase MP and wrist ROM 3-10 degrees to increase functional hand use for ADLs/work/leisure activities  (Progressing)       Start:  02/27/25    Expected End:  05/22/25            3. Decrease edema .1-.5mm to increase joint mobility/ flexibility for  functional hand use  (Progressing)       Start:  02/27/25    Expected End:  05/22/25               STG - orthotic only        Patient to be IND with orthotic use, wear and care precautions  (Progressing)       Start:  02/26/25    Expected End:  03/31/25                Rupinder Bush OT

## 2025-03-13 ENCOUNTER — CLINICAL SUPPORT (OUTPATIENT)
Dept: REHABILITATION | Facility: HOSPITAL | Age: 50
End: 2025-03-13
Payer: OTHER GOVERNMENT

## 2025-03-13 DIAGNOSIS — M79.642 HAND PAIN, LEFT: Primary | ICD-10-CM

## 2025-03-13 PROCEDURE — 97110 THERAPEUTIC EXERCISES: CPT

## 2025-03-13 PROCEDURE — 97018 PARAFFIN BATH THERAPY: CPT

## 2025-03-13 PROCEDURE — 97140 MANUAL THERAPY 1/> REGIONS: CPT

## 2025-03-17 ENCOUNTER — CLINICAL SUPPORT (OUTPATIENT)
Dept: REHABILITATION | Facility: HOSPITAL | Age: 50
End: 2025-03-17
Payer: OTHER GOVERNMENT

## 2025-03-17 ENCOUNTER — PATIENT MESSAGE (OUTPATIENT)
Dept: REHABILITATION | Facility: HOSPITAL | Age: 50
End: 2025-03-17

## 2025-03-17 ENCOUNTER — HOSPITAL ENCOUNTER (OUTPATIENT)
Dept: RADIOLOGY | Facility: OTHER | Age: 50
Discharge: HOME | End: 2025-03-17
Attending: SPECIALIST/TECHNOLOGIST
Payer: OTHER GOVERNMENT

## 2025-03-17 ENCOUNTER — OFFICE VISIT (OUTPATIENT)
Dept: ORTHOPEDICS | Facility: CLINIC | Age: 50
End: 2025-03-17
Payer: OTHER GOVERNMENT

## 2025-03-17 DIAGNOSIS — M79.642 HAND PAIN, LEFT: Primary | ICD-10-CM

## 2025-03-17 DIAGNOSIS — Z98.890 POST-OPERATIVE STATE: Primary | ICD-10-CM

## 2025-03-17 DIAGNOSIS — M79.642 LEFT HAND PAIN: ICD-10-CM

## 2025-03-17 DIAGNOSIS — M24.549 CONTRACTURE OF JOINT OF FINGER, UNSPECIFIED LATERALITY: ICD-10-CM

## 2025-03-17 PROCEDURE — 73130 X-RAY EXAM OF HAND: CPT | Mod: 26,LT,, | Performed by: RADIOLOGY

## 2025-03-17 PROCEDURE — 97010 HOT OR COLD PACKS THERAPY: CPT

## 2025-03-17 PROCEDURE — 97140 MANUAL THERAPY 1/> REGIONS: CPT

## 2025-03-17 PROCEDURE — 99213 OFFICE O/P EST LOW 20 MIN: CPT | Mod: PBBFAC,25 | Performed by: SPECIALIST/TECHNOLOGIST

## 2025-03-17 PROCEDURE — 99999 PR PBB SHADOW E&M-EST. PATIENT-LVL III: CPT | Mod: PBBFAC,,, | Performed by: SPECIALIST/TECHNOLOGIST

## 2025-03-17 PROCEDURE — 97110 THERAPEUTIC EXERCISES: CPT

## 2025-03-17 PROCEDURE — 99024 POSTOP FOLLOW-UP VISIT: CPT | Mod: ,,, | Performed by: SPECIALIST/TECHNOLOGIST

## 2025-03-17 PROCEDURE — 73130 X-RAY EXAM OF HAND: CPT | Mod: TC,FY,LT

## 2025-03-17 NOTE — PROGRESS NOTES
3/17/25  Patient reports 6 weeks status post left hand PIP joint fusions of digits 2 through 5.  He has been attending therapy regularly as well as performing his home exercise program.  He states that he has been somewhat compliant with wearing his anti ulnar drift brace at nighttime.  He denies any numbness or tingling or increase in pain.    2/19/25  Mr. Uriarte is here today for a post-operative visit.  He is 14 days status post L Hand PIP Fusions 2-5 by Dr. Lucas on 2/5/25. He reports that he is minimal pain.  He is beginning therapy on Friday. He denies fever, chills, and sweats since the time of the surgery.     Physical exam:    Vitals:    03/17/25 1008   PainSc:   2   PainLoc: Hand     Vital signs are stable, patient is afebrile.  Patient is well dressed and well groomed, no acute distress.  Alert and oriented to person, place, and time.  Incision is clean, dry and intact.  There is no erythema or exudate.  There is no sign of any infection. He is NVI.  Patient has extensor lag of about 20° of the MCP 2 through 5 as well as a ulnar drift of about 40° of the MCP joints.    Assessment:   s/p  L Hand PIP Fusions 2-5         Plan:  Jethro was seen today for post-op evaluation and pain.    Diagnoses and all orders for this visit:    Post-operative state    Left hand pain  -     X-Ray Hand Complete Left; Future    Contracture of joint of finger, unspecified laterality    Continue with regular therapy   Advised patient on the importance of compliance wearing his anti ulnar drift brace at nighttime   Discussed with the patient we will be reaching out to Dr. Rosa to discuss the MCP arthroplasties.    Patient we will follow up in clinic in 6 weeks with repeat right hand x-rays.

## 2025-03-17 NOTE — PROGRESS NOTES
"  Outpatient Rehab    Occupational Therapy Visit    Patient Name: Jethro Uriarte III  MRN: [id number]  YOB: 1975  Encounter Date: 3/17/2025    Therapy Diagnosis:   Encounter Diagnosis   Name Primary?    Hand pain, left Yes     Physician: Mahesh Alcala PA-C    Physician Orders: Eval and Treat  Medical Diagnosis: Post-operative state  Contracture of joint of finger, unspecified laterality    Medical Diagnosis: Left PIP fusion digits 2-5 2/5/25     Visit # / Visits Authorized:  8 / 12  Date of Evaluation:  2/21/2025   Insurance Authorization Period: 2/21/25 to 12/31/25  Plan of Care Certification:  2/21/2025 to 5/22/25     Time In: 0915   Time Out: 1000  Total Time: 45   Total Billable Time: 45 minutes      FOTO:  Intake Score:  %  Survey Score 1:  %  Survey Score 2:  %         Subjective   "It's feeling alright.".  Pain reported as 3/10.      Objective      Digit 2 - Index Finger Active Range of Motion  Left Index Finger   Extension (deg) Flexion (deg) Pain   MCP -25 75     PIP -30       DIP 0       GONZALEZ:      Digit 3 - Middle Finger Active Range of Motion  Left Middle Finger   Extension (deg) Flexion (deg) Pain   MCP -45 72     PIP         DIP         GONZALEZ:      Digit 4 - Ring Finger Active Range of Motion  Left Ring Finger   Extension (deg) Flexion (deg) Pain   MCP -45 70     PIP         DIP         GONZALEZ:      Digit 5 - Little Finger Active Range of Motion  Left Little Finger   Extension (deg) Flexion (deg) Pain   MCP -45 70     PIP         DIP         GONZALEZ:                          Treatment:  Therapeutic Exercise  TE 1: Wave, thumb Opposition to index and radial finger walking (with assistance as needed)  and digit extension with Place and hold as able to improve digit alignment x 10 reps each, 3x daily  TE 2: Yellow rubberband for index abduction to strengthen interosseus ; buddy tape index to MF at P1 to decrease ulnar drift with day use.  TE 3: pink putty with intrinsic scrape tool with therapist " guiding  TE 4: opposition  with poms alternate IF/LF  TE 5: towel scrunch and spread with guidance  TE 6: Digi flex (red)  Modalities  Moist Heat (min): Paraffin placed in bag and placed on top of incisions to increase blood flow, circulation and tissue elasticity (no charge)    Time Entry(in minutes):  Hot/Cold Pack Time Entry: 10  Manual Therapy Time Entry: 15  Therapeutic Exercise Time Entry: 30    Assessment & Plan   Assessment: Primary limitation continues to be ulnar drift and maintaining digit extension. Ongoing focus on passive rom, radial walking, and intrinisc strengthening. Patient states that he has been able to play most video games which is his main source of leisure enjoyment. Returns to MD following today's visit to discuss ongoing condition. He continues to say that he forgets to wear his shana strap but will place sponges in between his fingers t/o the day to allow some separation and pain relief with the ulnar drift. Patient would benefit from ongoing skilled OT services.  Evaluation/Treatment Tolerance: Patient tolerated treatment well    Patient will continue to benefit from skilled outpatient occupational therapy to address the deficits listed in the problem list box on initial evaluation, provide pt/family education and to maximize pt's level of independence in the home and community environment.     Patient's spiritual, cultural, and educational needs considered and patient agreeable to plan of care and goals.           Plan: Cont OT for ROM, scar and edema management to improve functional alignment and use    Goals:   Active       LTG       4. Patient to be able to demonstrate IND and use of game controller by discharge (Progressing)       Start:  02/27/25    Expected End:  05/22/25               LTG's       1. Patient to be IND with HEP & modalities for pain management   (Progressing)       Start:  02/27/25    Expected End:  05/22/25            2. Increase MP and wrist ROM 3-10  degrees to increase functional hand use for ADLs/work/leisure activities  (Progressing)       Start:  02/27/25    Expected End:  05/22/25            3. Decrease edema .1-.5mm to increase joint mobility/ flexibility for  functional hand use  (Progressing)       Start:  02/27/25    Expected End:  05/22/25               STG - orthotic only        Patient to be IND with orthotic use, wear and care precautions  (Progressing)       Start:  02/26/25    Expected End:  03/31/25                Rupinder Bush OT

## 2025-03-20 NOTE — PROGRESS NOTES
Outpatient Rehab    Occupational Therapy Visit    Patient Name: Jethro Uriarte III  MRN: [id number]  YOB: 1975  Encounter Date: 3/13/2025    Therapy Diagnosis:   Encounter Diagnosis   Name Primary?    Hand pain, left Yes     Physician: Mahesh Alcala PA-C    Physician Orders: Eval and Treat  Medical Diagnosis: Post-operative state  Contracture of joint of finger, unspecified laterality    Visit # / Visits Authorized: 7 / 14  Date of Evaluation:  2/21/25  Insurance Authorization Period: 1/21/2025 to 8/4/2025  Plan of Care Certification:  2/21/2025 to 5/22/25      Time In: 1115   Time Out: 1200  Total Time: 45   Total Billable Time: 45    FOTO: not assessed   Intake Score:  %  Survey Score 1:  %  Survey Score 2:  %         Subjective   It's doing ok, I can hold my phone and one of the game controllers, I'm trying to work on the push buttons on top with index.  Pain reported as 2/10.      Objective           Treatment:  Therapeutic Exercise  TE 1: Wave, thumb Opposition to index and radial finger walking (with assistance as needed)  and digit extension with Place and hold as able to improve digit alignment x 10 reps each, 3x daily  TE 2: Yellow rubberband for index abduction to strengthen interosseus and digit extension ; buddy tape index to MF at P1 to decrease ulnar drift with day use.  TE 4: opposition  with poms alternate IF/LF  TE 6: Digi flex (red) x 10 reps to simulate button controller on game console  Manual Therapy  MT 1: Scar massage  with massage stick and cupping/suction cup to decrease adhesions and improve tensile glide  MT 2: mepitac to scar adhesions  Modalities  Paraffin Bath: Paraffin bath x 10 min to  hand to increase blood flow, circulation and tissue elasticity prior to therex.    Time Entry(in minutes):  Paraffin Bath Time Entry: 10  Manual Therapy Time Entry: 15  Therapeutic Exercise Time Entry: 20    Assessment & Plan   Assessment: Ulnar drift of MP's remain; awaiting surgical  consult; gross grasp improving for holding phone and game controller; Pain manageable.  Attempting shana taping index to middle for stability and to prevent ulnar push of other digits.  Evaluation/Treatment Tolerance: Patient tolerated treatment well    Patient will continue to benefit from skilled outpatient occupational therapy to address the deficits listed in the problem list box on initial evaluation, provide pt/family education and to maximize pt's level of independence in the home and community environment.     Patient's spiritual, cultural, and educational needs considered and patient agreeable to plan of care and goals.           Plan: Cont OT for ROM, scar and edema management to improve functional alignment and use    Goals:   Active       LTG       4. Patient to be able to demonstrate IND and use of game controller by discharge (Progressing)       Start:  02/27/25    Expected End:  05/22/25               LTG's       1. Patient to be IND with HEP & modalities for pain management   (Progressing)       Start:  02/27/25    Expected End:  05/22/25            2. Increase MP and wrist ROM 3-10 degrees to increase functional hand use for ADLs/work/leisure activities  (Progressing)       Start:  02/27/25    Expected End:  05/22/25            3. Decrease edema .1-.5mm to increase joint mobility/ flexibility for  functional hand use  (Progressing)       Start:  02/27/25    Expected End:  05/22/25               STG - orthotic only        Patient to be IND with orthotic use, wear and care precautions  (Progressing)       Start:  02/26/25    Expected End:  03/31/25                Meghan Peterson, OT, CHT

## 2025-03-24 ENCOUNTER — CLINICAL SUPPORT (OUTPATIENT)
Dept: REHABILITATION | Facility: HOSPITAL | Age: 50
End: 2025-03-24
Payer: OTHER GOVERNMENT

## 2025-03-24 DIAGNOSIS — M79.642 HAND PAIN, LEFT: Primary | ICD-10-CM

## 2025-03-24 PROCEDURE — 97140 MANUAL THERAPY 1/> REGIONS: CPT

## 2025-03-24 PROCEDURE — 97110 THERAPEUTIC EXERCISES: CPT

## 2025-03-24 PROCEDURE — 97018 PARAFFIN BATH THERAPY: CPT

## 2025-03-24 NOTE — PROGRESS NOTES
Outpatient Rehab    Occupational Therapy Visit    Patient Name: Jethro Uriarte III  MRN: [id number]  YOB: 1975  Encounter Date: 3/24/2025    Therapy Diagnosis:   Encounter Diagnosis   Name Primary?    Hand pain, left Yes     Physician: Mahesh Alcala PA-C    Physician Orders: Eval and Treat  Medical Diagnosis: Post-operative state  Contracture of joint of finger, unspecified laterality    Visit # / Visits Authorized: 8 / 14  Insurance Authorization Period: 1/21/2025 to 8/4/2025  Date of Evaluation:     02/21/2025     Plan of Care Certification: 2/26/2025 - 3/31/2025      Time In: 1045   Time Out: 1130  Total Time: 45   Total Billable Time:      FOTO:  Intake Score:  %  Survey Score 1:  %  Survey Score 2:  %         Subjective             Objective           Treatment:  Therapeutic Exercise  TE 1: Wave, thumb Opposition to index and radial finger walking (with assistance as needed)  and digit extension with Place and hold as able to improve digit alignment x 10 reps each, 3x daily  TE 2: Yellow rubberband for index abduction to strengthen interosseus and digit extension ; buddy tape index to MF at P1 to decrease ulnar drift with day use.  TE 3: pink putty with intrinsic scrape tool with therapist guiding  TE 4: opposition  with poms alternate IF/LF  TE 5: towel scrunch and spread with guidance  TE 6: Digi flex (red) x 10 reps to simulate button controller on game console  Manual Therapy  MT 1: Scar massage  with massage stick and cupping/suction cup to decrease adhesions and improve tensile glide  Modalities  Paraffin Bath: x10 minutes - For improved circulation and increased tissue flexibility prior to exercises    Time Entry(in minutes):  Paraffin Bath Time Entry: 10  Manual Therapy Time Entry: 15  Therapeutic Exercise Time Entry: 20    Assessment & Plan   Assessment: Pain continues to be managble at all digits. Index digit remains in fair alignment, all other digits with significnat ulnar drift at  MPs. Awaiting surgical follow up and consult for progression with next steps.  Evaluation/Treatment Tolerance: Patient tolerated treatment well    Patient will continue to benefit from skilled outpatient occupational therapy to address the deficits listed in the problem list box on initial evaluation, provide pt/family education and to maximize pt's level of independence in the home and community environment.     Patient's spiritual, cultural, and educational needs considered and patient agreeable to plan of care and goals.           Plan: Cont OT for ROM, scar and edema management to improve functional alignment and use    Goals:   Active       LTG       4. Patient to be able to demonstrate IND and use of game controller by discharge (Progressing)       Start:  02/27/25    Expected End:  05/22/25               LTG's       1. Patient to be IND with HEP & modalities for pain management   (Progressing)       Start:  02/27/25    Expected End:  05/22/25            2. Increase MP and wrist ROM 3-10 degrees to increase functional hand use for ADLs/work/leisure activities  (Progressing)       Start:  02/27/25    Expected End:  05/22/25            3. Decrease edema .1-.5mm to increase joint mobility/ flexibility for  functional hand use  (Progressing)       Start:  02/27/25    Expected End:  05/22/25               STG - orthotic only        Patient to be IND with orthotic use, wear and care precautions  (Progressing)       Start:  02/26/25    Expected End:  03/31/25                Carmen Zamora OT

## 2025-04-08 ENCOUNTER — CLINICAL SUPPORT (OUTPATIENT)
Dept: REHABILITATION | Facility: HOSPITAL | Age: 50
End: 2025-04-08
Payer: OTHER GOVERNMENT

## 2025-04-08 DIAGNOSIS — M79.642 HAND PAIN, LEFT: Primary | ICD-10-CM

## 2025-04-08 PROCEDURE — 97140 MANUAL THERAPY 1/> REGIONS: CPT

## 2025-04-08 PROCEDURE — 97110 THERAPEUTIC EXERCISES: CPT

## 2025-04-08 PROCEDURE — 97018 PARAFFIN BATH THERAPY: CPT

## 2025-04-09 NOTE — PROGRESS NOTES
Outpatient Rehab    Occupational Therapy Visit    Patient Name: Jethro Uriarte III  MRN: [id number]  YOB: 1975  Encounter Date: 4/8/2025    Therapy Diagnosis:   Encounter Diagnosis   Name Primary?    Hand pain, left Yes     Physician: Mahesh Alcala PA-C    Physician Orders: Eval and Treat  Medical Diagnosis: Post-operative state  Contracture of joint of finger, unspecified laterality    Visit # / Visits Authorized: 9 / 14  Insurance Authorization Period: 1/21/2025 to 8/4/2025  Date of Evaluation:  2/21/2025   Plan of Care Certification:  2/21/2025 to 5/22/25      Time In: 1115   Time Out: 1200  Total Time: 45   Total Billable Time:  45    FOTO: not assessed   Intake Score:  %  Survey Score 1:  %  Survey Score 2:  %         Subjective   I mostly play video games on saturday, still just using the one controller.  Pain reported as 2/10.      Objective           Treatment:  Therapeutic Exercise  TE 1: Wave, thumb Opposition to index and radial finger walking (with assistance as needed)  and digit extension with Place and hold as able to improve digit alignment x 10 reps each, 3x daily  TE 2: Yellow rubberband for index abduction to strengthen interosseus and digit extension ; buddy tape index to MF at P1 to decrease ulnar drift with day use.  TE 3: Pink sponges b/w digits for digit adduction x 10 reps , and gross  x 10  TE 4: opposition  with poms alternate IF/LF  TE 5: Gross  pink sponge x 10 reps  TE 6: Digi flex (red) x 10 reps to simulate button controller on game console  Manual Therapy  MT 1: Scar massage  with massage stick  to decrease adhesions and improve tensile glide  MT 2: Passive stretch of intrinsics to improve digit alignment  Modalities  Paraffin Bath: Paraffin bath x 10 min to  hand to increase blood flow, circulation and tissue elasticity prior to therex.    Time Entry(in minutes):  Paraffin Bath Time Entry: 10  Manual Therapy Time Entry: 15  Therapeutic Exercise Time Entry:  20    Assessment & Plan   Assessment: Pain continues to be managble at all digits. Index digit remains in fair alignment, all other digits with significnat ulnar drift at MPs. Awaiting surgical follow up and consult for progression with next steps at 6 months per PA; Attempting sahna taping of IF-MF to decrease ulnar drift.  Evaluation/Treatment Tolerance: Patient tolerated treatment well    Patient will continue to benefit from skilled outpatient occupational therapy to address the deficits listed in the problem list box on initial evaluation, provide pt/family education and to maximize pt's level of independence in the home and community environment.     Patient's spiritual, cultural, and educational needs considered and patient agreeable to plan of care and goals.           Plan: Cont OT for ROM, scar and edema management to improve functional alignment and use    Goals:   Active       LTG       4. Patient to be able to demonstrate IND and use of game controller by discharge (Progressing)       Start:  02/27/25    Expected End:  05/22/25               LTG's       1. Patient to be IND with HEP & modalities for pain management   (Met)       Start:  02/27/25    Expected End:  05/22/25    Resolved:  04/09/25         2. Increase MP and wrist ROM 3-10 degrees to increase functional hand use for ADLs/work/leisure activities  (Progressing)       Start:  02/27/25    Expected End:  05/22/25            3. Decrease edema .1-.5mm to increase joint mobility/ flexibility for  functional hand use  (Progressing)       Start:  02/27/25    Expected End:  05/22/25              Resolved       STG - orthotic only        Patient to be IND with orthotic use, wear and care precautions  (Met)       Start:  02/26/25    Expected End:  03/31/25    Resolved:  04/09/25             Meghan Peterson, OT, CHT

## 2025-04-15 ENCOUNTER — CLINICAL SUPPORT (OUTPATIENT)
Dept: REHABILITATION | Facility: HOSPITAL | Age: 50
End: 2025-04-15
Payer: OTHER GOVERNMENT

## 2025-04-15 DIAGNOSIS — M79.642 HAND PAIN, LEFT: Primary | ICD-10-CM

## 2025-04-15 PROCEDURE — 97018 PARAFFIN BATH THERAPY: CPT

## 2025-04-15 PROCEDURE — 97110 THERAPEUTIC EXERCISES: CPT

## 2025-04-15 PROCEDURE — 97140 MANUAL THERAPY 1/> REGIONS: CPT

## 2025-04-17 NOTE — PROGRESS NOTES
Outpatient Rehab    Occupational Therapy Visit    Patient Name: Jethro Uriarte III  MRN: [id number]  YOB: 1975  Encounter Date: 4/15/2025    Therapy Diagnosis:   Encounter Diagnosis   Name Primary?    Hand pain, left Yes     Physician: Mahesh Alcala PA-C    Physician Orders: Eval and Treat  Medical Diagnosis: Post-operative state  Contracture of joint of finger, unspecified laterality    Visit # / Visits Authorized: 10 / 14  Insurance Authorization Period: 1/21/2025 to 8/4/2025  Date of Evaluation:  2/21/2025   Plan of Care Certification:  2/21/2025 to 5/22/25      Time In: 1115   Time Out: 1200  Total Time: 45   Total Billable Time:  45    FOTO: not assessed   Intake Score:  %  Survey Score 1:  %  Survey Score 2:  %         Subjective   I mostly play video games on saturday, still just using the one controller, but I'm using my thumb better.  Pain reported as 2/10.      Objective           Treatment:  Therapeutic Exercise  TE 1: Wave, thumb Opposition to index and radial finger walking (with assistance as needed)  and digit extension with Place and hold as able to improve digit alignment x 10 reps each, 3x daily  TE 2: Yellow rubberband for individual digit abduction to strengthen interosseus and digit extension ; buddy tape index to MF at P1 to decrease ulnar drift with day use.  TE 3: Digi flex (red) x 10 reps to simulate button controller on game console  TE 4: 2, 4 adn 6# clothes with poms alternate for pinch strengthening  Manual Therapy  MT 1: Scar massage  with massage stick  to decrease adhesions and improve tensile glide  MT 2: Passive stretch of intrinsics to improve digit alignment  Modalities  Paraffin Bath: Paraffin bath x 10 min to  hand to increase blood flow, circulation and tissue elasticity prior to therex.    Time Entry(in minutes):  Paraffin Bath Time Entry: 10  Manual Therapy Time Entry: 15  Therapeutic Exercise Time Entry: 20    Assessment & Plan   Assessment: Pain continues to be  managble at all digits. Index digit remains in fair alignment, all other digits with significnat ulnar drift at MPs. Awaiting surgical follow up and consult for progression with next steps at 6 months per PA; Attempting shana taping of IF-MF to decrease ulnar drift.  Evaluation/Treatment Tolerance: Patient tolerated treatment well    Patient will continue to benefit from skilled outpatient occupational therapy to address the deficits listed in the problem list box on initial evaluation, provide pt/family education and to maximize pt's level of independence in the home and community environment.     Patient's spiritual, cultural, and educational needs considered and patient agreeable to plan of care and goals.           Plan: Cont OT for ROM, scar and edema management to improve functional alignment and use    Goals:   Active       LTG       4. Patient to be able to demonstrate IND and use of game controller by discharge (Progressing)       Start:  02/27/25    Expected End:  05/22/25               LTG's       1. Patient to be IND with HEP & modalities for pain management   (Met)       Start:  02/27/25    Expected End:  05/22/25    Resolved:  04/09/25         2. Increase MP and wrist ROM 3-10 degrees to increase functional hand use for ADLs/work/leisure activities  (Progressing)       Start:  02/27/25    Expected End:  05/22/25            3. Decrease edema .1-.5mm to increase joint mobility/ flexibility for  functional hand use  (Progressing)       Start:  02/27/25    Expected End:  05/22/25              Resolved       STG - orthotic only        Patient to be IND with orthotic use, wear and care precautions  (Met)       Start:  02/26/25    Expected End:  03/31/25    Resolved:  04/09/25             Meghan Peterson, OT, CHT

## 2025-04-22 ENCOUNTER — CLINICAL SUPPORT (OUTPATIENT)
Dept: REHABILITATION | Facility: HOSPITAL | Age: 50
End: 2025-04-22
Payer: OTHER GOVERNMENT

## 2025-04-22 DIAGNOSIS — M79.642 HAND PAIN, LEFT: Primary | ICD-10-CM

## 2025-04-22 PROCEDURE — 97110 THERAPEUTIC EXERCISES: CPT

## 2025-04-22 PROCEDURE — 97140 MANUAL THERAPY 1/> REGIONS: CPT

## 2025-04-22 PROCEDURE — 97018 PARAFFIN BATH THERAPY: CPT

## 2025-04-23 NOTE — PROGRESS NOTES
Outpatient Rehab    Occupational Therapy Visit    Patient Name: Jethro Uriarte III  MRN: [id number]  YOB: 1975  Encounter Date: 4/22/2025    Therapy Diagnosis:   Encounter Diagnosis   Name Primary?    Hand pain, left Yes     Physician: Mahesh Alcala PA-C    Physician Orders: Eval and Treat  Medical Diagnosis: Post-operative state  Contracture of joint of finger, unspecified laterality    Visit # / Visits Authorized: 11 / 14  Insurance Authorization Period: 1/21/2025 to 8/4/2025  Date of Evaluation:  2/21/2025   Plan of Care Certification:  2/21/2025 to 5/22/25      Time In: 1115   Time Out: 1200  Total Time: 45   Total Billable Time:  45    FOTO: not assessed   Intake Score:  %  Survey Score 1:  %  Survey Score 2:  %         Subjective   I still have a difficulty opening bottles, but video game playing is ok, just on same controller.  Pain reported as 2/10.      Objective           Treatment:  Therapeutic Exercise  TE 1: Wave, thumb Opposition to index and radial finger walking (with assistance as needed)  and digit extension with Place and hold as able to improve digit alignment x 10 reps each, 3x daily  TE 2: Yellow rubberband for individual digit abduction to strengthen interosseus and digit extension with assistance as needed; buddy tape index to MF at P1 to decrease ulnar drift with day use.  TE 4: 6# clothespin  with poms alternate for pinch strengthening  TE 5: Added putty tool small knob with coban and pink rainbow putty to simulate bottle opening x 2 min  Manual Therapy  MT 1: Scar massage  with mini massager  to decrease adhesions and improve tensile glide  MT 2: Passive stretch of intrinsics to improve digit alignment with focus on 3rd, 4th webspace  Modalities  Paraffin Bath: Paraffin bath x 10 min to  hand to increase blood flow, circulation and tissue elasticity prior to therex.    Time Entry(in minutes):  Paraffin Bath Time Entry: 10  Manual Therapy Time Entry: 15  Therapeutic Exercise Time  Entry: 20    Assessment & Plan   Assessment: Pain continues to be managble at all digits. Index digit remains in fair alignment, all other digits with significnat ulnar drift at MPs. Awaiting surgical follow up and consult for progression with next steps at 6 months per PA; Attempting shana taping of IF-MF to decrease ulnar drift.  Evaluation/Treatment Tolerance: Patient tolerated treatment well    Patient will continue to benefit from skilled outpatient occupational therapy to address the deficits listed in the problem list box on initial evaluation, provide pt/family education and to maximize pt's level of independence in the home and community environment.     Patient's spiritual, cultural, and educational needs considered and patient agreeable to plan of care and goals.           Plan: Cont OT for ROM, scar and edema management to improve functional alignment and use    Goals:   Active       LTG       4. Patient to be able to demonstrate IND and use of game controller by discharge (Progressing)       Start:  02/27/25    Expected End:  05/22/25               LTG's       1. Patient to be IND with HEP & modalities for pain management   (Met)       Start:  02/27/25    Expected End:  05/22/25    Resolved:  04/09/25         2. Increase MP and wrist ROM 3-10 degrees to increase functional hand use for ADLs/work/leisure activities  (Progressing)       Start:  02/27/25    Expected End:  05/22/25            3. Decrease edema .1-.5mm to increase joint mobility/ flexibility for  functional hand use  (Progressing)       Start:  02/27/25    Expected End:  05/22/25              Resolved       STG - orthotic only        Patient to be IND with orthotic use, wear and care precautions  (Met)       Start:  02/26/25    Expected End:  03/31/25    Resolved:  04/09/25             Meghan Peterson, OT, CHT

## 2025-04-29 ENCOUNTER — HOSPITAL ENCOUNTER (OUTPATIENT)
Dept: RADIOLOGY | Facility: OTHER | Age: 50
Discharge: HOME | End: 2025-04-29
Attending: SPECIALIST/TECHNOLOGIST
Payer: OTHER GOVERNMENT

## 2025-04-29 ENCOUNTER — OFFICE VISIT (OUTPATIENT)
Dept: ORTHOPEDICS | Facility: CLINIC | Age: 50
End: 2025-04-29
Payer: OTHER GOVERNMENT

## 2025-04-29 ENCOUNTER — CLINICAL SUPPORT (OUTPATIENT)
Dept: REHABILITATION | Facility: HOSPITAL | Age: 50
End: 2025-04-29
Payer: OTHER GOVERNMENT

## 2025-04-29 DIAGNOSIS — M79.642 HAND PAIN, LEFT: Primary | ICD-10-CM

## 2025-04-29 DIAGNOSIS — Z98.890 POST-OPERATIVE STATE: Primary | ICD-10-CM

## 2025-04-29 DIAGNOSIS — M79.642 LEFT HAND PAIN: ICD-10-CM

## 2025-04-29 DIAGNOSIS — M24.549 CONTRACTURE, UNSPECIFIED HAND: ICD-10-CM

## 2025-04-29 PROCEDURE — 97110 THERAPEUTIC EXERCISES: CPT

## 2025-04-29 PROCEDURE — 73130 X-RAY EXAM OF HAND: CPT | Mod: 26,LT,, | Performed by: RADIOLOGY

## 2025-04-29 PROCEDURE — 99213 OFFICE O/P EST LOW 20 MIN: CPT | Mod: PBBFAC,25 | Performed by: SPECIALIST/TECHNOLOGIST

## 2025-04-29 PROCEDURE — 99999 PR PBB SHADOW E&M-EST. PATIENT-LVL III: CPT | Mod: PBBFAC,,, | Performed by: SPECIALIST/TECHNOLOGIST

## 2025-04-29 PROCEDURE — 73130 X-RAY EXAM OF HAND: CPT | Mod: TC,FY,LT

## 2025-04-29 PROCEDURE — 97018 PARAFFIN BATH THERAPY: CPT

## 2025-04-29 PROCEDURE — 97140 MANUAL THERAPY 1/> REGIONS: CPT

## 2025-04-29 PROCEDURE — 99024 POSTOP FOLLOW-UP VISIT: CPT | Mod: ,,, | Performed by: SPECIALIST/TECHNOLOGIST

## 2025-04-29 NOTE — PROGRESS NOTES
Outpatient Rehab    Occupational Therapy Visit    Patient Name: Jethro Uriarte III  MRN: [id number]  YOB: 1975  Encounter Date: 4/29/2025    Therapy Diagnosis: No diagnosis found.  Physician: Mahesh Alcala PA-C    Physician Orders: Eval and Treat  Medical Diagnosis: Post-operative state  Contracture of joint of finger, unspecified laterality    Visit # / Visits Authorized: 12 / 14  Insurance Authorization Period: 1/21/2025 to 8/4/2025  Date of Evaluation:     02/21/2025     Plan of Care Certification: 2/27/2025 - 5/22/2025      Time In: 1055   Time Out: 1140  Total Time: 45   Total Billable Time: 40    FOTO:  Intake Score:  %  Survey Score 1:  %  Survey Score 2:  %         Subjective   Pt presents following PA visit. Planning to follow up with MD in  weeks to discuss MP arthroplasty.  Pain reported as 2/10.      Objective           Treatment:  Therapeutic Exercise  TE 1: Wave, thumb Opposition to index and radial finger walking (with assistance as needed)  and digit extension with Place and hold as able to improve digit alignment x 10 reps each, 3x daily  TE 2: Yellow rubberband for individual digit abduction to strengthen interosseus and digit extension with assistance as needed; buddy tape index to MF at P1 to decrease ulnar drift with day use.  TE 3: Digi flex (red) x 10 reps to simulate button controller on game console  TE 4: 8# (larger blue) clothespin  with poms alternate for pinch strengthening  TE 6: Rotate tennis ball radially  Modalities  Paraffin Bath: Paraffin bath x 10 min to  hand to increase blood flow, circulation and tissue elasticity prior to therex.    Time Entry(in minutes):  Paraffin Bath Time Entry: 10  Manual Therapy Time Entry: 15  Therapeutic Exercise Time Entry: 20    Assessment & Plan   Assessment: Patient continues to  maintain functional use of hand. Ulnar drift at MPs conitnues to cause poor alignment of digits. Focus on improved strength for preparation of potential follow  up opperation at js. Continue to progress with attention to strengthening within available range.  Evaluation/Treatment Tolerance: Patient tolerated treatment well    Patient will continue to benefit from skilled outpatient occupational therapy to address the deficits listed in the problem list box on initial evaluation, provide pt/family education and to maximize pt's level of independence in the home and community environment.     Patient's spiritual, cultural, and educational needs considered and patient agreeable to plan of care and goals.           Plan: Cont OT for ROM, scar and edema management to improve functional alignment and use    Goals:   Active       LTG       4. Patient to be able to demonstrate IND and use of game controller by discharge (Progressing)       Start:  02/27/25    Expected End:  05/22/25               LTG's       1. Patient to be IND with HEP & modalities for pain management   (Met)       Start:  02/27/25    Expected End:  05/22/25    Resolved:  04/09/25         2. Increase MP and wrist ROM 3-10 degrees to increase functional hand use for ADLs/work/leisure activities  (Progressing)       Start:  02/27/25    Expected End:  05/22/25            3. Decrease edema .1-.5mm to increase joint mobility/ flexibility for  functional hand use  (Progressing)       Start:  02/27/25    Expected End:  05/22/25              Resolved       STG - orthotic only        Patient to be IND with orthotic use, wear and care precautions  (Met)       Start:  02/26/25    Expected End:  03/31/25    Resolved:  04/09/25             Carmen Zamora, OT

## 2025-04-29 NOTE — PROGRESS NOTES
4/29/25  Patient reports 12 weeks status post left hand PIP joint fusions of digits 2 through 5.  He reports he is in no pain.  He has been attending therapy regularly.  He states that he is somewhat compliant again with wearing his anti ulnar drift brace at nighttime.  He does state that he forgets from occasion.    3/17/25  Patient reports 6 weeks status post left hand PIP joint fusions of digits 2 through 5.  He has been attending therapy regularly as well as performing his home exercise program.  He states that he has been somewhat compliant with wearing his anti ulnar drift brace at nighttime.  He denies any numbness or tingling or increase in pain.    2/19/25  Mr. Uriarte is here today for a post-operative visit.  He is 14 days status post L Hand PIP Fusions 2-5 by Dr. Lucas on 2/5/25. He reports that he is minimal pain.  He is beginning therapy on Friday. He denies fever, chills, and sweats since the time of the surgery.     Physical exam:    Vitals:    04/29/25 1031   PainSc: 0-No pain     Vital signs are stable, patient is afebrile.  Patient is well dressed and well groomed, no acute distress.  Alert and oriented to person, place, and time.  Incision is clean, dry and intact.  There is no erythema or exudate.  There is no sign of any infection. He is NVI.  Patient has extensor lag of about 20° of the MCP 2 through 5 as well as a ulnar drift of about 40° of the MCP joints.  Able to passively correct.    Assessment:   s/p  L Hand PIP Fusions 2-5         Plan:  Jethro was seen today for post-op evaluation.    Diagnoses and all orders for this visit:    Post-operative state    Contracture, unspecified hand      Continue with regular therapy   Advised patient on the importance of compliance wearing his anti ulnar drift brace at nighttime   Patient we will follow up with Dr. Rosa in 6 weeks discuss MCP arthroplasty of 2 through 5 with x-ray.

## 2025-05-06 ENCOUNTER — CLINICAL SUPPORT (OUTPATIENT)
Dept: REHABILITATION | Facility: HOSPITAL | Age: 50
End: 2025-05-06
Payer: OTHER GOVERNMENT

## 2025-05-06 DIAGNOSIS — M79.642 HAND PAIN, LEFT: Primary | ICD-10-CM

## 2025-05-06 PROCEDURE — 97140 MANUAL THERAPY 1/> REGIONS: CPT

## 2025-05-06 PROCEDURE — 97110 THERAPEUTIC EXERCISES: CPT

## 2025-05-06 PROCEDURE — 97018 PARAFFIN BATH THERAPY: CPT

## 2025-05-07 NOTE — PROGRESS NOTES
Outpatient Rehab    Occupational Therapy Progress Note    Patient Name: Jethro Uriarte III  MRN: [id number]  YOB: 1975  Encounter Date: 5/6/2025    Therapy Diagnosis:   Encounter Diagnosis   Name Primary?    Hand pain, left Yes     Physician: Mahesh Alcala PA-C    Physician Orders: Eval and Treat  Medical Diagnosis: Post-operative state  Contracture of joint of finger, unspecified laterality    Visit # / Visits Authorized: 13 / 14  Insurance Authorization Period: 1/21/2025 to 8/4/2025  Date of Evaluation:  2/21/2025   Plan of Care Certification:  2/21/2025 to 5/22/25      Time In: 1115   Time Out: 1200  Total Time (in minutes): 45   Total Billable Time (in minutes):  45    FOTO: not assessed   Intake Score:  %  Survey Score 2:  %  Survey Score 3:  %         Subjective   Still using game controller; reports he wears splint sometimes during day and mostly at night.  Pain reported as 2/10.      Objective      Wrist/Hand Swelling   Right   Wrist Circumference     Figure of Eight     MCP Circumference 17.4 cm (-0.4)   Volumetry        Digit 2 - Index Finger Active Range of Motion  Left Index Finger   Extension (deg) Flexion (deg) Pain   MCP -32 70     PIP -32 32     DIP 20 20     GONZALEZ: 78 (+48)    Digit 3 - Middle Finger Active Range of Motion  Left Middle Finger   Extension (deg) Flexion (deg) Pain   MCP -54 70     PIP -30 30     DIP 0 25     GONZALEZ: 41(-6)    Digit 4 - Ring Finger Active Range of Motion  Left Ring Finger   Extension (deg) Flexion (deg) Pain   MCP -55 80     PIP -35 35     DIP 0 0     GONZALEZ: 25(+4)    Digit 5 - Little Finger Active Range of Motion  Left Little Finger   Extension (deg) Flexion (deg) Pain   MCP -55 80     PIP -35 35     DIP -38 38     GONZALEZ: 25 (=)      Patient presents with 28* ulnar drift of MF, RF, SF; attempting to improve with shana taping IF to MF; may attempt hand based anti-ulnar drift orthosis to improve alignment prior to MP arthroplasty.        Treatment:  Therapeutic  Exercise  TE 1: Wave, thumb Opposition to index and radial finger walking (with assistance as needed)  and digit extension with Place and hold as able to improve digit alignment x 10 reps each, 3x daily  TE 2: Yellow rubberband for individual digit abduction to strengthen interosseus and digit extension with assistance as needed; buddy tape index to MF at P1 to decrease ulnar drift with day use.  TE 3: Digi flex (red) x 10 reps to simulate button controller on game console  TE 4: 8# (larger blue) clothespin  with poms alternate for pinch strengthening  TE 5: Added putty tool small knob with coban and pink rainbow putty to simulate bottle opening x 2 min  TE 6: RE-assess ROM, edema this date  TE 7: Buddy tape IF to MF at P1 to encourage radial pull of ring finger to improve digit alignment  Manual Therapy  MT 1: Scar massage  with mini massager  to decrease adhesions and improve tensile glide  MT 2: Passive stretch of intrinsics to improve digit alignment with focus on 3rd, 4th webspace  Modalities  Paraffin Bath: Paraffin bath x 10 min to  hand to increase blood flow, circulation and tissue elasticity prior to therex.    Time Entry(in minutes):  Paraffin Bath Time Entry: 10  Manual Therapy Time Entry: 15  Therapeutic Exercise Time Entry: 20    Assessment & Plan   Assessment: Patient continues to  maintain functional use of hand. Ulnar drift at MPs conitnues to cause poor alignment of digits. Focus on improved strength for preparation of potential follow up opperation at MPjs. Continue to progress with attention to strengthening within available range. May fabricate anti ulnar deviation orthotic for day use to improve alignment prior to MP arthroplasty     Evaluation/Treatment Tolerance: Patient tolerated treatment well    Patient will continue to benefit from skilled outpatient occupational therapy to address the deficits listed in the problem list box on initial evaluation, provide pt/family education and to  maximize pt's level of independence in the home and community environment.     Patient's spiritual, cultural, and educational needs considered and patient agreeable to plan of care and goals.           Plan: Cont OT for ROM, scar and edema management to improve functional alignment and use    Goals:   Active       LTG       4. Patient to be able to demonstrate IND and use of game controller by discharge (Progressing)       Start:  02/27/25    Expected End:  05/22/25               LTG's       1. Patient to be IND with HEP & modalities for pain management   (Met)       Start:  02/27/25    Expected End:  05/22/25    Resolved:  04/09/25         2. Increase MP and wrist ROM 3-10 degrees to increase functional hand use for ADLs/work/leisure activities  (Progressing)       Start:  02/27/25    Expected End:  05/22/25            3. Decrease edema .1-.5mm to increase joint mobility/ flexibility for  functional hand use  (Progressing)       Start:  02/27/25    Expected End:  05/22/25              Resolved       STG - orthotic only        Patient to be IND with orthotic use, wear and care precautions  (Met)       Start:  02/26/25    Expected End:  03/31/25    Resolved:  04/09/25             Meghan Peterson, OT, CHT

## 2025-05-13 ENCOUNTER — CLINICAL SUPPORT (OUTPATIENT)
Dept: REHABILITATION | Facility: HOSPITAL | Age: 50
End: 2025-05-13
Payer: OTHER GOVERNMENT

## 2025-05-13 DIAGNOSIS — M79.642 HAND PAIN, LEFT: Primary | ICD-10-CM

## 2025-05-14 NOTE — PROGRESS NOTES
Outpatient Rehab    Occupational Therapy Progress Note    Patient Name: Jethro Uriarte III  MRN: [id number]  YOB: 1975  Encounter Date: 5/13/2025    Therapy Diagnosis:   Encounter Diagnosis   Name Primary?    Hand pain, left Yes     Physician: Mahesh Alcala PA-C    Physician Orders: Eval and Treat  Medical Diagnosis: Post-operative state  Contracture of joint of finger, unspecified laterality    Visit # / Visits Authorized: 14 / 14  Insurance Authorization Period: 1/21/2025 to 8/4/2025  Date of Evaluation: 2/21/2025  Plan of Care Certification: 2/26/2025 to 3/31/2025      Time In:     Time Out:    Total Time (in minutes):     Total Billable Time (in minutes):      FOTO:  Intake Score:  %  Survey Score 2:  %  Survey Score 3:  %    Precautions:       Subjective   Pt notes he has improved ROM; able to use his game controllers efficiently..  Pain reported as 2/10.      Objective       Digit 2 - Index Finger Active Range of Motion  Left Index Finger   Extension (deg) Flexion (deg) Pain   MCP -32 70     PIP -32 32     DIP 20 20     GONZALEZ: 78    Digit 3 - Middle Finger Active Range of Motion  Left Middle Finger   Extension (deg) Flexion (deg) Pain   MCP -54 70     PIP -30 30     DIP 0 25     GONZALEZ: 41    Digit 4 - Ring Finger Active Range of Motion  Left Ring Finger   Extension (deg) Flexion (deg) Pain   MCP -55 80     PIP -35 35     DIP 0 0     GONZALEZ: 25    Digit 5 - Little Finger Active Range of Motion  Left Little Finger   Extension (deg) Flexion (deg) Pain   MCP -55 80     PIP -35 35     DIP -38 38     GONZALEZ: 25                        Treatment:  Therapeutic Exercise  TE 10: Fabricated an anti- ulnar drift orthosis to prevent exadgerated MP drift  Manual Therapy  MT 1: Scar massage  with mini massager  to decrease adhesions and improve tensile glide  Modalities  Paraffin Bath: Paraffin bath x 10 min to  hand to increase blood flow, circulation and tissue elasticity prior to therex.    Time Entry(in minutes):        Assessment & Plan   Assessment: Pt demo's functional use of L hand. Orthosis fabricated at thsi time to limit excessive ulnar drift. Orthosis provides improved support in proper alignment. Plan to continue to strengthen within available range.  Evaluation/Treatment Tolerance: Patient tolerated treatment well    Patient will continue to benefit from skilled outpatient occupational therapy to address the deficits listed in the problem list box on initial evaluation, provide pt/family education and to maximize pt's level of independence in the home and community environment.     Patient's spiritual, cultural, and educational needs considered and patient agreeable to plan of care and goals.           Plan: Cont OT for ROM, scar and edema management to improve functional alignment and use    Goals:   Active       LTG       4. Patient to be able to demonstrate IND and use of game controller by discharge (Progressing)       Start:  02/27/25    Expected End:  05/22/25               LTG's       1. Patient to be IND with HEP & modalities for pain management   (Met)       Start:  02/27/25    Expected End:  05/22/25    Resolved:  04/09/25         2. Increase MP and wrist ROM 3-10 degrees to increase functional hand use for ADLs/work/leisure activities  (Progressing)       Start:  02/27/25    Expected End:  05/22/25            3. Decrease edema .1-.5mm to increase joint mobility/ flexibility for  functional hand use  (Met)       Start:  02/27/25    Expected End:  05/22/25    Resolved:  05/07/25           Resolved       STG - orthotic only        Patient to be IND with orthotic use, wear and care precautions  (Met)       Start:  02/26/25    Expected End:  03/31/25    Resolved:  04/09/25             Carmen Zamora OT

## 2025-05-20 ENCOUNTER — CLINICAL SUPPORT (OUTPATIENT)
Dept: REHABILITATION | Facility: HOSPITAL | Age: 50
End: 2025-05-20
Payer: OTHER GOVERNMENT

## 2025-05-20 DIAGNOSIS — M79.642 HAND PAIN, LEFT: Primary | ICD-10-CM

## 2025-05-20 PROCEDURE — 97763 ORTHC/PROSTC MGMT SBSQ ENC: CPT

## 2025-05-20 PROCEDURE — 97018 PARAFFIN BATH THERAPY: CPT

## 2025-05-27 NOTE — PROGRESS NOTES
Outpatient Rehab    Occupational Therapy Visit    Patient Name: Jethro Uriarte III  MRN: [id number]  YOB: 1975  Encounter Date: 5/20/2025    Therapy Diagnosis: No diagnosis found.  Physician: Mahesh Alcala PA-C    Physician Orders: Eval and Treat  Medical Diagnosis: Post-operative state  Contracture of joint of finger, unspecified laterality    Visit # / Visits Authorized: 15 / 29  Insurance Authorization Period: 1/21/2025 to 9/19/2025  Date of Evaluation: 2/21/2025  Plan of Care Certification: 2/26/2025 to 3/31/2025      Time In: 1115   Time Out: 1200  Total Time (in minutes): 45   Total Billable Time (in minutes): 45    FOTO:  Intake Score:  %  Survey Score 2:  %  Survey Score 3:  %    Precautions:       Subjective   Pt demo's good tolerance to anti ulnar drift orthosis at this time for daily use..         Objective            Treatment:  Therapeutic Exercise  TE 10: Adjusted anti-ulnar drift orthosis to decrease risk of skin irritation and breakdown at this time.  Manual Therapy  MT 2: Passive stretch of intrinsics to improve digit alignment with focus on 3rd, 4th webspace  Modalities  Paraffin Bath: x10 minutes - For improved circulation and increased tissue flexibility prior to exercises    Time Entry(in minutes):  Paraffin Bath Time Entry: 10  Manual Therapy Time Entry: 15    Assessment & Plan   Assessment: Orthosis adjusted at this time to limit ulnar drift of MPjs. He demo's functional limbrical grasp at this time. Plan to await further aproval from insureance to continue. Pt to follow up with MD in two week to discuss potential operation on MPjs.  Evaluation/Treatment Tolerance: Patient tolerated treatment well    The patient will continue to benefit from skilled outpatient occupational therapy in order to address the deficits listed in the problem list on the initial evaluation, provide patient and family education, and maximize the patients level of independence in the home and community  environments.     The patient's spiritual, cultural, and educational needs were considered, and the patient is agreeable to the plan of care and goals.           Plan: Hold therapy at this time as awaiting insurance approval.    Goals:   Active       LTG       4. Patient to be able to demonstrate IND and use of game controller by discharge (Progressing)       Start:  02/27/25    Expected End:  05/22/25               LTG's       1. Patient to be IND with HEP & modalities for pain management   (Met)       Start:  02/27/25    Expected End:  05/22/25    Resolved:  04/09/25         2. Increase MP and wrist ROM 3-10 degrees to increase functional hand use for ADLs/work/leisure activities  (Progressing)       Start:  02/27/25    Expected End:  05/22/25            3. Decrease edema .1-.5mm to increase joint mobility/ flexibility for  functional hand use  (Met)       Start:  02/27/25    Expected End:  05/22/25    Resolved:  05/07/25           Resolved       STG - orthotic only        Patient to be IND with orthotic use, wear and care precautions  (Met)       Start:  02/26/25    Expected End:  03/31/25    Resolved:  04/09/25             Carmen Zamora OT

## 2025-06-04 ENCOUNTER — TELEPHONE (OUTPATIENT)
Dept: ORTHOPEDICS | Facility: CLINIC | Age: 50
End: 2025-06-04
Payer: OTHER GOVERNMENT

## 2025-06-04 DIAGNOSIS — M79.642 LEFT HAND PAIN: Primary | ICD-10-CM

## 2025-06-10 ENCOUNTER — OFFICE VISIT (OUTPATIENT)
Dept: ORTHOPEDICS | Facility: CLINIC | Age: 50
End: 2025-06-10
Payer: OTHER GOVERNMENT

## 2025-06-10 ENCOUNTER — HOSPITAL ENCOUNTER (OUTPATIENT)
Dept: RADIOLOGY | Facility: OTHER | Age: 50
Discharge: HOME | End: 2025-06-10
Attending: ORTHOPAEDIC SURGERY
Payer: OTHER GOVERNMENT

## 2025-06-10 VITALS — WEIGHT: 130.06 LBS | HEIGHT: 68 IN | BODY MASS INDEX: 19.71 KG/M2

## 2025-06-10 DIAGNOSIS — M24.549 CONTRACTURE OF JOINT OF FINGER, UNSPECIFIED LATERALITY: Primary | ICD-10-CM

## 2025-06-10 DIAGNOSIS — M79.642 LEFT HAND PAIN: ICD-10-CM

## 2025-06-10 DIAGNOSIS — M19.049 ARTHRITIS OF FINGER: ICD-10-CM

## 2025-06-10 DIAGNOSIS — M24.549 CONTRACTURE, UNSPECIFIED HAND: Primary | ICD-10-CM

## 2025-06-10 DIAGNOSIS — M20.002 FINGER DEFORMITY, LEFT: ICD-10-CM

## 2025-06-10 PROCEDURE — 73130 X-RAY EXAM OF HAND: CPT | Mod: 26,LT,, | Performed by: RADIOLOGY

## 2025-06-10 PROCEDURE — 99213 OFFICE O/P EST LOW 20 MIN: CPT | Mod: PBBFAC,25 | Performed by: ORTHOPAEDIC SURGERY

## 2025-06-10 PROCEDURE — 99999 PR PBB SHADOW E&M-EST. PATIENT-LVL III: CPT | Mod: PBBFAC,,, | Performed by: ORTHOPAEDIC SURGERY

## 2025-06-10 PROCEDURE — 73130 X-RAY EXAM OF HAND: CPT | Mod: TC,FY,LT

## 2025-06-10 PROCEDURE — 99215 OFFICE O/P EST HI 40 MIN: CPT | Mod: S$PBB,,, | Performed by: ORTHOPAEDIC SURGERY

## 2025-06-10 NOTE — PROGRESS NOTES
"6/10/25 Patient is here today to discus 2-5 MCP arthoplasty on the left hand. He had left hand PIP joint fusions on 2/5/25. He has completed therapy. He continues to wear the anti ulna drift brace.   4/29/25  Patient reports 12 weeks status post left hand PIP joint fusions of digits 2 through 5.  He reports he is in no pain.  He has been attending therapy regularly.  He states that he is somewhat compliant again with wearing his anti ulnar drift brace at nighttime.  He does state that he forgets from occasion.    3/17/25  Patient reports 6 weeks status post left hand PIP joint fusions of digits 2 through 5.  He has been attending therapy regularly as well as performing his home exercise program.  He states that he has been somewhat compliant with wearing his anti ulnar drift brace at nighttime.  He denies any numbness or tingling or increase in pain.    2/19/25  Mr. Uriarte is here today for a post-operative visit.  He is 14 days status post L Hand PIP Fusions 2-5 by Dr. Lucas on 2/5/25. He reports that he is minimal pain.  He is beginning therapy on Friday. He denies fever, chills, and sweats since the time of the surgery.     Physical exam:    Vitals:    06/10/25 1102   Weight: 59 kg (130 lb 1.1 oz)   Height: 5' 8" (1.727 m)   PainSc: 0-No pain     Vital signs are stable, patient is afebrile.  Patient is well dressed and well groomed, no acute distress.  Alert and oriented to person, place, and time.  Incision is clean, dry and intact.  There is no erythema or exudate.  There is no sign of any infection. He is NVI.  Patient has extensor lag of about 20° of the MCP 2 through 5 as well as a ulnar drift of about 40° of the MCP joints.  Able to passively correct.    Assessment:   s/p  L Hand PIP Fusions 2-5         Plan:  There are no diagnoses linked to this encounter.    Continue with regular therapy   Advised patient on the importance of compliance wearing his anti ulnar drift brace at nighttime   Consents were " signed today for a left MCP arthroplasty of 2 through 5

## 2025-06-10 NOTE — PROGRESS NOTES
Patient ID: Jethro Uriarte III is a 49 y.o. male.    Chief Complaint: Follow-up of the Right Hand    History of Present Illness    CHIEF COMPLAINT:  Left hand 2nd through 5th MCP arthroplasty    HPI:  Mr. Uriarte presents for evaluation of the left hand, scheduled for a 2-3-5 MCP arthroplasty. His hand feels slightly stiff. He has previously undergone a similar procedure on the right hand. The caregiver reports improved maneuverability of his fingers and easier nail cutting since the previous surgery. The caregiver notes that it has been easier to maneuver his fingers and cut his nails since the surgery, and as long as they can perform nail care without causing facial scratches, the outcome is satisfactory.    He denies use of steroids, smoking, or nicotine use. No history of heart disease, clotting disorder, or diabetes is reported.    PREVIOUS TREATMENTS:  Mr. Uriarte previously underwent right hand MCP arthroplasty, which improved finger maneuverability and nail care.    SURGICAL HISTORY:  Mr. Uriarte has a history of right hand MCP arthroplasty. He also had fused joints in his left hand, and the second through fifth MCP joints fused in his right hand.    SOCIAL HISTORY:  Mr. rUiarte denies smoking and nicotine use.      ROS:  General: denies fever, denies chills, denies fatigue, denies weight gain, denies weight loss  Eyes: denies vision changes, denies redness, denies discharge  ENT: denies ear pain, denies nasal congestion, denies sore throat  Cardiovascular: denies chest pain, denies palpitations, denies lower extremity edema  Respiratory: denies cough, denies shortness of breath  Gastrointestinal: denies abdominal pain, denies nausea, denies vomiting, denies diarrhea, denies constipation, denies blood in stool  Genitourinary: denies dysuria, denies hematuria, denies frequency  Musculoskeletal: denies joint pain, denies muscle pain, reports joint stiffness  Skin: denies rash, denies lesion  Neurological: denies headache,  denies dizziness, denies numbness, denies tingling  Psychiatric: denies anxiety, denies depression, denies sleep difficulty         Physical Exam    MSK: Hand/Wrist - Left: Boutonniere deformity. Limited range of motion in MCP joints. Stiffness in fused joints.         Assessment & Plan     Left hand 2-3-5 MCP arthroplasty: replacing 2nd through 5th MCP joints.   Regional anesthesia for upper extremity numbness.   Implants typically last about 10 years before requiring revision.   Potential need for ligament balancing and soft tissue adjustment to stabilize joints.   Possible tendon release for finger with boutonniere deformity.              No follow-ups on file.    This note was generated with the assistance of ambient listening technology. Verbal consent was obtained by the patient and accompanying visitor(s) for the recording of patient appointment to facilitate this note. I attest to having reviewed and edited the generated note for accuracy, though some syntax or spelling errors may persist. Please contact the author of this note for any clarification.

## 2025-07-10 ENCOUNTER — ANESTHESIA EVENT (OUTPATIENT)
Dept: SURGERY | Facility: OTHER | Age: 50
End: 2025-07-10
Payer: OTHER GOVERNMENT

## 2025-07-11 ENCOUNTER — HOSPITAL ENCOUNTER (OUTPATIENT)
Dept: PREADMISSION TESTING | Facility: OTHER | Age: 50
Discharge: HOME | End: 2025-07-11
Attending: ORTHOPAEDIC SURGERY
Payer: OTHER GOVERNMENT

## 2025-07-11 VITALS
DIASTOLIC BLOOD PRESSURE: 68 MMHG | OXYGEN SATURATION: 95 % | RESPIRATION RATE: 16 BRPM | HEART RATE: 106 BPM | SYSTOLIC BLOOD PRESSURE: 108 MMHG | HEIGHT: 68 IN | BODY MASS INDEX: 21.52 KG/M2 | TEMPERATURE: 98 F | WEIGHT: 142 LBS

## 2025-07-11 RX ORDER — LIDOCAINE HYDROCHLORIDE 10 MG/ML
0.5 INJECTION, SOLUTION EPIDURAL; INFILTRATION; INTRACAUDAL; PERINEURAL ONCE
Status: CANCELLED | OUTPATIENT
Start: 2025-07-11 | End: 2025-07-11

## 2025-07-11 RX ORDER — ALBUTEROL SULFATE 2.5 MG/.5ML
2.5 SOLUTION RESPIRATORY (INHALATION)
Status: CANCELLED | OUTPATIENT
Start: 2025-07-11 | End: 2025-07-11

## 2025-07-11 RX ORDER — SODIUM CHLORIDE, SODIUM LACTATE, POTASSIUM CHLORIDE, CALCIUM CHLORIDE 600; 310; 30; 20 MG/100ML; MG/100ML; MG/100ML; MG/100ML
INJECTION, SOLUTION INTRAVENOUS CONTINUOUS
Status: CANCELLED | OUTPATIENT
Start: 2025-07-11

## 2025-07-11 NOTE — DISCHARGE INSTRUCTIONS
Information to Prepare you for your Surgery    PRE-ADMIT TESTING   [address]  [place] [facility]  [address]   [phone]  - PAYAM Robbins    Your surgery has been scheduled at [facility]. We are pleased to have the opportunity to serve you. For Further Information please call [phone].    On the day of surgery please report to Registration on the 1st floor of the [facility].    CONTACT YOUR PHYSICIAN'S OFFICE THE DAY PRIOR TO YOUR SURGERY TO OBTAIN YOUR ARRIVAL TIME.     The evening before surgery do not eat anything after 9 p.m. ( this includes hard candy, chewing gum and mints).  You may only have GATORADE /  POWERADE AND WATER  from 9 p.m. until you leave your home.   DRINK AT LEAST 12 OUNCES THE MORNING OF SURGERY    DO NOT DRINK ANY LIQUIDS ON THE WAY TO THE HOSPITAL.      Why does your anesthesiologist allow you to drink Gatorade/Powerade before surgery?    Gatorade/Powerade helps to increase your comfort before surgery and to decrease your nausea after surgery.  The carbohydrates in the Gatorade/Powerade help reduce your body's stress response to surgery.  If you are diabetic, drink only water prior to surgery.       Outpatient Surgery- May allow 2 adults (18 and older)/ Support Persons (1 being the designated ) for all surgical/procedural patients. A breastfeeding mother will be allowed her infant and 2 adult Support Persons. No one under the age of 18 will be allowed in the building.          Angiogram Patients: Take medications as instructed by your physician, including aspirin.     Surgery Patients:  If you take ASPIRIN - Your PHYSICIAN/SURGEON will need to inform you IF/OR when you need to stop taking aspirin prior to your surgery.     Starting the week prior to surgery, do not take any medications containing IBUPROFEN or NSAIDS (Advil, Aleve, BC, Celebrex, Goody's, Ketorolac, Meloxicam, Mobic, Motrin, Naproxen, Toradol, etc).  If you are  not sure if you should take a medication please call your surgeon's office.  You may take Tylenol.    Do Not Wear any make-up (especially eye make-up) to surgery. Please remove any false eyelashes or eyelash extensions. If you arrive the day of surgery with makeup/eyelashes on you will be required to remove prior to surgery. (There is a risk of corneal abrasions if eye makeup/eyelash extensions are not removed)    Leave all valuables at home.   Do Not wear any jewelry or watches, including any metal in body piercings. Jewelry must be removed prior to coming to the hospital.  There is a possibility that rings that are unable to be removed may be cut off if they are on the surgical extremity.    Please remove all hair extensions, wigs, clips and any other metal accessories/ ornaments from your hair.  These items may pose a flammable/fire risk in Surgery and must be removed.    Do not shave your surgical area at least 5 days prior to your surgery. The surgical prep will be performed at the hospital according to Infection Control regulations.    Contact Lens must be removed before surgery. Either do not wear the contact lens or bring a case and solution for storage.  Please bring a container for eyeglasses or dentures as required.  Bring any paperwork your physician has provided, such as consent forms,  history and physicals, doctor's orders, etc.   Bring comfortable clothes that are loose fitting to wear upon discharge. Take into consideration the type of surgery being performed.  Maintain your diet as advised per your physician the day prior to surgery.    Adequate rest the night before surgery is advised.   Park in the Parking lot behind the hospital or in the [facility] across the street from the parking lot. Parking is complimentary.  If you will be discharged the same day as your procedure, please arrange for a responsible adult to drive you home or to accompany you if traveling by taxi.   YOU WILL  NOT BE PERMITTED TO DRIVE OR TO LEAVE THE HOSPITAL ALONE AFTER SURGERY.   If you are being discharged the same day, it is strongly recommended that you arrange for someone to remain with you for the first 24 hrs following your surgery.    The Surgeon will speak to your family/visitor after your surgery regarding the outcome of your surgery and post op care.  The Surgeon may speak to you after your surgery, but there is a possibility you may not remember the details.  Please check with your family members regarding the conversation with the Surgeon.         Bathing Instructions with Hibiclens:    Shower the evening before and morning of your procedure with Chlorhexidine (Hibiclens)  do not use Chlorhexidine on your face or genitals. Do not get in your eyes.  Wash your face with water and your regular face wash/soap  Use your regular shampoo  Apply Chlorhexidine (Hibiclens) directly on your skin or on a wet washcloth and wash gently. When showering: Move away from the shower stream when applying Chlorhexidine (Hibiclens) to avoid rinsing off too soon.  Rinse thoroughly with warm water  Do not dilute Chlorhexidine (Hibiclens)   Dry off as usual, do not use any deodorant, powder, body lotions, perfume, after shave or cologne.     We strongly recommend whoever is bringing you home be present for discharge instructions.  This will ensure a thorough understanding for your post op home care.    If the patient has fever, cough, or signs/symptoms of Flu or Covid please do not come in for your surgery.  First, contact the pre op department at [phone] (unit opens at 5AM) and then contact your surgeon and your primary care physician for further instructions.      If applicable, please bring any blood pressure and/or diabetes medications with you the day of surgery.  If on home oxygen, even at night, please bring your portable oxygen tank with you the day of surgery in case it is needed for your discharge.       Thanks,  PAYAM Robbins

## 2025-07-11 NOTE — ANESTHESIA PREPROCEDURE EVALUATION
07/10/2025  Jethro Uriarte III is a 49 y.o., male.      Pre-op Assessment    I have reviewed the Patient Summary Reports.     I have reviewed the Nursing Notes. I have reviewed the NPO Status.   I have reviewed the Medications.     Review of Systems  Anesthesia Hx:    Hx Emergence Delirium (per chart, but pt denies)           Denies Family Hx of Anesthesia complications.   Personal Hx of Anesthesia complications   Difficult Intubation                  Hematology/Oncology:  Hematology Normal   Oncology Normal                                   EENT/Dental:  chronic allergic rhinitis           Cardiovascular:  Cardiovascular Normal                   ECHO 2020  EF 55%    Negative cath 2020                           Pulmonary:         Bronchiectasis (uses albuterol bid)               Renal/:  Renal/ Normal                 Hepatic/GI:  Hepatic/GI Normal                    Musculoskeletal:     Polymyositis  Systemic sclerosis            Neurological:    Denies CVA.    Seizures    Wheelchair bound    Medication induced seizure - 4 years ago (no longer taking)                            Endocrine:     Chronic steroids (been off for several years)        Psych:  Psychiatric Normal                  Physical Exam  General: Well nourished and Cooperative    Airway:  Mallampati: IV   Mouth Opening: < 3 cm  TM Distance: Normal  Neck ROM: Extension Decreased    Dental:  Teeth absent      Anesthesia Plan  Type of Anesthesia, risks & benefits discussed:    Anesthesia Type: Regional  Intra-op Monitoring Plan: Standard ASA Monitors  Post Op Pain Control Plan: multimodal analgesia and peripheral nerve block  Induction:  IV  Airway Plan: Video and Direct  Informed Consent: Informed consent signed with the Patient and all parties understand the risks and agree with anesthesia plan.  All questions answered.   ASA Score: 3  Day of  Surgery Review of History & Physical: H&P Update referred to the surgeon/provider.  Anesthesia Plan Notes: CMP 04/2025 and CBC 03/2025 in note in care everywhere  Has had 3 surgeries at [facility] since 06/2024, last one was in 02/2025, all with MAC, 2 with blocks, 1 without. Hx of difficult intubation    Ready For Surgery From Anesthesia Perspective.     .

## 2025-07-15 ENCOUNTER — TELEPHONE (OUTPATIENT)
Dept: ORTHOPEDICS | Facility: CLINIC | Age: 50
End: 2025-07-15
Payer: OTHER GOVERNMENT

## 2025-07-15 RX ORDER — ACETAMINOPHEN 500 MG
1000 TABLET ORAL 2 TIMES DAILY PRN
Qty: 50 TABLET | Refills: 0 | Status: SHIPPED | OUTPATIENT
Start: 2025-07-15

## 2025-07-15 RX ORDER — OXYCODONE AND ACETAMINOPHEN 5; 325 MG/1; MG/1
1 TABLET ORAL
Qty: 10 TABLET | Refills: 0 | Status: SHIPPED | OUTPATIENT
Start: 2025-07-15

## 2025-07-15 RX ORDER — IBUPROFEN 600 MG/1
600 TABLET, FILM COATED ORAL 3 TIMES DAILY PRN
Qty: 45 TABLET | Refills: 0 | Status: SHIPPED | OUTPATIENT
Start: 2025-07-15

## 2025-07-15 RX ORDER — ONDANSETRON 4 MG/1
4 TABLET, FILM COATED ORAL EVERY 8 HOURS PRN
Qty: 10 TABLET | Refills: 0 | Status: SHIPPED | OUTPATIENT
Start: 2025-07-15

## 2025-07-16 ENCOUNTER — HOSPITAL ENCOUNTER (OUTPATIENT)
Facility: OTHER | Age: 50
Discharge: HOME | End: 2025-07-16
Attending: ORTHOPAEDIC SURGERY | Admitting: ORTHOPAEDIC SURGERY
Payer: OTHER GOVERNMENT

## 2025-07-16 ENCOUNTER — ANESTHESIA (OUTPATIENT)
Dept: SURGERY | Facility: OTHER | Age: 50
End: 2025-07-16
Payer: OTHER GOVERNMENT

## 2025-07-16 VITALS
BODY MASS INDEX: 21.52 KG/M2 | WEIGHT: 142 LBS | TEMPERATURE: 98 F | OXYGEN SATURATION: 96 % | RESPIRATION RATE: 16 BRPM | SYSTOLIC BLOOD PRESSURE: 101 MMHG | HEIGHT: 68 IN | HEART RATE: 77 BPM | DIASTOLIC BLOOD PRESSURE: 69 MMHG

## 2025-07-16 DIAGNOSIS — M19.90 ARTHRITIS: ICD-10-CM

## 2025-07-16 DIAGNOSIS — M06.9 RHEUMATOID ARTHRITIS INVOLVING LEFT HAND, UNSPECIFIED WHETHER RHEUMATOID FACTOR PRESENT: ICD-10-CM

## 2025-07-16 DIAGNOSIS — M24.542 CONTRACTURE OF LEFT HAND: Primary | ICD-10-CM

## 2025-07-16 PROCEDURE — 94640 AIRWAY INHALATION TREATMENT: CPT

## 2025-07-16 PROCEDURE — 37000008 HC ANESTHESIA 1ST 15 MINUTES: Performed by: ORTHOPAEDIC SURGERY

## 2025-07-16 PROCEDURE — 27201423 OPTIME MED/SURG SUP & DEVICES STERILE SUPPLY: Performed by: ORTHOPAEDIC SURGERY

## 2025-07-16 PROCEDURE — 26426 REPAIR FINGER/HAND TENDON: CPT | Mod: 51,LT,, | Performed by: ORTHOPAEDIC SURGERY

## 2025-07-16 PROCEDURE — 37000009 HC ANESTHESIA EA ADD 15 MINS: Performed by: ORTHOPAEDIC SURGERY

## 2025-07-16 PROCEDURE — 63600175 PHARM REV CODE 636 W HCPCS: Performed by: ANESTHESIOLOGY

## 2025-07-16 PROCEDURE — 63600175 PHARM REV CODE 636 W HCPCS: Performed by: NURSE ANESTHETIST, CERTIFIED REGISTERED

## 2025-07-16 PROCEDURE — 36000708 HC OR TIME LEV III 1ST 15 MIN: Performed by: ORTHOPAEDIC SURGERY

## 2025-07-16 PROCEDURE — 71000015 HC POSTOP RECOV 1ST HR: Performed by: ORTHOPAEDIC SURGERY

## 2025-07-16 PROCEDURE — 63600175 PHARM REV CODE 636 W HCPCS

## 2025-07-16 PROCEDURE — 25000242 PHARM REV CODE 250 ALT 637 W/ HCPCS

## 2025-07-16 PROCEDURE — L8630 METACARPOPHALANGEAL IMPLANT: HCPCS | Mod: MUE | Performed by: ORTHOPAEDIC SURGERY

## 2025-07-16 PROCEDURE — 76942 ECHO GUIDE FOR BIOPSY: CPT | Performed by: ANESTHESIOLOGY

## 2025-07-16 PROCEDURE — 71000016 HC POSTOP RECOV ADDL HR: Performed by: ORTHOPAEDIC SURGERY

## 2025-07-16 PROCEDURE — 25000003 PHARM REV CODE 250: Performed by: NURSE ANESTHETIST, CERTIFIED REGISTERED

## 2025-07-16 PROCEDURE — 36000709 HC OR TIME LEV III EA ADD 15 MIN: Performed by: ORTHOPAEDIC SURGERY

## 2025-07-16 PROCEDURE — 26535 REVISE FINGER JOINT: CPT | Mod: LT,,, | Performed by: ORTHOPAEDIC SURGERY

## 2025-07-16 DEVICE — SILICONE II MCP IMPLANT SIZE 10: Type: IMPLANTABLE DEVICE | Site: FINGER | Status: FUNCTIONAL

## 2025-07-16 DEVICE — SILICONE II MCP IMPLANT SIZE 20: Type: IMPLANTABLE DEVICE | Site: FINGER | Status: FUNCTIONAL

## 2025-07-16 RX ORDER — ALBUTEROL SULFATE 2.5 MG/.5ML
2.5 SOLUTION RESPIRATORY (INHALATION)
Status: COMPLETED | OUTPATIENT
Start: 2025-07-16 | End: 2025-07-16

## 2025-07-16 RX ORDER — OXYCODONE HYDROCHLORIDE 5 MG/1
5 TABLET ORAL
Refills: 0 | OUTPATIENT
Start: 2025-07-16

## 2025-07-16 RX ORDER — CEFAZOLIN 2 G/1
2 INJECTION, POWDER, FOR SOLUTION INTRAMUSCULAR; INTRAVENOUS
Status: COMPLETED | OUTPATIENT
Start: 2025-07-16 | End: 2025-07-16

## 2025-07-16 RX ORDER — PHENYLEPHRINE HYDROCHLORIDE 10 MG/ML
INJECTION INTRAVENOUS
Status: DISCONTINUED | OUTPATIENT
Start: 2025-07-16 | End: 2025-07-16

## 2025-07-16 RX ORDER — HYDROMORPHONE HYDROCHLORIDE 2 MG/ML
0.4 INJECTION, SOLUTION INTRAMUSCULAR; INTRAVENOUS; SUBCUTANEOUS EVERY 5 MIN PRN
Refills: 0 | OUTPATIENT
Start: 2025-07-16

## 2025-07-16 RX ORDER — SODIUM CHLORIDE 0.9 % (FLUSH) 0.9 %
3 SYRINGE (ML) INJECTION
OUTPATIENT
Start: 2025-07-16

## 2025-07-16 RX ORDER — KETAMINE HCL IN 0.9 % NACL 50 MG/5 ML
SYRINGE (ML) INTRAVENOUS
Status: DISCONTINUED | OUTPATIENT
Start: 2025-07-16 | End: 2025-07-16

## 2025-07-16 RX ORDER — MUPIROCIN 20 MG/G
OINTMENT TOPICAL
Status: DISCONTINUED | OUTPATIENT
Start: 2025-07-16 | End: 2025-07-16 | Stop reason: HOSPADM

## 2025-07-16 RX ORDER — PROPOFOL 10 MG/ML
VIAL (ML) INTRAVENOUS
Status: DISCONTINUED | OUTPATIENT
Start: 2025-07-16 | End: 2025-07-16

## 2025-07-16 RX ORDER — MIDAZOLAM HYDROCHLORIDE 2 MG/2ML
INJECTION, SOLUTION INTRAMUSCULAR; INTRAVENOUS
Status: COMPLETED
Start: 2025-07-16 | End: 2025-07-16

## 2025-07-16 RX ORDER — LIDOCAINE HYDROCHLORIDE 20 MG/ML
INJECTION INTRAVENOUS
Status: DISCONTINUED | OUTPATIENT
Start: 2025-07-16 | End: 2025-07-16

## 2025-07-16 RX ORDER — SODIUM CHLORIDE, SODIUM LACTATE, POTASSIUM CHLORIDE, CALCIUM CHLORIDE 600; 310; 30; 20 MG/100ML; MG/100ML; MG/100ML; MG/100ML
INJECTION, SOLUTION INTRAVENOUS CONTINUOUS
Status: DISCONTINUED | OUTPATIENT
Start: 2025-07-16 | End: 2025-07-16 | Stop reason: HOSPADM

## 2025-07-16 RX ORDER — FENTANYL CITRATE 50 UG/ML
INJECTION, SOLUTION INTRAMUSCULAR; INTRAVENOUS
Status: DISCONTINUED | OUTPATIENT
Start: 2025-07-16 | End: 2025-07-16

## 2025-07-16 RX ORDER — PROPOFOL 10 MG/ML
VIAL (ML) INTRAVENOUS CONTINUOUS PRN
Status: DISCONTINUED | OUTPATIENT
Start: 2025-07-16 | End: 2025-07-16

## 2025-07-16 RX ORDER — ROPIVACAINE HYDROCHLORIDE 5 MG/ML
INJECTION, SOLUTION EPIDURAL; INFILTRATION; PERINEURAL
Status: COMPLETED | OUTPATIENT
Start: 2025-07-16 | End: 2025-07-16

## 2025-07-16 RX ORDER — MIDAZOLAM HYDROCHLORIDE 1 MG/ML
INJECTION INTRAMUSCULAR; INTRAVENOUS
Status: DISCONTINUED | OUTPATIENT
Start: 2025-07-16 | End: 2025-07-16

## 2025-07-16 RX ORDER — IPRATROPIUM BROMIDE AND ALBUTEROL SULFATE 2.5; .5 MG/3ML; MG/3ML
3 SOLUTION RESPIRATORY (INHALATION) ONCE
OUTPATIENT
Start: 2025-07-16

## 2025-07-16 RX ORDER — PROCHLORPERAZINE EDISYLATE 5 MG/ML
5 INJECTION INTRAMUSCULAR; INTRAVENOUS EVERY 30 MIN PRN
OUTPATIENT
Start: 2025-07-16

## 2025-07-16 RX ORDER — LIDOCAINE HYDROCHLORIDE 10 MG/ML
0.5 INJECTION, SOLUTION EPIDURAL; INFILTRATION; INTRACAUDAL; PERINEURAL ONCE
Status: DISCONTINUED | OUTPATIENT
Start: 2025-07-16 | End: 2025-07-16 | Stop reason: HOSPADM

## 2025-07-16 RX ORDER — GLUCAGON 1 MG
1 KIT INJECTION
OUTPATIENT
Start: 2025-07-16

## 2025-07-16 RX ADMIN — MIDAZOLAM HYDROCHLORIDE 0.5 MG: 1 INJECTION, SOLUTION INTRAMUSCULAR; INTRAVENOUS at 10:07

## 2025-07-16 RX ADMIN — LIDOCAINE HYDROCHLORIDE 50 MG: 20 INJECTION, SOLUTION INTRAVENOUS at 09:07

## 2025-07-16 RX ADMIN — ROPIVACAINE HYDROCHLORIDE 30 ML: 5 INJECTION, SOLUTION EPIDURAL; INFILTRATION; PERINEURAL at 08:07

## 2025-07-16 RX ADMIN — PHENYLEPHRINE HYDROCHLORIDE 100 MCG: 10 INJECTION INTRAVENOUS at 10:07

## 2025-07-16 RX ADMIN — PROPOFOL 60 MCG/KG/MIN: 10 INJECTION, EMULSION INTRAVENOUS at 09:07

## 2025-07-16 RX ADMIN — CEFAZOLIN 2 G: 2 INJECTION, POWDER, FOR SOLUTION INTRAMUSCULAR; INTRAVENOUS at 09:07

## 2025-07-16 RX ADMIN — PROPOFOL 30 MG: 10 INJECTION, EMULSION INTRAVENOUS at 09:07

## 2025-07-16 RX ADMIN — FENTANYL CITRATE 50 MCG: 50 INJECTION, SOLUTION INTRAMUSCULAR; INTRAVENOUS at 11:07

## 2025-07-16 RX ADMIN — FENTANYL CITRATE 25 MCG: 50 INJECTION, SOLUTION INTRAMUSCULAR; INTRAVENOUS at 11:07

## 2025-07-16 RX ADMIN — Medication 20 MG: at 10:07

## 2025-07-16 RX ADMIN — PHENYLEPHRINE HYDROCHLORIDE 100 MCG: 10 INJECTION INTRAVENOUS at 11:07

## 2025-07-16 RX ADMIN — ALBUTEROL SULFATE 2.5 MG: 2.5 SOLUTION RESPIRATORY (INHALATION) at 08:07

## 2025-07-16 RX ADMIN — FENTANYL CITRATE 25 MCG: 50 INJECTION, SOLUTION INTRAMUSCULAR; INTRAVENOUS at 12:07

## 2025-07-16 RX ADMIN — FENTANYL CITRATE 100 MCG: 50 INJECTION, SOLUTION INTRAMUSCULAR; INTRAVENOUS at 08:07

## 2025-07-16 RX ADMIN — MIDAZOLAM HYDROCHLORIDE 2 MG: 1 INJECTION, SOLUTION INTRAMUSCULAR; INTRAVENOUS at 08:07

## 2025-07-16 RX ADMIN — SODIUM CHLORIDE, SODIUM LACTATE, POTASSIUM CHLORIDE, AND CALCIUM CHLORIDE: 600; 310; 30; 20 INJECTION, SOLUTION INTRAVENOUS at 09:07

## 2025-07-16 NOTE — H&P
See full H&P from clinic as per below. Patient had a tooth pulled 2 weeks ago but states that he had no complications, is not on abx, and is in no pain. Otherwise, no change in PMHx since the below H&P was written.     Patient ID: Jethro Uriarte III is a 49 y.o. male.     Chief Complaint: Follow-up of the Right Hand     History of Present Illness    CHIEF COMPLAINT:  Left hand 2nd through 5th MCP arthroplasty     HPI:  Mr. Uriarte presents for evaluation of the left hand, scheduled for a 2-3-5 MCP arthroplasty. His hand feels slightly stiff. He has previously undergone a similar procedure on the right hand. The caregiver reports improved maneuverability of his fingers and easier nail cutting since the previous surgery. The caregiver notes that it has been easier to maneuver his fingers and cut his nails since the surgery, and as long as they can perform nail care without causing facial scratches, the outcome is satisfactory.     He denies use of steroids, smoking, or nicotine use. No history of heart disease, clotting disorder, or diabetes is reported.     PREVIOUS TREATMENTS:  Mr. Uriarte previously underwent right hand MCP arthroplasty, which improved finger maneuverability and nail care.     SURGICAL HISTORY:  Mr. Uriarte has a history of right hand MCP arthroplasty. He also had fused joints in his left hand, and the second through fifth MCP joints fused in his right hand.     SOCIAL HISTORY:  Mr. Uriarte denies smoking and nicotine use.        ROS:  General: denies fever, denies chills, denies fatigue, denies weight gain, denies weight loss  Eyes: denies vision changes, denies redness, denies discharge  ENT: denies ear pain, denies nasal congestion, denies sore throat  Cardiovascular: denies chest pain, denies palpitations, denies lower extremity edema  Respiratory: denies cough, denies shortness of breath  Gastrointestinal: denies abdominal pain, denies nausea, denies vomiting, denies diarrhea, denies constipation, denies  blood in stool  Genitourinary: denies dysuria, denies hematuria, denies frequency  Musculoskeletal: denies joint pain, denies muscle pain, reports joint stiffness  Skin: denies rash, denies lesion  Neurological: denies headache, denies dizziness, denies numbness, denies tingling  Psychiatric: denies anxiety, denies depression, denies sleep difficulty          Physical Exam    MSK: Hand/Wrist - Left: Boutonniere deformity. Limited range of motion in MCP joints. Stiffness in fused joints.          Assessment & Plan     Left hand 2-3-5 MCP arthroplasty: replacing 2nd through 5th MCP joints.   Regional anesthesia for upper extremity numbness.   Implants typically last about 10 years before requiring revision.   Potential need for ligament balancing and soft tissue adjustment to stabilize joints.   Possible tendon release for finger with boutonniere deformity.      No follow-ups on file.     This note was generated with the assistance of ambient listening technology. Verbal consent was obtained by the patient and accompanying visitor(s) for the recording of patient appointment to facilitate this note. I attest to having reviewed and edited the generated note for accuracy, though some syntax or spelling errors may persist. Please contact the author of this note for any clarification.

## 2025-07-16 NOTE — BRIEF OP NOTE
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]    FOLLOWUP: In clinic    DISCHARGE INSTRUCTIONS:    Discharge Procedure Orders   Ambulatory Referral/Consult to Occupational Therapy   Standing Status: Future   Referral Priority: Routine Referral Type: Occupational Therapy   Referral Reason: Specialty Services Required   Requested Specialty: Occupational Therapy   Number of Visits Requested: 1     Diet general     Call MD for:  temperature >100.4     Call MD for:  persistent nausea and vomiting     Call MD for:  severe uncontrolled pain     Call MD for:  difficulty breathing, headache or visual disturbances     Call MD for:  redness, tenderness, or signs of infection (pain, swelling, redness, odor or green/yellow discharge around incision site)     Call MD for:  hives     Call MD for:  persistent dizziness or light-headedness     Call MD for:  extreme fatigue     Lifting restrictions   Order Comments: Do not lift more than the weight of a coffee cup for the next 2 weeks.     No  driving, operating heavy equipment or signing legal documents while taking pain medication     Keep surgical extremity elevated     Leave dressing on - Keep it clean, dry, and intact until clinic visit

## 2025-07-16 NOTE — ANESTHESIA POSTPROCEDURE EVALUATION
Anesthesia Post Evaluation    Patient: Jethro Ebbs III    Procedure(s) Performed: Procedure(s) (LRB):  LEFT FINGER  2nd-5th MCP JOINTS ARTHROPLASTY (Left)  LEFT INDEX FINGER TENDON REBALANCE (Left)    Final Anesthesia Type: MAC      Patient location during evaluation: OPS  Patient participation: Yes- Able to Participate  Level of consciousness: awake  Post-procedure vital signs: reviewed and stable  Pain management: adequate  Airway patency: patent    PONV status at discharge: No PONV  Anesthetic complications: no      Cardiovascular status: blood pressure returned to baseline and hemodynamically stable  Respiratory status: unassisted, spontaneous ventilation and nasal cannula  Hydration status: euvolemic  Follow-up not needed.              Vitals Value Taken Time   BP 96/59 07/16/25 12:37   Temp 98 07/16/25 12:37   Pulse 74 07/16/25 12:37   Resp 14 07/16/25 12:37   SpO2 98% 07/16/25 12:37         No case tracking events are documented in the log.      Pain/Tu Score: No data recorded

## 2025-07-16 NOTE — ANESTHESIA PROCEDURE NOTES
Peripheral Block    Patient location during procedure: pre-op    Reason for block: primary anesthetic    Diagnosis: L finger arthoplasty   Start time: 7/16/2025 8:33 AM  Timeout: 7/16/2025 8:32 AM   End time: 7/16/2025 8:36 AM    Staffing  Authorizing Provider: Fish Whyte MD  Performing Provider: Dann Khan MD    Staffing  Performed by: Dann Khan MD  Authorized by: Fish Whyte MD    Preanesthetic Checklist  Completed: patient identified, IV checked, site marked, risks and benefits discussed, surgical consent, monitors and equipment checked, pre-op evaluation and timeout performed  Peripheral Block  Patient position: supine  Prep: ChloraPrep  Patient monitoring: heart rate, cardiac monitor, continuous pulse ox, continuous capnometry and frequent blood pressure checks  Block type: supraclavicular  Laterality: left  Injection technique: single shot  Needle  Needle type: Stimuplex   Needle gauge: 22 G  Needle length: 2 in  Needle localization: anatomical landmarks and ultrasound guidance   -ultrasound image captured on disc.  Assessment  Injection assessment: negative aspiration, negative parasthesia and local visualized surrounding nerve  Paresthesia pain: none  Heart rate change: no  Slow fractionated injection: yes  Pain Tolerance: comfortable throughout block and no complaints  Medications:    Medications: ropivacaine (NAROPIN) injection 0.5% - Perineural   30 mL - 7/16/2025 8:36:00 AM    Additional Notes  VSS.  [facility] RN monitoring vitals throughout procedure.  Patient tolerated procedure well.

## 2025-07-17 ENCOUNTER — NURSE TRIAGE (OUTPATIENT)
Dept: ADMINISTRATIVE | Facility: CLINIC | Age: 50
End: 2025-07-17
Payer: OTHER GOVERNMENT

## 2025-07-17 ENCOUNTER — PATIENT MESSAGE (OUTPATIENT)
Dept: ORTHOPEDICS | Facility: CLINIC | Age: 50
End: 2025-07-17
Payer: OTHER GOVERNMENT

## 2025-07-17 ENCOUNTER — HOSPITAL ENCOUNTER (EMERGENCY)
Facility: OTHER | Age: 50
Discharge: HOME | End: 2025-07-17
Attending: EMERGENCY MEDICINE
Payer: MEDICARE

## 2025-07-17 VITALS
BODY MASS INDEX: 21.39 KG/M2 | DIASTOLIC BLOOD PRESSURE: 79 MMHG | RESPIRATION RATE: 15 BRPM | OXYGEN SATURATION: 96 % | TEMPERATURE: 98 F | WEIGHT: 141.13 LBS | SYSTOLIC BLOOD PRESSURE: 127 MMHG | HEIGHT: 68 IN | HEART RATE: 95 BPM

## 2025-07-17 DIAGNOSIS — G89.18 POSTOPERATIVE PAIN: Primary | ICD-10-CM

## 2025-07-17 PROCEDURE — 96372 THER/PROPH/DIAG INJ SC/IM: CPT | Performed by: EMERGENCY MEDICINE

## 2025-07-17 PROCEDURE — 99284 EMERGENCY DEPT VISIT MOD MDM: CPT | Mod: 25

## 2025-07-17 PROCEDURE — 63600175 PHARM REV CODE 636 W HCPCS: Performed by: EMERGENCY MEDICINE

## 2025-07-17 RX ORDER — MORPHINE SULFATE 2 MG/ML
6 INJECTION, SOLUTION INTRAMUSCULAR; INTRAVENOUS
Status: COMPLETED | OUTPATIENT
Start: 2025-07-17 | End: 2025-07-17

## 2025-07-17 RX ORDER — KETOROLAC TROMETHAMINE 30 MG/ML
15 INJECTION, SOLUTION INTRAMUSCULAR; INTRAVENOUS
Status: COMPLETED | OUTPATIENT
Start: 2025-07-17 | End: 2025-07-17

## 2025-07-17 RX ADMIN — MORPHINE SULFATE 6 MG: 2 INJECTION, SOLUTION INTRAMUSCULAR; INTRAVENOUS at 02:07

## 2025-07-17 RX ADMIN — KETOROLAC TROMETHAMINE 15 MG: 30 INJECTION, SOLUTION INTRAMUSCULAR; INTRAVENOUS at 02:07

## 2025-07-17 NOTE — ED PROVIDER NOTES
Source of History:  Patient  Chart    Chief complaint:  Wrist Pain (C/o pain to left wrist s/p surgery yesterday, States too Oxycodone with no relief in pain)      HPI:  Jethro Uriarte III is a 49 y.o. male presenting to emergency department with complaint of left hand pain.    Per chart review, patient underwent left finger 2nd through 5th MCP joint arthroplasty and left index finger tendon re balance with Dr. Lucas yesterday.  He tolerated the procedure well under peripheral block and was discharged home the same day.    Patient initially agitated and refusing to answer any questions about what is going on.  He keeps repeating this is my fault for having the surgery.   He is also stating get this thing off of me regarding his splint.    I spoke with the patient's significant other who states that he has been complaining of worsening pain over the last several hours despite taking 2 tablets of the prescribed oxycodone.  He has not taken any of the other prescribed medications including ibuprofen, Tylenol, or Zofran.    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    Pneumonia 2002    Polymyositis     Wheelchair dependence     can transfer     Past Surgical History:   Procedure Laterality Date    CAPSULOTOMY OF JOINT Right 6/26/2024    Procedure: INDEX, LONG, AND SMALL FINGER PIP VOLAR PLATE RELEAE AND FUSION WITH CAPSULOTOMY;  Surgeon: Rhoda Lucas MD;  Location: [facility] OR;  Service: Orthopedics;  Laterality: Right;    CLOSED REDUCTION, FRACTURE, MCP OR PIP JOINT, ARTICULAR  6/26/2024    Procedure: CLOSED REDUCTION, PIP JOINT, ARTICULAR, RING FINGER;  Surgeon: Rhoda Lucas MD;  Location: [facility] OR;  Service: Orthopedics;;    FUSION OF INTERPHALANGEAL JOINT OF FINGER Left  2/5/2025    Procedure: FUSION, IP JOINT, FINGER;  Surgeon: Rhoda Lucas MD;  Location: [facility] [place] OR;  Service: Orthopedics;  Laterality: Left;  LEFT INDEX LONG , RING AND SMALL HAND PIP ARTHRODESIS WITH HARDWARE    RELEASE OF CONTRACTURE OF JOINT Right 6/26/2024    Procedure: RIGHT 2ND, 3RD, 4TH AND 5TH MCPJ SOFT TISSUE (WITH CAPSULOTOMIES) AND BALANCING WITH ARTHTOPLASTIES;  Surgeon: Rhoda Lucas MD;  Location: [facility] [place] OR;  Service: Orthopedics;  Laterality: Right;  MAC/REGIONAL    REMOVAL OF HARDWARE FROM HAND Right 8/21/2024    Procedure: REMOVAL, HARDWARE, HAND;  Surgeon: Rhoda Lucas MD;  Location: [facility] [place] OR;  Service: Orthopedics;  Laterality: Right;  PIN REMOVAL RIGHT    REVISION OF SCAR USING Z-PLASTY Right 6/26/2024    Procedure: BOUTIENIERE DEFORMITY OF THUMB CORRECTION;  Surgeon: Rhoda Lucas MD;  Location: [facility];  Service: Orthopedics;  Laterality: Right;    TOE AMPUTATION Left     2 toes       Social History     Socioeconomic History    Marital status:    Tobacco Use    Smoking status: Never    Smokeless tobacco: Never   Substance and Sexual Activity    Alcohol use: Never    Drug use: Never       No family history on file.    Physical Exam:      Vitals:    07/17/25 0353   BP: 127/79   Pulse: 95   Resp: 15   Temp: 98.1 °F (36.7 °C)     Gen:  Uncomfortable, nontoxic.  Clutching his left hand, which is splinted, in his right hand.  Mental Status:  Alert and oriented.  Appropriate, conversant.  Skin: Warm, dry. No rashes seen.  Eyes: No conjunctival injection.  Pulm: No increased work of breathing.  No significant tachypnea.  No audible stridor or wheezing.  No conversational dyspnea.    CV: Regular rate. Regular rhythm.   Abd: Soft.  Not distended.  Nontender.   MSK:  Left hand is splinted.  He has brisk capillary refill.  The fingertips for swollen.  Sensation is intact.  Difficult to assess for duskiness given his skin tone.    Neuro: Awake. Speech normal. No focal  neuro deficit observed.    Laboratory Studies:  Labs Reviewed - No data to display    Chart reviewed.     Imaging Results    None         Medications Given:  Medications   morphine injection 6 mg (6 mg Intramuscular Given 7/17/25 0227)   ketorolac injection 15 mg (15 mg Intramuscular Given 7/17/25 0228)       MDM:    49 y.o. male with postoperative pain in the left hand.  He is afebrile and hemodynamically stable.  He has intact sensation and capillary refill.  He is taking oxycodone as directed.  I loosened the Ace wrap on the splint.  Will give dose of IM Toradol and morphine here and reassess.    2:55 AM  Attempted to page the hand surgeon through the hospital , unable to do so.   will not release any contact information for the physician.  They are recommending secure chat.  A secure chat was sent.    Patient was reassessed after receiving pain medication, states that he has really no significant improvement.  He and his wife are asking that a remove the splint, and reapply it.  After long discussion with them, I believe this is reasonable, given that the initial attempt to loosen the splint did not provide any relief.    This point was removed, and reapplied.  The Ace wrap and padding were applied in a looser fashion.  Patient reported significant relief of his pain after this.    Diagnostic Impression:    1. Postoperative pain         ED Disposition Condition    Discharge Stable          ED Prescriptions    None       Follow-up Information       Follow up With Specialties Details Why Contact Info    Edward Van MD Internal Medicine Schedule an appointment as soon as possible for a visit   [address] [facility] [address] [address]  [phone]      Rhoda Lucas MD Hand Surgery, Orthopedic Surgery Schedule an appointment as soon as possible for a visit today  [address] [facility] [address]  [facility] [address]  [phone]              [facility]  MDM  MDCalc MDM Module  Jul 17 2025 5:35 AM [Omayra Lezama]  Additional encounter diagnoses: Postoperative pain  Risk: morphine injection (Parenteral controlled substances)      Patient understands the plan and is in agreement, verbalized understanding, questions answered    Omayra Lezama MD  Emergency Medicine           [1]   Current Facility-Administered Medications on File Prior to Encounter   Medication Dose Route Frequency Provider Last Rate Last Admin    [COMPLETED] albuterol sulfate nebulizer solution 2.5 mg  2.5 mg Nebulization Once Pre-Op Alayna Caldwell, FNP   2.5 mg at 07/16/25 0801    [COMPLETED] ceFAZolin 2 g  2 g Intravenous On Call Procedure RAKAN Ramirez MD   2 g at 07/16/25 0938    [COMPLETED] midazolam (PF) (VERSED) 1 mg/mL injection             [COMPLETED] ROPIvacaine 0.5% (PF) injection   Perineural  Fish Whyte MD   30 mL at 07/16/25 0836    [DISCONTINUED] fentaNYL 50 mcg/mL injection   Intravenous PRN Jazzy Mccartney, RN   25 mcg at 07/16/25 1204    [DISCONTINUED] ketamine in 0.9 % sod chloride 50 mg/5 mL (10 mg/mL) injection   Intravenous PRN Leah Dougherty, CRNA   20 mg at 07/16/25 1055    [DISCONTINUED] lactated ringers infusion   Intravenous Continuous Alayna Caldwell, FNP   Stopped at 07/16/25 1224    [DISCONTINUED] LIDOcaine (cardiac) injection   Intravenous PRN Leah Dougherty, CRNA   50 mg at 07/16/25 0936    [DISCONTINUED] LIDOcaine (PF) 10 mg/ml (1%) injection 5 mg  0.5 mL Intradermal Once Alayna Caldwell, FNP        [DISCONTINUED] midazolam injection   Intravenous PRN Jazzy Mccartney, RN   0.5 mg at 07/16/25 1030    [DISCONTINUED] mupirocin 2 % ointment   Nasal On Call Procedure RAKAN Ramirez MD        [DISCONTINUED] PHENYLephrine injection   Intravenous PRN Leah Dougherty, CRNA   100 mcg at 07/16/25 1153    [DISCONTINUED] propofol (DIPRIVAN) 10 mg/mL infusion   Intravenous PRN Leah Dougherty R., CRNA   30 mg at 07/16/25 0936    [DISCONTINUED]  propofol (DIPRIVAN) 10 mg/mL infusion   Intravenous Continuous PRN Leah Dougherty, CRNA   Stopped at 07/16/25 7918     Current Outpatient Medications on File Prior to Encounter   Medication Sig Dispense Refill    acetaminophen (TYLENOL) 500 MG tablet Take 2 tablets (1,000 mg total) by mouth 2 (two) times daily as needed for Pain. Begin post op day 3. 50 tablet 0    albuterol (PROVENTIL) 2.5 mg /3 mL (0.083 %) nebulizer solution Take 2.5 mg by nebulization every 6 (six) hours as needed for Wheezing. Rescue      ALBUTEROL INHL Inhale into the lungs as needed.      APPLE CIDER VINEGAR ORAL Take by mouth.      CALCIUM ORAL Take by mouth once daily.      docusate sodium (COLACE) 250 MG capsule Take 250 mg by mouth once daily.      ergocalciferol, vitamin D2, (VITAMIN D ORAL) Take by mouth once daily.      ibuprofen (ADVIL,MOTRIN) 600 MG tablet Take 1 tablet (600 mg total) by mouth 3 (three) times daily as needed for Pain. Bedside Delivery 45 tablet 0    mirtazapine (REMERON) 7.5 MG Tab Take 7.5 mg by mouth every evening.      mycophenolate mofetil (CELLCEPT ORAL) Take 1,000 mg by mouth 2 (two) times a day.      ondansetron (ZOFRAN) 4 MG tablet Take 1 tablet (4 mg total) by mouth every 8 (eight) hours as needed for Nausea. 10 tablet 0    oxyCODONE-acetaminophen (PERCOCET) 5-325 mg per tablet Take 1 tablet by mouth every 4 to 6 hours as needed for Pain ((moderate-severe)). PO day 1-2 10 tablet 0    RITUXIMAB IV Inject into the vein every 6 (six) months.      TURMERIC ORAL Take by mouth.      vitamin D (VITAMIN D3) 1000 units Tab Take 25 mcg by mouth.          Omayra Lezama MD  07/17/25 4981

## 2025-07-17 NOTE — TELEPHONE ENCOUNTER
Caller states pt has 10/10 s/p finger surgery yesterday. Caller states pt has taken pain medication without relief. No on call provider listed for hand clinic at [facility] or [facility] at this time.  Pt advised ED per protocol and verbalized understanding.   Reason for Disposition   [1] SEVERE post-op pain (e.g., excruciating, pain scale 8-10) AND [2] not controlled with pain medications    Additional Information   Negative: Sounds like a life-threatening emergency to the triager   Negative: [1] Widespread rash AND [2] bright red, sunburn-like   Negative: [1] Vomiting AND [2] persists > 4 hours   Negative: [1] Vomiting AND [2] abdomen looks much more swollen than usual   Negative: [1] SEVERE headache (e.g., excruciating) AND [2] after spinal (epidural) anesthesia   Negative: [1] Drinking very little AND [2] dehydration suspected (e.g., no urine > 12 hours, very dry mouth, very lightheaded)   Negative: Patient sounds very sick or weak to the triager   Negative: Sounds like a serious complication to the triager   Negative: Fever > 100.4 F (38.0 C)    Protocols used: Post-Op Symptoms and [id number]

## 2025-07-17 NOTE — ED TRIAGE NOTES
Pt presents to ED w/ c/o L wrist pain. Pt reports he had surgery yesterday morning on his wrist, splint applied. Pt reports taking his prescribed pain medication w/o any relief. AAOx4. NAD noted

## 2025-07-17 NOTE — DISCHARGE INSTRUCTIONS
Follow-Up Plan:  - Follow-up with Dr. Lucas    Return to the Emergency Department for symptoms including but not limited to: worsening symptoms, shortness of breath or chest pain, vomiting with inability to hold down fluids, fevers greater than 100.4°F, passing out/fainting/unconsciousness, or other concerning symptoms.

## 2025-07-18 ENCOUNTER — OFFICE VISIT (OUTPATIENT)
Dept: ORTHOPEDICS | Facility: CLINIC | Age: 50
End: 2025-07-18
Payer: OTHER GOVERNMENT

## 2025-07-18 ENCOUNTER — TELEPHONE (OUTPATIENT)
Dept: ORTHOPEDICS | Facility: CLINIC | Age: 50
End: 2025-07-18
Payer: OTHER GOVERNMENT

## 2025-07-18 DIAGNOSIS — Z98.890 POST-OPERATIVE STATE: Primary | ICD-10-CM

## 2025-07-18 DIAGNOSIS — M19.049 ARTHRITIS OF FINGER: ICD-10-CM

## 2025-07-18 DIAGNOSIS — M24.549 CONTRACTURE OF JOINT OF FINGER, UNSPECIFIED LATERALITY: ICD-10-CM

## 2025-07-18 PROCEDURE — 99999 PR PBB SHADOW E&M-EST. PATIENT-LVL I: CPT | Mod: PBBFAC,,, | Performed by: SPECIALIST/TECHNOLOGIST

## 2025-07-18 PROCEDURE — 99211 OFF/OP EST MAY X REQ PHY/QHP: CPT | Mod: PBBFAC | Performed by: SPECIALIST/TECHNOLOGIST

## 2025-07-18 NOTE — PROGRESS NOTES
Patient is 2 days status post left 2nd through 5th MCP joint arthroplasty.  He reports that the splint was placed on his hand too tightly.  He went to the emergency room where the splint was removed and the ortho plaster was removed and replaced with just soft dressing.  He presents today for dressing change.  The soft dressing was removed revealing no signs of infection or drainage.    He new volar ortho plaster splint was applied to the patient's supporting the MP joints and keeping the DIP joints free.  Patient we will follow up in clinic in a week and a half for suture removal   We will place therapy referral to make a custom orthosis at that visit.

## 2025-07-18 NOTE — TELEPHONE ENCOUNTER
Pt called this morning to adjust his two week PO appt due to surgery his wife will be having that same day, 07/30/25. Advised pt that we could see him back 7/31, 8/01/25 or  8/04/25. Pt opted for and agreed to be seen Friday 08/01/25 early in the morning. Pt went on to discuss his recent visit to the ED due to pain from PO swelling and splint discomfort. Pt stated that he removed the plaster splint prior to his visit to the ED, one day after surgery and subsequently removed the ED placed splint same day. Pt questioned as to why the splint was needed and stated that it was causing his wrist discomfort. Pt was advised to come in to the [facility] for re-splinting as that is was required s/p his MCP silastic implant surgery. Pt was educated as to the purpose of the splint for rest, healing and pain management. He reluctantly agreed to come in today for this to be addressed and was advised that the [facility] should be contacted for any and all splint issues for further directives. Patient verbalized understanding and was thankful.

## 2025-07-22 NOTE — OP NOTE
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]    Post-Operative Diagnosis: Post-Op Diagnosis Codes:     * Finger deformity, left [M20.002]     * 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]    Anesthesia: Monitor Anesthesia Care    Technical Procedures Used: surgery    Description of the Findings of the Procedure:  Indication for procedure Mr Uriarte is a 49-year-old male with scleroderma he had contractures involving his entire left hand he had undergone procedures on his right hand had done very well he was here for the 2nd stage procedure of his left hand in which she would have 2nd through the 5th MCP joint arthroplasty with silastic implants again he had done very well from his right side he had significant fractures of his left hand inability to extend his fingers to grasp things his thumb had a significant boutonniere deformity and that could grasp some things between his thumb and his index however the rest of his fingers were stuck in flexion after much discussion with the patient elected for surgical intervention risks and benefits were explained to the patient in clinic consents were signed in clinic    Procedure in detail the correct site was marked with the patient's participation the holding area patient underwent regional seizure was brought to the operating placed in supine position underwent general anesthesia his left upper extremity was prepped draped normal sterile fashion a well-padded tourniquet was placed in the left upper extremity a time-out was called conducted for the correct procedure to be  indicated IV antibiotics given patient preoperatively a longitudinal incision was marked out over the metacarpal heads of the left hand the arm was exsanguinated an Esmarch tourniquet was insufflated 250 mmHg incision was made careful dissection down to each joint starting with the 2nd the 3rd the 4th and the 5th MCP joints sagittal band was released as well as the capsule the joint was dislocated after collateral ligaments were slightly recessed using a saw the metacarpal head was removed 1st for the 2nd MCP joint a curette was used to remove the cartilage from the base of P1 once that was completed we used the SPI system in which starting awl was used for the metacarpal and then reamed to a size 20 our attention was then turned to the base of P1 starting awl was used and then reamed to a size 20 all of this was done under C-arm fluoroscopy to confirm proper positioning trial was then placed in the size 20 was a good fit this was also required soft tissue balancing patient was very tight ulnarly and therefore this was also released for soft tissue balancing our attention was then turned to the 3rd MCP joint again the sagittal band was released capsule was released using the FBI system after the saw was used to cut off the Medrol carpal head and a curette was used to remove the cartilage at the base of P1 the starting awl was used broached to a size 30 in the metacarpal our attention was then turned to P1 this was also broached to a size 30 trial was then placed because of the tightness ulnarly this was also released in order to balance the extensor tendon over the metacarpal joint the same procedure was performed for the 4th and 5th MCP joints in which a size 20 and then a size 10 silastic implant was placed with each 1 trial was placed and it was well fixed we then placed the actual implant size 20 in the 2nd MCP joint size 30 in the 3rd MCP joint size 20 in the 4th MCP joint and size 10 in the 5th MCP joint with  each 1 soft tissue balancing was required in which it was released ulnarly and then so held in suture position with FiberWire until the extensor tendon was placed directly over the MCP joint multiple x-rays were taken during this motion was assessed showed that this was a good fixed the areas irrigated copious amounts normal saline Vicryl Monocryl Dermabond closed the skin sterile dressing was applied patient was placed in a well-padded splint tolerated suture was brought to cover room stable condition     Postop plans patient he is to keep the dressing clean dry and intact in the exactly 1 week he can start therapy but brace during that time    Significant Surgical Tasks Conducted by the Assistant(s), if Applicable: retraction    Complications: No    Estimated Blood Loss (EBL): * No values recorded between 7/16/2025  9:30 AM and 7/16/2025 12:37 PM *           Implants:   Implant Name Type Inv. Item Serial No.  Lot No. LRB No. Used Action   JOINT MP SZ20 SILICONE - [id number]  JOINT MP SZ20 SILICONE  [employer] MYKEL. [id number] Left 1 Implanted   JOINT MP SZ20 SILICONE - [id number]  JOINT MP SZ20 SILICONE  [employer] MYKEL. [id number] Left 1 Implanted   JOINT FINGER METACARP SZ 10 - [id number]  JOINT FINGER METACARP SZ 10  [employer] MYKEL. [id number] Left 1 Implanted   JOINT FINGER METACARP SZ 30     [id number] Left 1 Implanted       Specimens:   Specimen (24h ago, onward)      None                    Condition: Good    Disposition: PACU - hemodynamically stable.    Attestation: I performed the procedure.    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 Begin post op day 3.     ibuprofen 600 MG tablet  Commonly known as: ADVIL,MOTRIN  Take 1 tablet (600 mg total) by mouth 3 (three) times daily as needed for Pain. Bedside Delivery     ondansetron 4 MG tablet  Commonly known as: ZOFRAN  Take 1 tablet (4 mg total) by mouth every 8 (eight) hours as needed for Nausea.     oxyCODONE-acetaminophen 5-325 mg per tablet  Commonly known as: PERCOCET  Take 1 tablet by mouth every 4 to 6 hours as needed for Pain ((moderate-severe)). PO day 1-2            CONTINUE taking these medications      albuterol 2.5 mg /3 mL (0.083 %) nebulizer solution  Commonly known as: PROVENTIL  Take 2.5 mg by nebulization every 6 (six) hours as needed for Wheezing. Rescue     ALBUTEROL INHL  Inhale into the lungs as needed.     APPLE CIDER VINEGAR ORAL  Take by mouth.     CALCIUM ORAL  Take by mouth once daily.     CELLCEPT ORAL  Take 1,000 mg by mouth 2 (two) times a day.     docusate sodium 250 MG capsule  Commonly known as: COLACE  Take 250 mg by mouth once daily.     mirtazapine 7.5 MG Tab  Commonly known as: REMERON  Take 7.5 mg by mouth every evening.     RITUXIMAB IV  Inject into the vein every 6 (six) months.     TURMERIC ORAL  Take by mouth.     vitamin D 1000 units Tab  Commonly known as: VITAMIN D3  Take 25 mcg by mouth.     VITAMIN D ORAL  Take by mouth once daily.            Discharge Procedure Orders   Ambulatory Referral/Consult to Occupational Therapy   Standing Status: Future   Referral Priority: Routine Referral Type: Occupational Therapy   Referral Reason: Specialty Services Required   Requested Specialty: Occupational Therapy   Number of Visits Requested: 1     Diet general     Call MD for:  temperature >100.4     Call  MD for:  persistent nausea and vomiting     Call MD for:  severe uncontrolled pain     Call MD for:  difficulty breathing, headache or visual disturbances     Call MD for:  redness, tenderness, or signs of infection (pain, swelling, redness, odor or green/yellow discharge around incision site)     Call MD for:  hives     Call MD for:  persistent dizziness or light-headedness     Call MD for:  extreme fatigue     Lifting restrictions   Order Comments: Do not lift more than the weight of a coffee cup for the next 2 weeks.     No driving, operating heavy equipment or signing legal documents while taking pain medication     Keep surgical extremity elevated     Leave dressing on - Keep it clean, dry, and intact until clinic visit

## 2025-08-01 ENCOUNTER — OFFICE VISIT (OUTPATIENT)
Dept: ORTHOPEDICS | Facility: CLINIC | Age: 50
End: 2025-08-01
Payer: OTHER GOVERNMENT

## 2025-08-01 VITALS — BODY MASS INDEX: 21.39 KG/M2 | HEIGHT: 68 IN | WEIGHT: 141.13 LBS

## 2025-08-01 DIAGNOSIS — T14.8XXA SKIN ABRASION: ICD-10-CM

## 2025-08-01 DIAGNOSIS — Z98.890 POST-OPERATIVE STATE: Primary | ICD-10-CM

## 2025-08-01 PROCEDURE — 29085 APPL CAST HAND&LWR FOREARM: CPT | Mod: S$PBB,LT,, | Performed by: PHYSICIAN ASSISTANT

## 2025-08-01 PROCEDURE — 99999 PR PBB SHADOW E&M-EST. PATIENT-LVL III: CPT | Mod: PBBFAC,,, | Performed by: PHYSICIAN ASSISTANT

## 2025-08-01 PROCEDURE — 99024 POSTOP FOLLOW-UP VISIT: CPT | Mod: ,,, | Performed by: PHYSICIAN ASSISTANT

## 2025-08-01 PROCEDURE — 99999PBSHW PR PBB SHADOW TECHNICAL ONLY FILED TO HB: Mod: PBBFAC,,,

## 2025-08-01 PROCEDURE — 99213 OFFICE O/P EST LOW 20 MIN: CPT | Mod: PBBFAC,25 | Performed by: PHYSICIAN ASSISTANT

## 2025-08-01 PROCEDURE — 29085 APPL CAST HAND&LWR FOREARM: CPT | Mod: PBBFAC | Performed by: PHYSICIAN ASSISTANT

## 2025-08-01 RX ORDER — SULFAMETHOXAZOLE AND TRIMETHOPRIM 400; 80 MG/1; MG/1
1 TABLET ORAL 2 TIMES DAILY
Qty: 14 TABLET | Refills: 0 | Status: SHIPPED | OUTPATIENT
Start: 2025-08-01

## 2025-08-01 NOTE — PROGRESS NOTES
Mirella Voss, [facility] [place], PA-C  Orthopedic Hand and Upper Extremity  Subjective:       Patient ID: Jethro Uriarte III is a 49 y.o. male.    Chief Complaint: Post-op Evaluation and Pain of the Left Hand    History of Present Illness on 08/01/2025  The patient is a 49 y.o. male who presents today for post-operative evaluation status post left 2nd through 5th MCP arthroplasty by Dr. Lucas on 07/16/2025. He reports that he is frustrated with repetitive placement of splints. He has presented to ED on 7/16 for splint removal had it again replaced on 7/18/2025. Pain is controlled better since last visit. He denies fever, chills, and sweats since the time of the surgery. Throughout the encounter, patient was advised to refrain from peeling/picking at skin abrasion over fracture blisters.     The pain is currently rated 2/10 in severity. Patient hesitant to allow re splinting/casting today, a he would like to be able to use his hands.        Past Medical History:   Diagnosis Date    Bronchospasm     from damage from pneumonia 2022    Contracture of right hand 06/26/2024    Pneumonia 2002    Polymyositis     Wheelchair dependence     can transfer     Past Surgical History:   Procedure Laterality Date    ARTHROPLASTY OF JOINT OF FINGER Left 7/16/2025    Procedure: LEFT FINGER  2nd-5th MCP JOINTS ARTHROPLASTY;  Surgeon: Rhoda Lucas MD;  Location: [facility];  Service: Orthopedics;  Laterality: Left;  MAC/REGIONAL    CAPSULOTOMY OF JOINT Right 6/26/2024    Procedure: INDEX, LONG, AND SMALL FINGER PIP VOLAR PLATE RELEAE AND FUSION WITH CAPSULOTOMY;  Surgeon: Rhoda Lucas MD;  Location: [facility] OR;  Service: Orthopedics;  Laterality: Right;    CLOSED REDUCTION, FRACTURE, MCP OR PIP JOINT, ARTICULAR  6/26/2024    Procedure: CLOSED REDUCTION, PIP JOINT, ARTICULAR, RING FINGER;  Surgeon: Rhoda Lucas MD;  Location: [facility];  Service: Orthopedics;;    FINGER TENDON REPAIR Left 7/16/2025    Procedure: LEFT  "INDEX FINGER TENDON REBALANCE;  Surgeon: Rhoda Lucas MD;  Location: [facility] OR;  Service: Orthopedics;  Laterality: Left;    FUSION OF INTERPHALANGEAL JOINT OF FINGER Left 2/5/2025    Procedure: FUSION, IP JOINT, FINGER;  Surgeon: Rhoda Lucas MD;  Location: [facility] OR;  Service: Orthopedics;  Laterality: Left;  LEFT INDEX LONG , RING AND SMALL HAND PIP ARTHRODESIS WITH HARDWARE    RELEASE OF CONTRACTURE OF JOINT Right 6/26/2024    Procedure: RIGHT 2ND, 3RD, 4TH AND 5TH MCPJ SOFT TISSUE (WITH CAPSULOTOMIES) AND BALANCING WITH ARTHTOPLASTIES;  Surgeon: Rhoda Lucas MD;  Location: [facility] OR;  Service: Orthopedics;  Laterality: Right;  MAC/REGIONAL    REMOVAL OF HARDWARE FROM HAND Right 8/21/2024    Procedure: REMOVAL, HARDWARE, HAND;  Surgeon: Rhoda Lucas MD;  Location: [facility] OR;  Service: Orthopedics;  Laterality: Right;  PIN REMOVAL RIGHT    REVISION OF SCAR USING Z-PLASTY Right 6/26/2024    Procedure: BOUTIENIERE DEFORMITY OF THUMB CORRECTION;  Surgeon: Rhoda Lucas MD;  Location: [facility] OR;  Service: Orthopedics;  Laterality: Right;    TOE AMPUTATION Left     2 toes     No family history on file.  Social History     Socioeconomic History    Marital status:    Tobacco Use    Smoking status: Never    Smokeless tobacco: Never   Substance and Sexual Activity    Alcohol use: Never    Drug use: Never       Current Medications[1]  Review of patient's allergies indicates:  No Known Allergies    Review of Systems    The remainder of a 14-point ROS has been performed and is otherwise negative.    Objective:      Vitals:    08/01/25 0810   Weight: 64 kg (141 lb 1.5 oz)   Height: 5' 8" (1.727 m)   PainSc:   2   PainLoc: Hand   Estimated body mass index is 21.45 kg/m² as calculated from the following:    Height as of this encounter: 5' 8" (1.727 m).    Weight as of this encounter: 64 kg (141 lb 1.5 oz).    Physical exam:   General: Well developed & well nourished, alert and " oriented x 3, in no acute distress, with normal mood and affect  Vital signs are stable, patient is afebrile.  Patient is well dressed and well groomed, no acute distress.  Alert and oriented to person, place, and time.  Post op splinting removed. Incision intact with exudate and bleeding; no pus or foul odor  There is no evidence of current infection. He is NVI.       Diagnostics Review:   No updated imaging obtained today.     Assessment:     Jethro is a 49 y.o. male 2 weeks status post left 2nd through 5th MCP arthroplasty on 07/16/2025    1. status post 2-5 MCP arthroplasty    2. Skin abrasion        Plan:       I had a lengthy discussion with Jethro about their recovery from surgery thus far and moving forward. Patient educated on post-operative expectations, including paresthesia/burning sensation that radiates from incision to associated dermatomes as well as tender/painful scars; these symptoms typically resolve within 6-12 weeks of surgery.    Patient counseled on risk of open skin wound surrounding recent joint replacement and repetitively encouraged him to avoid touching/pulling skin off of wound. Patient verbalized understanding. Given his lack of compliance and consultation with Dr. Lucas I recommended casting for 1 week to allow for wound healing. Adaptic applied to wound and an ulnar gutter plaster fiberglass casting to include immobilization of digits 2-5. Appropriate cast care instructions provided and reviewed by myself and , Rey.     Additionally, I prescribed a course of Bactrim for prophylaxis.    We discussed the red flag symptoms that should prompt an urgent phone call and/or presentation to emergency department, including but not limited to, new motor weakness, new or worsening pain, or changes to sensation.    All questions and concerns were addressed during this visit, and the patient expressed understanding and agreement with our plan. They are encouraged to call  and/or return to clinic at any time if issues arise.    Follow Up: in 1 week with Dr. Lucas for treatment response; consider cast removal   Imaging needed prior to next visit: XR left hand needed prior to next visit; no other imagines necessary unless otherwise indicated           Mirella oVss [facility] [place]  Hand & Upper Extremity Orthopedics  [facility]  08/01/2025 at 8:27 AM    This note was generated with the assistance of ambient listening technology. Verbal consent was obtained by the patient and accompanying visitor(s) for the recording of patient appointment to facilitate this note. I attest to having reviewed and edited the generated note for accuracy, though some syntax or spelling errors may persist. Please contact the author of this note for any clarification.    Future Appointments   Date Time Provider Department Center   8/7/2025 11:00 AM Rhoda Lucas MD [facility]          [1]   Current Outpatient Medications   Medication Sig Dispense Refill    acetaminophen (TYLENOL) 500 MG tablet Take 2 tablets (1,000 mg total) by mouth 2 (two) times daily as needed for Pain. Begin post op day 3. 50 tablet 0    albuterol (PROVENTIL) 2.5 mg /3 mL (0.083 %) nebulizer solution Take 2.5 mg by nebulization every 6 (six) hours as needed for Wheezing. Rescue      ALBUTEROL INHL Inhale into the lungs as needed.      APPLE CIDER VINEGAR ORAL Take by mouth.      CALCIUM ORAL Take by mouth once daily.      docusate sodium (COLACE) 250 MG capsule Take 250 mg by mouth once daily.      ergocalciferol, vitamin D2, (VITAMIN D ORAL) Take by mouth once daily.      ibuprofen (ADVIL,MOTRIN) 600 MG tablet Take 1 tablet (600 mg total) by mouth 3 (three) times daily as needed for Pain. Bedside Delivery 45 tablet 0    mirtazapine (REMERON) 7.5 MG Tab Take 7.5 mg by mouth every evening.      mycophenolate mofetil (CELLCEPT ORAL) Take 1,000 mg by mouth 2 (two) times a day.      ondansetron (ZOFRAN) 4 MG  tablet Take 1 tablet (4 mg total) by mouth every 8 (eight) hours as needed for Nausea. 10 tablet 0    oxyCODONE-acetaminophen (PERCOCET) 5-325 mg per tablet Take 1 tablet by mouth every 4 to 6 hours as needed for Pain ((moderate-severe)). PO day 1-2 10 tablet 0    RITUXIMAB IV Inject into the vein every 6 (six) months.      TURMERIC ORAL Take by mouth.      vitamin D (VITAMIN D3) 1000 units Tab Take 25 mcg by mouth.       No current facility-administered medications for this visit.

## 2025-08-07 ENCOUNTER — OFFICE VISIT (OUTPATIENT)
Dept: ORTHOPEDICS | Facility: CLINIC | Age: 50
End: 2025-08-07
Payer: MEDICARE

## 2025-08-07 VITALS — WEIGHT: 141.13 LBS | HEIGHT: 68 IN | BODY MASS INDEX: 21.39 KG/M2

## 2025-08-07 DIAGNOSIS — Z98.890 POST-OPERATIVE STATE: Primary | ICD-10-CM

## 2025-08-07 PROCEDURE — 99999PBSHW PR PBB SHADOW TECHNICAL ONLY FILED TO HB: Mod: PBBFAC,,,

## 2025-08-07 PROCEDURE — 99999 PR PBB SHADOW E&M-EST. PATIENT-LVL III: CPT | Mod: PBBFAC,,, | Performed by: ORTHOPAEDIC SURGERY

## 2025-08-07 PROCEDURE — 29075 APPL CST ELBW FNGR SHORT ARM: CPT | Mod: PBBFAC | Performed by: ORTHOPAEDIC SURGERY

## 2025-08-07 PROCEDURE — 99213 OFFICE O/P EST LOW 20 MIN: CPT | Mod: PBBFAC | Performed by: ORTHOPAEDIC SURGERY

## 2025-08-14 ENCOUNTER — OFFICE VISIT (OUTPATIENT)
Dept: ORTHOPEDICS | Facility: CLINIC | Age: 50
End: 2025-08-14
Payer: MEDICARE

## 2025-08-14 DIAGNOSIS — Z98.890 POST-OPERATIVE STATE: Primary | ICD-10-CM

## 2025-08-14 DIAGNOSIS — M79.642 LEFT HAND PAIN: Primary | ICD-10-CM

## 2025-08-14 PROCEDURE — 99999PBSHW PR PBB SHADOW TECHNICAL ONLY FILED TO HB: Mod: PBBFAC,,,

## 2025-08-14 PROCEDURE — 29085 APPL CAST HAND&LWR FOREARM: CPT | Mod: PBBFAC | Performed by: SPECIALIST/TECHNOLOGIST

## 2025-08-14 PROCEDURE — 99213 OFFICE O/P EST LOW 20 MIN: CPT | Mod: PBBFAC | Performed by: SPECIALIST/TECHNOLOGIST

## 2025-08-14 PROCEDURE — 99999 PR PBB SHADOW E&M-EST. PATIENT-LVL III: CPT | Mod: PBBFAC,,, | Performed by: SPECIALIST/TECHNOLOGIST

## 2025-08-20 ENCOUNTER — PATIENT MESSAGE (OUTPATIENT)
Dept: ORTHOPEDICS | Facility: CLINIC | Age: 50
End: 2025-08-20
Payer: MEDICARE

## 2025-08-21 ENCOUNTER — OFFICE VISIT (OUTPATIENT)
Dept: ORTHOPEDICS | Facility: CLINIC | Age: 50
End: 2025-08-21
Payer: OTHER GOVERNMENT

## 2025-08-21 ENCOUNTER — HOSPITAL ENCOUNTER (OUTPATIENT)
Dept: RADIOLOGY | Facility: OTHER | Age: 50
Discharge: HOME | End: 2025-08-21
Attending: SPECIALIST/TECHNOLOGIST
Payer: OTHER GOVERNMENT

## 2025-08-21 DIAGNOSIS — Z98.890 POST-OPERATIVE STATE: Primary | ICD-10-CM

## 2025-08-21 DIAGNOSIS — M79.642 LEFT HAND PAIN: ICD-10-CM

## 2025-08-21 PROCEDURE — 99213 OFFICE O/P EST LOW 20 MIN: CPT | Mod: PBBFAC,25 | Performed by: SPECIALIST/TECHNOLOGIST

## 2025-08-21 PROCEDURE — 73130 X-RAY EXAM OF HAND: CPT | Mod: 26,LT,, | Performed by: RADIOLOGY

## 2025-08-21 PROCEDURE — 99999 PR PBB SHADOW E&M-EST. PATIENT-LVL III: CPT | Mod: PBBFAC,,, | Performed by: SPECIALIST/TECHNOLOGIST

## 2025-08-21 PROCEDURE — 73130 X-RAY EXAM OF HAND: CPT | Mod: TC,LT

## (undated) DEVICE — DRAPE STERI-DRAPE 1000 17X11IN

## (undated) DEVICE — SUT ETHILON 3-0 FS-1 30

## (undated) DEVICE — APPLICATOR CHLORAPREP ORN 26ML

## (undated) DEVICE — NDL ECLIPSE SAFETY 23G 1.5IN

## (undated) DEVICE — Device

## (undated) DEVICE — CUFF TOURNIQUET DL PRT

## (undated) DEVICE — SPLINT FIBERGLASS PAD 4X15

## (undated) DEVICE — GLOVE BIOGEL ECLIPSE SZ 7

## (undated) DEVICE — SUT VICRYL 4-0 18 P-3

## (undated) DEVICE — PAD CAST SPECIALIST STRL 4

## (undated) DEVICE — FORCEP STRAIGHT DISP

## (undated) DEVICE — BLADE SURG STAINLESS STEEL #15

## (undated) DEVICE — SPLINT PLASTER FAST SET 5X30IN

## (undated) DEVICE — GOWN NONREINF SET-IN SLV XL

## (undated) DEVICE — SUT 4/0 18IN ETHILON BL P3

## (undated) DEVICE — DRESSING N ADH OIL EMUL 3X3

## (undated) DEVICE — NDL HYPO STD REG BVL 18GX1.5IN

## (undated) DEVICE — GLOVE BIOGEL PI MICRO INDIC 7

## (undated) DEVICE — UNDERPAD ULTRASORB 300LB 30X36

## (undated) DEVICE — SOL IRR SOD CHL .9% POUR

## (undated) DEVICE — PACK UPPER EXTREMITY BAPTIST

## (undated) DEVICE — SYR B-D DISP CONTROL 10CC100/C

## (undated) DEVICE — CORD BIPOLAR 12 FOOT

## (undated) DEVICE — BANDAGE BULKEE LITE 3INX4.1YD

## (undated) DEVICE — SLING ARM LARGE FOAM STRAP

## (undated) DEVICE — SOL POVIDONE SCRUB IODINE 4 OZ

## (undated) DEVICE — TAPE SURG DURAPORE 2 X10YD

## (undated) DEVICE — BUCKET PLASTER DISPOSABLE

## (undated) DEVICE — SUT ETHILON 4-0 BLK MONO

## (undated) DEVICE — TOURNIQUET SB QC DP 18X4IN

## (undated) DEVICE — BLADE SURG STAINLESS STEEL #10

## (undated) DEVICE — DRAPE C-ARM MINI DISP

## (undated) DEVICE — IMPLANTABLE DEVICE
Type: IMPLANTABLE DEVICE | Site: HAND | Status: NON-FUNCTIONAL
Removed: 2025-02-05

## (undated) DEVICE — SLING ARM MEDIUM FOAM STRAP

## (undated) DEVICE — SPONGE COTTON TRAY 4X4IN

## (undated) DEVICE — BANDAGE MATRIX HK LOOP 4IN 5YD

## (undated) DEVICE — CAST PLSTR SPLINT X-FAST 3X15

## (undated) DEVICE — BLADE SAW MED NAR 18.0X5.5MM

## (undated) DEVICE — GAUZE CNFRM STRL 3INX4.1YD

## (undated) DEVICE — BANDAGE MATRIX HK LOOP 2IN 5YD

## (undated) DEVICE — SUT PROLENE 4-0 RB-1 BL MO

## (undated) DEVICE — SUT MCRYL PLUS 3-0 PS2 27IN

## (undated) DEVICE — IMMOBILIZER HAND ALUMINUM LRG

## (undated) DEVICE — BLADE SCALP OPHTL BEVEL STR

## (undated) DEVICE — DRESSING XEROFORM NONADH 1X8IN

## (undated) DEVICE — SUT MCRYL PLUS 4-0 PS2 27IN

## (undated) DEVICE — SUT MONOCRYL PLUS 4-0 P3

## (undated) DEVICE — BLADE SCALP OPHTL RND TIP

## (undated) DEVICE — BLADE SAW 16.0MM X 7.0MM